# Patient Record
Sex: FEMALE | Race: WHITE | NOT HISPANIC OR LATINO | Employment: OTHER | URBAN - METROPOLITAN AREA
[De-identification: names, ages, dates, MRNs, and addresses within clinical notes are randomized per-mention and may not be internally consistent; named-entity substitution may affect disease eponyms.]

---

## 2017-02-14 ENCOUNTER — TRANSCRIBE ORDERS (OUTPATIENT)
Dept: ADMINISTRATIVE | Age: 82
End: 2017-02-14

## 2017-02-14 ENCOUNTER — APPOINTMENT (OUTPATIENT)
Dept: LAB | Age: 82
End: 2017-02-14
Payer: MEDICARE

## 2017-02-14 DIAGNOSIS — I10 ESSENTIAL (PRIMARY) HYPERTENSION: ICD-10-CM

## 2017-02-14 DIAGNOSIS — D64.9 ANEMIA: ICD-10-CM

## 2017-02-14 DIAGNOSIS — E11.29 TYPE 2 DIABETES MELLITUS WITH OTHER DIABETIC KIDNEY COMPLICATION (HCC): ICD-10-CM

## 2017-02-14 LAB
ALBUMIN SERPL BCP-MCNC: 3.3 G/DL (ref 3.5–5)
ALP SERPL-CCNC: 62 U/L (ref 46–116)
ALT SERPL W P-5'-P-CCNC: 22 U/L (ref 12–78)
ANION GAP SERPL CALCULATED.3IONS-SCNC: 8 MMOL/L (ref 4–13)
AST SERPL W P-5'-P-CCNC: 18 U/L (ref 5–45)
BASOPHILS # BLD AUTO: 0.03 THOUSANDS/ΜL (ref 0–0.1)
BASOPHILS NFR BLD AUTO: 0 % (ref 0–1)
BILIRUB SERPL-MCNC: 0.28 MG/DL (ref 0.2–1)
BUN SERPL-MCNC: 39 MG/DL (ref 5–25)
CALCIUM SERPL-MCNC: 9.3 MG/DL (ref 8.3–10.1)
CHLORIDE SERPL-SCNC: 107 MMOL/L (ref 100–108)
CO2 SERPL-SCNC: 25 MMOL/L (ref 21–32)
CREAT SERPL-MCNC: 1.26 MG/DL (ref 0.6–1.3)
CREAT UR-MCNC: 47.3 MG/DL
EOSINOPHIL # BLD AUTO: 0.13 THOUSAND/ΜL (ref 0–0.61)
EOSINOPHIL NFR BLD AUTO: 2 % (ref 0–6)
ERYTHROCYTE [DISTWIDTH] IN BLOOD BY AUTOMATED COUNT: 14.9 % (ref 11.6–15.1)
EST. AVERAGE GLUCOSE BLD GHB EST-MCNC: 154 MG/DL
GFR SERPL CREATININE-BSD FRML MDRD: 40.4 ML/MIN/1.73SQ M
GLUCOSE SERPL-MCNC: 135 MG/DL (ref 65–140)
HBA1C MFR BLD: 7 % (ref 4.2–6.3)
HCT VFR BLD AUTO: 36.3 % (ref 34.8–46.1)
HGB BLD-MCNC: 11.6 G/DL (ref 11.5–15.4)
LYMPHOCYTES # BLD AUTO: 1.6 THOUSANDS/ΜL (ref 0.6–4.47)
LYMPHOCYTES NFR BLD AUTO: 18 % (ref 14–44)
MCH RBC QN AUTO: 28.2 PG (ref 26.8–34.3)
MCHC RBC AUTO-ENTMCNC: 32 G/DL (ref 31.4–37.4)
MCV RBC AUTO: 88 FL (ref 82–98)
MICROALBUMIN UR-MCNC: 20.6 MG/L (ref 0–20)
MICROALBUMIN/CREAT 24H UR: 44 MG/G CREATININE (ref 0–30)
MONOCYTES # BLD AUTO: 0.54 THOUSAND/ΜL (ref 0.17–1.22)
MONOCYTES NFR BLD AUTO: 6 % (ref 4–12)
NEUTROPHILS # BLD AUTO: 6.61 THOUSANDS/ΜL (ref 1.85–7.62)
NEUTS SEG NFR BLD AUTO: 74 % (ref 43–75)
NRBC BLD AUTO-RTO: 0 /100 WBCS
PLATELET # BLD AUTO: 288 THOUSANDS/UL (ref 149–390)
PMV BLD AUTO: 11.1 FL (ref 8.9–12.7)
POTASSIUM SERPL-SCNC: 4.6 MMOL/L (ref 3.5–5.3)
PROT SERPL-MCNC: 7.4 G/DL (ref 6.4–8.2)
RBC # BLD AUTO: 4.12 MILLION/UL (ref 3.81–5.12)
SODIUM SERPL-SCNC: 140 MMOL/L (ref 136–145)
WBC # BLD AUTO: 8.94 THOUSAND/UL (ref 4.31–10.16)

## 2017-02-14 PROCEDURE — 85025 COMPLETE CBC W/AUTO DIFF WBC: CPT

## 2017-02-14 PROCEDURE — 83036 HEMOGLOBIN GLYCOSYLATED A1C: CPT

## 2017-02-14 PROCEDURE — 82570 ASSAY OF URINE CREATININE: CPT

## 2017-02-14 PROCEDURE — 82043 UR ALBUMIN QUANTITATIVE: CPT

## 2017-02-14 PROCEDURE — 36415 COLL VENOUS BLD VENIPUNCTURE: CPT

## 2017-02-14 PROCEDURE — 80053 COMPREHEN METABOLIC PANEL: CPT

## 2017-02-21 ENCOUNTER — ALLSCRIPTS OFFICE VISIT (OUTPATIENT)
Dept: OTHER | Facility: OTHER | Age: 82
End: 2017-02-21

## 2017-02-21 DIAGNOSIS — E78.5 HYPERLIPIDEMIA: ICD-10-CM

## 2017-02-21 DIAGNOSIS — R94.6 ABNORMAL RESULTS OF THYROID FUNCTION STUDIES: ICD-10-CM

## 2017-02-21 DIAGNOSIS — E11.29 TYPE 2 DIABETES MELLITUS WITH OTHER DIABETIC KIDNEY COMPLICATION (HCC): ICD-10-CM

## 2017-02-21 DIAGNOSIS — C34.90 MALIGNANT NEOPLASM OF UNSPECIFIED PART OF UNSPECIFIED BRONCHUS OR LUNG (HCC): ICD-10-CM

## 2017-03-06 ENCOUNTER — HOSPITAL ENCOUNTER (OUTPATIENT)
Dept: RADIOLOGY | Age: 82
Discharge: HOME | End: 2017-03-06
Payer: MEDICARE

## 2017-03-06 DIAGNOSIS — C34.90 MALIGNANT NEOPLASM OF UNSPECIFIED PART OF UNSPECIFIED BRONCHUS OR LUNG (HCC): ICD-10-CM

## 2017-03-06 PROCEDURE — 71250 CT THORAX DX C-: CPT

## 2017-03-13 ENCOUNTER — ALLSCRIPTS OFFICE VISIT (OUTPATIENT)
Dept: OTHER | Facility: OTHER | Age: 82
End: 2017-03-13

## 2017-07-07 ENCOUNTER — APPOINTMENT (OUTPATIENT)
Dept: LAB | Facility: CLINIC | Age: 82
End: 2017-07-07
Payer: MEDICARE

## 2017-07-07 DIAGNOSIS — E11.29 TYPE 2 DIABETES MELLITUS WITH OTHER DIABETIC KIDNEY COMPLICATION (HCC): ICD-10-CM

## 2017-07-07 DIAGNOSIS — R94.6 ABNORMAL RESULTS OF THYROID FUNCTION STUDIES: ICD-10-CM

## 2017-07-07 DIAGNOSIS — E78.5 HYPERLIPIDEMIA: ICD-10-CM

## 2017-07-07 LAB
CHOLEST SERPL-MCNC: 144 MG/DL (ref 50–200)
CREAT UR-MCNC: 62.4 MG/DL
EST. AVERAGE GLUCOSE BLD GHB EST-MCNC: 177 MG/DL
HBA1C MFR BLD: 7.8 % (ref 4.2–6.3)
HDLC SERPL-MCNC: 41 MG/DL (ref 40–60)
LDLC SERPL CALC-MCNC: 65 MG/DL (ref 0–100)
MICROALBUMIN UR-MCNC: 12.9 MG/L (ref 0–20)
MICROALBUMIN/CREAT 24H UR: 21 MG/G CREATININE (ref 0–30)
T4 FREE SERPL-MCNC: 1.1 NG/DL (ref 0.76–1.46)
TRIGL SERPL-MCNC: 188 MG/DL
TSH SERPL DL<=0.05 MIU/L-ACNC: 0.27 UIU/ML (ref 0.36–3.74)

## 2017-07-07 PROCEDURE — 83036 HEMOGLOBIN GLYCOSYLATED A1C: CPT

## 2017-07-07 PROCEDURE — 82570 ASSAY OF URINE CREATININE: CPT

## 2017-07-07 PROCEDURE — 82043 UR ALBUMIN QUANTITATIVE: CPT

## 2017-07-07 PROCEDURE — 36415 COLL VENOUS BLD VENIPUNCTURE: CPT

## 2017-07-07 PROCEDURE — 80061 LIPID PANEL: CPT

## 2017-07-07 PROCEDURE — 84439 ASSAY OF FREE THYROXINE: CPT

## 2017-07-07 PROCEDURE — 84443 ASSAY THYROID STIM HORMONE: CPT

## 2017-07-14 ENCOUNTER — ALLSCRIPTS OFFICE VISIT (OUTPATIENT)
Dept: OTHER | Facility: OTHER | Age: 82
End: 2017-07-14

## 2017-07-14 DIAGNOSIS — I10 ESSENTIAL (PRIMARY) HYPERTENSION: ICD-10-CM

## 2017-07-14 DIAGNOSIS — R94.6 ABNORMAL RESULTS OF THYROID FUNCTION STUDIES: ICD-10-CM

## 2017-07-14 DIAGNOSIS — E11.65 TYPE 2 DIABETES MELLITUS WITH HYPERGLYCEMIA (HCC): ICD-10-CM

## 2017-11-07 ENCOUNTER — APPOINTMENT (OUTPATIENT)
Dept: LAB | Facility: CLINIC | Age: 82
End: 2017-11-07
Payer: MEDICARE

## 2017-11-07 DIAGNOSIS — I10 ESSENTIAL (PRIMARY) HYPERTENSION: ICD-10-CM

## 2017-11-07 DIAGNOSIS — R94.6 ABNORMAL RESULTS OF THYROID FUNCTION STUDIES: ICD-10-CM

## 2017-11-07 DIAGNOSIS — E11.65 TYPE 2 DIABETES MELLITUS WITH HYPERGLYCEMIA (HCC): ICD-10-CM

## 2017-11-07 LAB
ALBUMIN SERPL BCP-MCNC: 3.7 G/DL (ref 3.5–5)
ALP SERPL-CCNC: 72 U/L (ref 46–116)
ALT SERPL W P-5'-P-CCNC: 21 U/L (ref 12–78)
ANION GAP SERPL CALCULATED.3IONS-SCNC: 8 MMOL/L (ref 4–13)
AST SERPL W P-5'-P-CCNC: 16 U/L (ref 5–45)
BILIRUB SERPL-MCNC: 0.19 MG/DL (ref 0.2–1)
BUN SERPL-MCNC: 36 MG/DL (ref 5–25)
CALCIUM SERPL-MCNC: 9.5 MG/DL (ref 8.3–10.1)
CHLORIDE SERPL-SCNC: 105 MMOL/L (ref 100–108)
CO2 SERPL-SCNC: 28 MMOL/L (ref 21–32)
CREAT SERPL-MCNC: 1.4 MG/DL (ref 0.6–1.3)
CREAT UR-MCNC: 52.8 MG/DL
EST. AVERAGE GLUCOSE BLD GHB EST-MCNC: 160 MG/DL
GFR SERPL CREATININE-BSD FRML MDRD: 34 ML/MIN/1.73SQ M
GLUCOSE P FAST SERPL-MCNC: 87 MG/DL (ref 65–99)
HBA1C MFR BLD: 7.2 % (ref 4.2–6.3)
MICROALBUMIN UR-MCNC: 53.1 MG/L (ref 0–20)
MICROALBUMIN/CREAT 24H UR: 101 MG/G CREATININE (ref 0–30)
POTASSIUM SERPL-SCNC: 4.7 MMOL/L (ref 3.5–5.3)
PROT SERPL-MCNC: 8.1 G/DL (ref 6.4–8.2)
SODIUM SERPL-SCNC: 141 MMOL/L (ref 136–145)
TSH SERPL DL<=0.05 MIU/L-ACNC: 0.38 UIU/ML (ref 0.36–3.74)

## 2017-11-07 PROCEDURE — 83036 HEMOGLOBIN GLYCOSYLATED A1C: CPT

## 2017-11-07 PROCEDURE — 82043 UR ALBUMIN QUANTITATIVE: CPT

## 2017-11-07 PROCEDURE — 82570 ASSAY OF URINE CREATININE: CPT

## 2017-11-07 PROCEDURE — 36415 COLL VENOUS BLD VENIPUNCTURE: CPT

## 2017-11-07 PROCEDURE — 80053 COMPREHEN METABOLIC PANEL: CPT

## 2017-11-07 PROCEDURE — 84443 ASSAY THYROID STIM HORMONE: CPT

## 2017-11-13 ENCOUNTER — ALLSCRIPTS OFFICE VISIT (OUTPATIENT)
Dept: OTHER | Facility: OTHER | Age: 82
End: 2017-11-13

## 2017-11-13 DIAGNOSIS — E11.65 TYPE 2 DIABETES MELLITUS WITH HYPERGLYCEMIA (HCC): ICD-10-CM

## 2017-11-13 DIAGNOSIS — I10 ESSENTIAL (PRIMARY) HYPERTENSION: ICD-10-CM

## 2017-11-13 DIAGNOSIS — E11.29 TYPE 2 DIABETES MELLITUS WITH OTHER DIABETIC KIDNEY COMPLICATION (CODE): ICD-10-CM

## 2017-11-13 DIAGNOSIS — E78.5 HYPERLIPIDEMIA: ICD-10-CM

## 2017-11-14 NOTE — PROGRESS NOTES
Assessment    1  Diabetes mellitus with microalbuminuria (250 40,791 0) (E11 29,R80 9)   2  Diabetes mellitus type II, uncontrolled (250 02) (E11 65)   3  Hypertension (401 9) (I10)   4  Chronic GERD (530 81) (K21 9)   5  Abnormal thyroid function test (794 5) (R94 6)    Plan  Chronic GERD    · Follow-up visit in 4 Months Evaluation and Treatment  Follow-up  Status: Hold For - Scheduling Requested for: 62HVH0721   · Raise the head of your bed to keep stomach acid from coming back up ; Status:Complete;   Done:13Nov2017   · Several things can be done to help treat and prevent your gastric reflux ; Status:Complete;   Done:13Nov2017  Diabetes mellitus type II, uncontrolled, Diabetes mellitus with microalbuminuria    · Basaglar KwikPen 100 UNIT/ML Subcutaneous Solution Pen-injector; inject 34units sc atbedtime   · (1) HEMOGLOBIN A1C; Status:Active; Requested for:13Nov2017;    · (1) MICROALBUMIN CREATININE RATIO, RANDOM URINE; Status:Active; Requested for:13Nov2017;   Hyperlipidemia    · (1) LIPID PANEL FASTING W DIRECT LDL REFLEX; Status:Active; Requested for:13Nov2017;   Hypertension    · (1) COMPREHENSIVE METABOLIC PANEL; Status:Active; Requested for:13Nov2017;   Type 2 diabetes mellitus    · Lantus SoloStar 100 UNIT/ML Subcutaneous Solution Pen-injector    Discussion/Summary  Discussion Summary:   DM2 with microalbuminuria - A1c is improved to 7 2 on lantus 40units daily but has had two episodes of hypoglycemia  She will need to be switched to another basal insulin due to change in formulary  Will rx Basaglar, decrease down to 34units qHS  She is on ACEI, microalbuminuria is stable  essential HTN -stable on lisinopril 10mg daily, amlodipine 5mg daily and HCTZ 12 5mg daily  Monitor BMPChronic GERD -she has been on omeprazole long-term  Will begin taper off, advised start taking every other day for 2 weeks then discontinue  She can take Tums or H2B as needed for breakthrough symptoms  abnormal TSH - TSH has normalized  Will monitor  LAURA - advised to stop taking NSAIDs  Monitor bmpHLD - continue on pravastatin 20mg daily  RLL lung cancer s/p lobectomy- on surveillance by Thoracich/o lymphoma - monitor follow CBC and CMP  She has been released by Onch/o superficial transitional cell bladder cancer - on surveillance by Urology  Medication SE Review and Pt Understands Tx: Possible side effects of new medications were reviewed with the patient/guardian today  The treatment plan was reviewed with the patient/guardian  The patient/guardian understands and agrees with the treatment plan      Chief Complaint  Chief Complaint Chronic Condition [facility]: Patient is here today for follow up of chronic conditions described in HPI  History of Present Illness  HPI: 81yo female with HTN, HLD, RLL lung cancer s/p lobectomy, DM2, remote history of lymphoma here for follow up care  once in a while she takes aleve, for example, [place] and [place] two aleve because of knee pain  She does not take them everyday  Primary Care Diagnostic [facility]: The patient is here today for a follow-up visit  Her diabetes mellitus type 2 is improving  the patient is adherent with her medication regimen  -- She denies medication side effects  (Reports [employer] is no longer covering Lantus under her formulary, requests switch to another insulin  Reports FBS 78 and 98 and hypoglycemic symptoms  Otherwise -170s and -180s)  Her hypertension is primary, but-- stable  the patient is adherent with her medication regimen  -- She denies medication side effects  (She does not monitor her home bp)  She was diagnosed with GERD  The problem stable  the patient is adherent with her medication regimen  -- She denies medication side effects  Medication(s): omeprazole  (She has been on omeprazole several years) (She has been asymptomatic with taking omeprazole daily)     Symptoms: denies chest pain,-- denies dyspnea-- and-- stable lower extremity edema  Lifestyle and Disease Management: Weight Issues: She has weight concerns  Exercise: She does not exercise regularly  Review of Systems  Complete-Female:  Constitutional: weight is stable  Eyes: eyesight problems  Cardiovascular: No complaints of slow heart rate, no fast heart rate, no chest pain, no palpitations, no leg claudication, no lower extremity edema  Respiratory: No complaints of shortness of breath, no wheezing, no cough, no SOB on exertion, no orthopnea, no PND  Gastrointestinal: heartburn  Genitourinary: nocturia, but-- no dysuria-- and-- no incontinence  Musculoskeletal: joint stiffness,-- pain in the upper arm and foot problems  Neurological: numbness,-- tingling-- and-- difficulty walking, but-- no headache-- and-- no dizziness  Psychiatric: anxiety-- and-- sleep disturbances, but-- no depression  Active Problems  1  Abnormal thyroid function test (794 5) (R94 6)   2  Adenocarcinoma of lung, right (162 9) (C34 91)   3  Adenocarcinoma of lung, unspecified laterality (162 9) (C34 90)   4  Anemia (285 9) (D64 9)   5  Diabetes mellitus type II, uncontrolled (250 02) (E11 65)   6  Diabetes mellitus with microalbuminuria (250 40,791 0) (E11 29,R80 9)   7  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)   8  Encounter for screening mammogram for malignant neoplasm of breast (V76 12) (Z12 31)   9  Glaucoma (365 9) (H40 9)   10  Hyperlipidemia (272 4) (E78 5)   11  Hypertension (401 9) (I10)   12  Need for prophylactic vaccination and inoculation against influenza (V04 81) (Z23)   13  Screening for genitourinary condition (V81 6) (Z13 89)   14  Screening for neurological condition (V80 09) (Z13 89)   15  Squamous cell lung cancer (162 9) (C34 90)    Past Medical History    1  History of Acute bronchitis, unspecified organism (466 0) (J20 9)   2  History of Bladder Cancer (V10 51)   3   History of Closed fracture of neck of left humerus, with routine healing, subsequent encounter (V54 11) (S42 212D)   4  History of Diabetes Mellitus (250 00)   5  History of gastroesophageal reflux (GERD) (V12 79) (Z87 19)   6  History of hyperlipidemia (V12 29) (Z86 39)   7  History of hypertension (V12 59) (Z86 79)   8  History of non-Hodgkin's lymphoma (V10 79) (Z85 72)   9  History of Large Cell Lymphoma (200 70)   10  History of Liver Cancer (V10 07)   11  History of Lung Cancer (V10 11)   12  History of Lymphoma (V10 71)   13  History of Osteoporosis (733 00) (M81 0)   14  History of Other closed displaced fracture of proximal end of left humerus, initial encounter (812 09)  (S42 292A)   15  History of Right cataract (366 9) (H26 9)   16  Status post fall (V15 88) (Z91 81)   17  History of Visit for pre-operative examination (V72 84) (T05 118)  Active Problems And Past Medical History Reviewed: The active problems and past medical history were reviewed and updated today  Surgical History  1  History of Cataract Surgery   2  History of Hepatic Lobectomy   3  History of Lung Lobectomy  Surgical History Reviewed: The surgical history was reviewed and updated today  Family History  Mother    1  Family history of Adenocarcinoma Of The Accessory Sinuses (V16 2)   2  Family history of Benign Brain Tumor  Father    3  Family history of Diabetes Mellitus (V18 0)   4  Family history of Leukemia (V16 6)  Sister    5  Family history of Alcoholic (Laennec's) Cirrhosis  Family History    6  Family history of Family Health Status Of Father -    9  Family history of Family Health Status Of Mother -   Family History Reviewed: The family history was reviewed and updated today  Social History     · Denied: History of Drug Use   · Former smoker (V15 82) (Y34 032)   · Marital History -    · No alcohol use   · Occupation: Retired  Social History Reviewed: The social history was reviewed and is unchanged  Current Meds   1   AmLODIPine Besylate 5 MG Oral Tablet; take one tablet once daily; Therapy: 91PHJ8002 to (TGCOTKPM:96AEW8538)  Requested for: 57Eac8031; Last Rx:28Cqr6703 Ordered   2  BD Pen Needle Ultrafine 29G X 12 7MM Miscellaneous; testing twice daily; Therapy: 90JJV1560 to (Evaluate:02Oct2016); Last Rx:19Aln6335 Ordered   3  Biotin 5000 MCG Oral Capsule; Therapy: 15JRI5212 to Recorded   4  Calcium Citrate + D3 250-200 MG-UNIT Oral Tablet; Therapy: 12Apr2013 to Recorded   5  HydroCHLOROthiazide 12 5 MG Oral Tablet; TAKE 1 TABLET DAILY AS NEEDED; Therapy: 30Nqu1357 to (Evaluate:25Nov2017)  Requested for: 93DXC1057; Last Rx:30Nov2016 Ordered   6  Lantus SoloStar 100 UNIT/ML Subcutaneous Solution Pen-injector; inject 40 unit daily; Therapy: 93Tnx5677 to (Evaluate:04Oct2017)  Requested for: 07Apr2017; Last Rx:07Apr2017 Ordered   7  Lisinopril 10 MG Oral Tablet; TAKE 1 TABLET DAILY; Therapy: 08Apr2016 to (DMDGKKTT:95MPL4480)  Requested for: 58Ozi0419; Last Rx:01San4002 Ordered   8  Multivitamins TABS; TAKE 1 TABLET DAILY; Therapy: 12Apr2013 to (Evaluate:12May2013) Recorded   9  Pravastatin Sodium 20 MG Oral Tablet; TAKE 1 TABLET DAILY; Therapy: 12Apr2013 to (Tara Acuna)  Requested for: 60DBA1870; Last Rx:10Oct2017 Ordered   10  PriLOSEC OTC 20 MG Oral Tablet Delayed Release; take 1 tablet every other day; Therapy: 12Apr2013 to (Evaluate:28Mar2016) Recorded   11  Vitamin B-12 100 MCG Oral Tablet; Therapy: 12Apr2013 to Recorded  Medication List Reviewed: The medication list was reviewed and updated today  Allergies  1  No Known Drug Allergies  2  No Known Environmental Allergies   3  No Known Food Allergies    Vitals  Vital Signs    Recorded: 94IVH0974 10:30AM   Temperature 98 3 F   Heart Rate 90   Systolic 798   Diastolic 72   Height 5 ft 4 in   Weight 172 lb    BMI Calculated 29 52   BSA Calculated 1 83   O2 Saturation 98       Physical Exam   Constitutional  General appearance: No acute distress, well appearing and well nourished  obese    Ears, Nose, Mouth, and Throat  Oropharynx: Normal with no erythema, edema, exudate or lesions  -- wears dentures, MMM  Pulmonary  Respiratory effort: No increased work of breathing or signs of respiratory distress  Auscultation of lungs: Clear to auscultation  Cardiovascular  Auscultation of heart: Normal rate and rhythm, normal S1 and S2, without murmurs  Examination of extremities for edema and/or varicosities: Abnormal   left ankle trace+ pitting edema  varicosities noted bilaterally  Abdomen  Abdomen: Non-tender, no masses  The abdomen was obese  Bowel sounds were normal  The abdomen was soft and nontender  The abdomen was normal to percussion  Lymphatic  Palpation of lymph nodes in neck: No lymphadenopathy  Neurologic Grossly intact  Psychiatric  Orientation to person, place, and time: Normal    Mood and affect: Normal          Results/Data   07 Nov 2017 11:36 AM   (1) TSH WITH FT4 REFLEX       TSH 0 379  07 Nov 2017 11:36 AM   (1) HEMOGLOBIN A1C       HEMOGLOBIN A1C 7 2  07 Nov 2017 11:36 AM   (1) MICROALBUMIN CREATININE RATIO, RANDOM URINE       MICROALBUMIN/ CREAT R 101       MICROALBUMIN,URINE 53 1       CREATININE URINE 52 8       EST  AVG  GLUCOSE 160  07 Nov 2017 11:36 AM   (1) COMPREHENSIVE METABOLIC PANEL       SODIUM 141       POTASSIUM 4 7       CHLORIDE 105       CARBON DIOXIDE 28       ANION GAP (CALC) 8       BLOOD UREA NITROGEN 36       CREATININE 1 40       CALCIUM 9 5       BILI, TOTAL 0 19       ALK PHOSPHATAS 72       ALT (SGPT) 21       AST(SGOT) 16       ALBUMIN 3 7       TOTAL PROTEIN 8 1       eGFR 34       GLUCOSE FASTING 87  Future Appointments    Date/Time Provider Specialty Site   03/12/2018 10:00 AM MOISÉS Eaton   Thoracic Surgery [facility]       Signatures   Electronically signed by : Jay Croft DO; Nov 13 2017 11:13AM EST                       (Author)

## 2018-01-11 NOTE — MISCELLANEOUS
Assessment    1  Closed fracture of neck of left humerus, with routine healing, subsequent encounter   (V54 11) (S42 212D)   2  Hypertension (401 9) (I10)   3  Anemia (285 9) (D64 9)   4  Status post fall (V15 88) (Z91 81)    Plan  Anemia    · (1) CBC/ PLT (NO DIFF); Status:Active; Requested for:44Xyr5928;    Perform:[facility]; HDB:09KMP4347;OXKHURY; [facility]; Ordered By:Nurys Brambila;   · (1) CBC/ PLT (NO DIFF); Status:Active; Requested for:2016;    Perform:[facility]; PNR:51ZOQ9788;XEDEJHY; [facility]; Ordered By:Nurys Brambila;   · (1) FERRITIN; Status:Active; Requested for:66Ldw0867;    Perform:[facility]; LY14ZVZ1554;NMVBSNW; [facility]; Ordered By:Nurys Brambila;   · (1) IRON; Status:Active; Requested for:45Syd5860;    Perform:[facility]; FHF:48LXM8796;PFRFPEM; [facility]; Ordered By:Nurys Brambila;   · (1) TIBC; Status:Active; Requested for:29Qdn3094;    Perform:[facility]; PKV:62XZN4175;EWMRZQV; [facility]; Ordered By:Nurys Brambila;  Diabetes mellitus with microalbuminuria    · (1) HEMOGLOBIN A1C; Status:Active; Requested for:2016;    Perform:[facility]; LGJ:83GRK6005;UCNZFYL; For:Diabetes mellitus with microalbuminuria; Ordered By:Nurys Brambila;   · (1) MICROALBUMIN CREATININE RATIO, RANDOM URINE; Status:Active; Requested  for:2016;    Perform:[facility]; DQE:26QXF7769;ZSHBEKZ; For:Diabetes mellitus with microalbuminuria; Ordered By:Nurys Brambila; Hyperlipidemia    · (1) LIPID PANEL FASTING W DIRECT LDL REFLEX; Status:Active; Requested  for:00Xer4070;    Perform:[facility]; AOX:20RVT3147;OJWJUXH;  Jenni Lucas; Ordered By:Nurys Brambila; Hypertension    · Hydrochlorothiazide 12 5 MG Oral Tablet; TAKE 1 TABLET DAILY AS NEEDED   Rx By: Michael Sexton; Dispense: 30 Days ; #:30 Tablet; Refill: 0; For: Hypertension; VICTOR M = N; Verified Transmission to [facility]/PHARMACY #[id number]  Last Updated By: System, Tavia; 8/11/2016 2:17:44 PM   · (1) BASIC METABOLIC PROFILE; Status:Active; Requested for:11Aug2016;    Perform:[facility]; RJK:55BQN1733;MKIMQDQ;  For:Hypertension; Ordered By:Nurys Brambila;   · (1) COMPREHENSIVE METABOLIC PANEL; Status:Active; Requested for:11Ylz2933;    Perform:[facility]; HQU:14YWE0869;EPMQSSA;  For:Hypertension; Ordered By:Belgica Brambila;   · Follow-up visit in 3 months Evaluation and Treatment  Follow-up  Status: Hold For -  Scheduling  Requested for: 11Aug2016   Ordered; For: Hypertension; Ordered By: Jae Sahni Performed:  Due: 99XTT1608    Discussion/Summary  Discussion Summary:     1  left humerus fracture s/p mechanical fall - patient will remain NWB and in LUE sling until evaluated by Ortho  Pain is controlled  2  essential HTN - due to BLE edema, will restart on HCTZ 12 5mg daily, decrease dose of amlodipine 5mg bid to once daily  Obtain BMP in 3-5 days  Call with bp log in 1 week  Will keep off lisinopril 10mg daily for now  3  anemia - check iron panel, repeat CBC  4  DM2 with microalbuminuria - A1c improved to 7 4  WIll continue on lantus 35units in AM  ACEI was discontinued during her hospitalization  History of Present Illness  TCM Communication [facility]: The patient is being contacted for follow-up after hospitalization and [facility]  She was hospitalized at and [facility]  The date of admission: 7/22/2016, date of discharge: 8/3/2016  She was discharged to home  She scheduled a follow up appointment  The patient is currently asymptomatic  Counseling was provided to the patient  Communication performed and completed by Gwen Ennis   HPI: 79yo female with h/o HTN, HLD, RLL lung cancer s/p lobectomy, DM2, remote history of lymphoma here for transition of care  She was hospitalized at [facility] 7/17-7/22/16 following a mechanical fall  She suffered a closed 2-part nondisplaced fracture of surgical neck of left humerus   No surgical intervention was indicated, her left arm was placed in a sling  She was transferred to [facility] for rehab  She was discharged on 8/4/16  She remains nonweightbearing on her LUE  She has an upcoming appointment with Ortho 8/19/16  She has minimal pain, if she turns a certain way it will  She has not needed to take anything for pain  She is right handed  She has nurses' aides in AM and PM to help with dressing  She has not started PT yet as she is still in a sling  Her bp meds were adjusted, she was discontinued off triamterene/hctz and lisinopril 10mg daily which was restarted at her last visit  She has noticed BLE edema since she was in rehab  She is noted to have anemia, hb of 9  Denies melena, hematemesis or abdominal pain  She is not SOB, no dizziness  Review of Systems  Complete-Female:   Constitutional: recent weight loss  Cardiovascular: No complaints of slow heart rate, no fast heart rate, no chest pain, no palpitations, no leg claudication, no lower extremity edema  Respiratory: No complaints of shortness of breath, no wheezing, no cough, no SOB on exertion, no orthopnea, no PND  Gastrointestinal: No complaints of abdominal pain, no constipation, no nausea or vomiting, no diarrhea, no bloody stools  Musculoskeletal: as noted in HPI  Neurological: difficulty walking, but no headache, no numbness and no dizziness  Active Problems    1  Abnormal thyroid function test (794 5) (R94 6)   2  Adenocarcinoma of lung, unspecified laterality (162 9) (C34 90)   3  Diabetes mellitus with HbA1C goal between 7 and 8 (250 00) (E11 9)   4  Diabetes mellitus with microalbuminuria (250 40,791 0) (E11 29,R80 9)   5  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)   6  Encounter for screening mammogram for malignant neoplasm of breast (V76 12)   (Z12 31)   7  Glaucoma (365 9) (H40 9)   8  Glaucoma screening (V80 1) (Z13 5)   9  Hyperkalemia (276 7) (E87 5)   10   Hyperlipidemia (272 4) (E78 5) 11  Hypertension (401 9) (I10)   12  Immunization, pneumococcus and influenza (V06 6) (Z23)   13  Need for prophylactic vaccination and inoculation against influenza (V04 81) (Z23)   14  Other closed displaced fracture of proximal end of left humerus, initial encounter    (812 09) (S42 292A)   15  Right cataract (366 9) (H26 9)   16  Screening for depression (V79 0) (Z13 89)   17  Screening for genitourinary condition (V81 6) (Z13 89)   18  Screening for neurological condition (V80 09) (Z13 89)   19  Squamous cell lung cancer (162 9) (C34 90)   20  Type 2 diabetes mellitus (250 00) (E11 9)    Past Medical History    1  History of Bladder Cancer (V10 51)   2  History of Diabetes Mellitus (250 00)   3  History of gastroesophageal reflux (GERD) (V12 79) (Z87 19)   4  History of hyperlipidemia (V12 29) (Z86 39)   5  History of hypertension (V12 59) (Z86 79)   6  History of non-Hodgkin's lymphoma (V10 79) (Z85 72)   7  History of Large Cell Lymphoma (200 70)   8  History of Liver Cancer (V10 07)   9  History of Lung Cancer (V10 11)   10  History of Lymphoma (V10 71)   11  History of Osteoporosis (733 00) (M81 0)   12  History of Visit for pre-operative examination (V72 84) (I37 564)    Surgical History    1  History of Cataract Surgery   2  History of Hepatic Lobectomy   3  History of Lung Lobectomy  Surgical History Reviewed: The surgical history was reviewed and updated today  Family History  Mother    1  Family history of Adenocarcinoma Of The Accessory Sinuses (V16 2)   2  Family history of Benign Brain Tumor  Father    3  Family history of Diabetes Mellitus (V18 0)   4  Family history of Leukemia (V16 6)  Sister    5  Family history of Alcoholic (Laennec's) Cirrhosis  Family History    6  Family history of Family Health Status Of Father -    9  Family history of Family Health Status Of Mother -   Family History Reviewed: The family history was reviewed and updated today         Social History    · Alcohol Use (History)   · Denied: History of Drug Use   · Former smoker (E25 77) (T61 066)   · Marital History -    · Occupation: Retired  Social History Reviewed: The social history was reviewed and is unchanged  Current Meds   1  AmLODIPine Besylate 5 MG Oral Tablet; TAKE 1 TABLET TWICE DAILY; Therapy: 57QQG1980 to (Yina Whaley)  Requested for: 12Oct2015; Last   Rx:46Hhx0477 Ordered   2  BD Pen Needle Ultrafine 29G X 12 7MM Miscellaneous; testing twice daily; Therapy: 37UME6911 to (Evaluate:05Orf5259); Last Rx:27Bln3274 Ordered   3  Calcium Citrate + D3 250-200 MG-UNIT Oral Tablet; Therapy: 86Wiy8879 to Recorded   4  Lantus SoloStar 100 UNIT/ML Subcutaneous Solution Pen-injector; Use 35 units in the   morning; Therapy: 61Icc2763 to (Evaluate:37Klg7439)  Requested for: 58Vfu2629 Recorded   5  Multivitamins TABS; TAKE 1 TABLET DAILY; Therapy: 78Plc7830 to (Evaluate:88Lvv7211) Recorded   6  Pravastatin Sodium 20 MG Oral Tablet; TAKE 1 TABLET DAILY; Therapy: 05Ofh3953 to (Evaluate:90Hhb0844)  Requested for: 84Idm1142; Last   Rx:62Owd7300 Ordered   7  PriLOSEC OTC 20 MG Oral Tablet Delayed Release; take 1 tablet every other day; Therapy: 09Qpe0770 to (Evaluate:28Mar2016) Recorded   8  Vitamin B-12 100 MCG Oral Tablet; Therapy: 57Tno9809 to Recorded  Medication List Reviewed: The medication list was reviewed and updated today  Allergies    1  No Known Drug Allergies    2  No Known Environmental Allergies   3  No Known Food Allergies    Vitals  Signs   Recorded: 26KKN3745 27:44FB   Systolic: 165  Diastolic: 66  Heart Rate: 101  Respiration: 17  Temperature: 99 2 F  O2 Saturation: 95  Height: 5 ft 4 in  Weight: 165 lb 2 08 oz  BMI Calculated: 28 34  BSA Calculated: 1 81    Physical Exam    Constitutional   General appearance: No acute distress, well appearing and well nourished  obese     Ears, Nose, Mouth, and Throat   Oropharynx: Normal with no erythema, edema, exudate or lesions  wears dentures, MMM  Pulmonary   Respiratory effort: No increased work of breathing or signs of respiratory distress  Auscultation of lungs: Clear to auscultation  Cardiovascular   Auscultation of heart: Normal rate and rhythm, normal S1 and S2, without murmurs  Examination of extremities for edema and/or varicosities: Abnormal   bilateral pretibial 1+ pitting edema  radial and post tib 2/4 bilaterally  Lymphatic   Palpation of lymph nodes in neck: No lymphadenopathy  Musculoskeletal   Inspection/palpation of joints, bones, and muscles: Abnormal   LUE in arm sling   strength equal    Neurologic Grossly intact  Psychiatric   Orientation to person, place, and time: Normal     Mood and affect: Normal          Results/Data    7/24/16  BUN/creatinine 38/1 09  h/h 10 1/31 0-->9 2/28 0-->9 0  WBC 17-->11 4  A1c 7 4        Future Appointments    Date/Time Provider Specialty Site   03/14/2017 08:30 AM Chad Ernst MD Thoracic Surgery CT SURGERY [place] OFFICE   08/19/2016 11:00 AM MOISÉS Mcgrath   Orthopedic Surgery [facility]     Signatures   Electronically signed by : Jannie Yousif OM; Aug  3 2016  2:02PM EST                       (Author)    Electronically signed by : Izabela Galeas DO; Aug 11 2016  2:33PM EST                       (Author)

## 2018-01-12 NOTE — PROGRESS NOTES
Assessment    1  Diabetes mellitus with microalbuminuria (250 40,791 0) (E11 29,R80 9)   2  Hypertension (401 9) (I10)    Plan  Abnormal thyroid function test    · (1) TSH WITH FT4 REFLEX; Status:Active; Requested for:21Feb2017;   Diabetes mellitus with microalbuminuria    · (1) HEMOGLOBIN A1C; Status:Active; Requested for:21Feb2017;    · (1) MICROALBUMIN CREATININE RATIO, RANDOM URINE; Status:Active; Requested for:21Feb2017;   Hyperlipidemia    · (1) LIPID PANEL FASTING W DIRECT LDL REFLEX; Status:Active; Requested for:21Feb2017;   Hypertension    · Follow-up visit in 3 months Evaluation and Treatment  Follow-up  Status: Hold For - Scheduling   Requested for: 21Feb2017    Discussion/Summary  Discussion Summary:     1  DM2 with microalbuminuria - A1c improved at 7 0 on increased dose of lantus 40units daily  She is continue to monitor for hypoglycemia  Lisinopril restarted with decreased microalbuminuria  2  essential HTN - bp improved with restarting lisinopril 10mg daily  Continue amlodipine 5mg daily and HCTZ 12 5mg daily  3  leukocytosis - resolved on repeat CBC  Chief Complaint  Chief Complaint Chronic Condition [facility]: Patient is here today for follow up of chronic conditions described in HPI  History of Present Illness  HPI: 81yo female with h/o HTN, HLD, RLL lung cancer s/p lobectomy, DM2, remote history of lymphoma here for follow up of diabetes and high blood pressure  She went to the [facility] for 10days and did not follow a diet while on vacation       Primary Care Diagnostic [facility]: The patient is here today for a follow-up visit  Her diabetes mellitus type 2 is improving  Management changes made at the last visit include changing the dose of increased lantus to 40units  the patient is adherent with her medication regimen  She denies medication side effects  (-170s, -200s, denies hypoglycemia)  Her hypertension is primary, but stable and improving   the patient is adherent with her medication regimen  She denies medication side effects  Medication(s): a thiazide diuretic, an ACE inhibitor and a calcium channel blocker  (She does not monitor her home bp)  Symptoms: denies chest pain, denies dyspnea and stable lower extremity edema  Associated symptoms include recent weight gain  Lifestyle and Disease Management: Weight Issues: She has weight concerns  Exercise: She does not exercise regularly  Review of Systems  Complete-Female:   Constitutional: recent weight gain and feeling tired  Eyes: eyesight problems  Cardiovascular: lower extremity edema, but no chest pain and no palpitations  Respiratory: No complaints of shortness of breath, no wheezing, no cough, no SOB on exertion, no orthopnea, no PND  Gastrointestinal: heartburn  Genitourinary: nocturia, but no dysuria and no incontinence  Musculoskeletal: joint stiffness, pain in the upper arm and foot problems  Neurological: numbness, tingling and difficulty walking, but no headache and no dizziness  Psychiatric: anxiety and sleep disturbances, but no depression  Active Problems    1  Abnormal thyroid function test (794 5) (R94 6)   2  Adenocarcinoma of lung, unspecified laterality (162 9) (C34 90)   3  Anemia (285 9) (D64 9)   4  Closed fracture of neck of left humerus, with routine healing, subsequent encounter (V54 11)   (S42 212D)   5  Diabetes mellitus with HbA1C goal between 7 and 8 (250 00) (E11 9)   6  Diabetes mellitus with microalbuminuria (250 40,791 0) (E11 29,R80 9)   7  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)   8  Encounter for screening mammogram for malignant neoplasm of breast (V76 12) (Z12 31)   9  Glaucoma (365 9) (H40 9)   10  Glaucoma screening (V80 1) (Z13 5)   11  Hyperkalemia (276 7) (E87 5)   12  Hyperlipidemia (272 4) (E78 5)   13  Hypertension (401 9) (I10)   14  Immunization, pneumococcus and influenza (V06 6) (Z23)   15   Need for prophylactic vaccination and inoculation against influenza (V04 81) (Z23)   16  Other closed displaced fracture of proximal end of left humerus, initial encounter (812 09)    (S42 292A)   17  Right cataract (366 9) (H26 9)   18  Screening for depression (V79 0) (Z13 89)   19  Screening for genitourinary condition (V81 6) (Z13 89)   20  Screening for neurological condition (V80 09) (Z13 89)   21  Squamous cell lung cancer (162 9) (C34 90)   22  Status post fall (V15 88) (Z91 81)   23  Type 2 diabetes mellitus (250 00) (E11 9)    Past Medical History    1  History of Acute bronchitis, unspecified organism (466 0) (J20 9)   2  History of Bladder Cancer (V10 51)   3  History of Diabetes Mellitus (250 00)   4  History of gastroesophageal reflux (GERD) (V12 79) (Z87 19)   5  History of hyperlipidemia (V12 29) (Z86 39)   6  History of hypertension (V12 59) (Z86 79)   7  History of non-Hodgkin's lymphoma (V10 79) (Z85 72)   8  History of Large Cell Lymphoma (200 70)   9  History of Liver Cancer (V10 07)   10  History of Lung Cancer (V10 11)   11  History of Lymphoma (V10 71)   12  History of Osteoporosis (733 00) (M81 0)   13  History of Visit for pre-operative examination (V72 84) (N13 087)  Active Problems And Past Medical History Reviewed: The active problems and past medical history were reviewed and updated today  Surgical History    1  History of Cataract Surgery   2  History of Hepatic Lobectomy   3  History of Lung Lobectomy  Surgical History Reviewed: The surgical history was reviewed and updated today  Family History  Mother    1  Family history of Adenocarcinoma Of The Accessory Sinuses (V16 2)   2  Family history of Benign Brain Tumor  Father    3  Family history of Diabetes Mellitus (V18 0)   4  Family history of Leukemia (V16 6)  Sister    5  Family history of Alcoholic (Laennec's) Cirrhosis  Family History    6  Family history of Family Health Status Of Father -    9   Family history of Family Health Status Of Mother -   Family History Reviewed: The family history was reviewed and updated today  Social History    · Alcohol Use (History)   · Denied: History of Drug Use   · Former smoker (V15 82) (Y64 889)   · Marital History -    · Occupation: Retired  Social History Reviewed: The social history was reviewed and is unchanged  Current Meds   1  AmLODIPine Besylate 5 MG Oral Tablet; take one tablet once daily; Therapy: 23UNQ8044 to (Evaluate:81Sfy1356)  Requested for: 91OQL7105; Last Rx:2017   Ordered   2  BD Pen Needle Ultrafine 29G X 12 7MM Miscellaneous; testing twice daily; Therapy: 91WEW6854 to (Evaluate:2016); Last Rx:03Bqk9180 Ordered   3  Calcium Citrate + D3 250-200 MG-UNIT Oral Tablet; Therapy: 2013 to Recorded   4  HydroCHLOROthiazide 12 5 MG Oral Tablet; TAKE 1 TABLET DAILY AS NEEDED; Therapy: 2016 to (Evaluate:2017)  Requested for: 23HSR6713; Last Rx:2016   Ordered   5  Lantus SoloStar 100 UNIT/ML Subcutaneous Solution Pen-injector; Use 40 units in the morning; Therapy: 53Ftt7399 to (Evaluate:2017)  Requested for: 18Zoa7540 Recorded   6  Lisinopril 10 MG Oral Tablet; TAKE 1 TABLET DAILY; Therapy: 2016 to (Evaluate:45Yqt5605)  Requested for: 36QXH1529; Last Rx:2017   Ordered   7  Multivitamins TABS; TAKE 1 TABLET DAILY; Therapy: 2013 to (Evaluate:77Uba8063) Recorded   8  Pravastatin Sodium 20 MG Oral Tablet; TAKE 1 TABLET DAILY; Therapy: 2013 to (Evaluate:2017)  Requested for: 58PEQ9976; Last Rx:98Oja9750   Ordered   9  PriLOSEC OTC 20 MG Oral Tablet Delayed Release; take 1 tablet every other day; Therapy: 2013 to (Evaluate:2016) Recorded   10  Vitamin B-12 100 MCG Oral Tablet; Therapy: 2013 to Recorded  Medication List Reviewed: The medication list was reviewed and updated today  Allergies    1  No Known Drug Allergies    2  No Known Environmental Allergies   3   No Known Food Allergies    Vitals  Vital Signs    Recorded: 43Ing7243 10:52AM   Temperature 98 F   Heart Rate 98   Respiration 16   Systolic 465   Diastolic 80   Height 5 ft 4 2 in   Weight 170 lb 2 08 oz   BMI Calculated 29 02   BSA Calculated 1 84   O2 Saturation 95     Physical Exam    Constitutional   General appearance: No acute distress, well appearing and well nourished  obese  Ears, Nose, Mouth, and Throat   Oropharynx: Normal with no erythema, edema, exudate or lesions  wears dentures, MMM  Pulmonary   Respiratory effort: No increased work of breathing or signs of respiratory distress  Auscultation of lungs: Clear to auscultation  Cardiovascular   Auscultation of heart: Normal rate and rhythm, normal S1 and S2, without murmurs  Examination of extremities for edema and/or varicosities: Abnormal   bilateral ankle trace+ pitting edema  wears compression hose  Neurologic No focal deficit  Psychiatric   Orientation to person, place, and time: Normal     Mood and affect: Normal          Results/Data  (1) HEMOGLOBIN A1C 33WCQ8942 08:54AM [facility] Number: [id number]     Test Name Result Flag Reference   HEMOGLOBIN A1C 7 0 % H 4 2-6 3   EST  AVG  GLUCOSE 154 mg/dl       (1) MICROALBUMIN CREATININE RATIO, RANDOM URINE 88BCW5950 08:54AM [employer]    Order Number: [id number]     Test Name Result Flag Reference   MICROALBUMIN/ CREAT R 44 mg/g creatinine H 0-30   MICROALBUMIN,URINE 20 6 mg/L H 0 0-20 0   CREATININE URINE 47 3 mg/dL       (1) COMPREHENSIVE METABOLIC PANEL 17KCB2346 34:38OW Gerhardt Cannon Order Number: [id number]     Test Name Result Flag Reference   GLUCOSE,RANDM 135 mg/dL     If the patient is fasting, the ADA then defines impaired fasting glucose as > 100 mg/dL and diabetes as > or equal to 123 mg/dL     SODIUM 140 mmol/L  136-145   POTASSIUM 4 6 mmol/L  3 5-5 3   CHLORIDE 107 mmol/L  100-108   CARBON DIOXIDE 25 mmol/L  21-32   ANION GAP (CALC) 8 mmol/L  4-13   BLOOD UREA NITROGEN 39 mg/dL H 5-25   CREATININE 1 26 mg/dL  0 60-1 30   Standardized to IDMS reference method   CALCIUM 9 3 mg/dL  8 3-10 1   BILI, TOTAL 0 28 mg/dL  0 20-1 00   ALK PHOSPHATAS 62 U/L     ALT (SGPT) 22 U/L  12-78   AST(SGOT) 18 U/L  5-45   ALBUMIN 3 3 g/dL L 3 5-5 0   TOTAL PROTEIN 7 4 g/dL  6 4-8 2   eGFR Non-African American 40 4 ml/min/1 73sq m     [email or web address] [facility] Disease Education Program recommendations are as follows:  GFR calculation is accurate only with a steady state creatinine  Chronic Kidney disease less than 60 ml/min/1 73 sq  meters  Kidney failure less than 15 ml/min/1 73 sq  meters  (1) CBC/PLT/DIFF 73OUX4786 08:54AM Richard Mcmahan Order Number: [id number]     Test Name Result Flag Reference   WBC COUNT 8 94 Thousand/uL  4 31-10 16   RBC COUNT 4 12 Million/uL  3 81-5 12   HEMOGLOBIN 11 6 g/dL  11 5-15 4   HEMATOCRIT 36 3 %  34 8-46  1   MCV 88 fL  82-98   MCH 28 2 pg  26 8-34 3   MCHC 32 0 g/dL  31 4-37 4   RDW 14 9 %  11 6-15 1   MPV 11 1 fL  8 9-12 7   PLATELET COUNT 850 Thousands/uL  149-390   nRBC AUTOMATED 0 /100 WBCs     NEUTROPHILS RELATIVE PERCENT 74 %  43-75   LYMPHOCYTES RELATIVE PERCENT 18 %  14-44   MONOCYTES RELATIVE PERCENT 6 %  4-12   EOSINOPHILS RELATIVE PERCENT 2 %  0-6   BASOPHILS RELATIVE PERCENT 0 %  0-1   NEUTROPHILS ABSOLUTE COUNT 6 61 Thousands/?L  1 85-7 62   LYMPHOCYTES ABSOLUTE COUNT 1 60 Thousands/?L  0 60-4 47   MONOCYTES ABSOLUTE COUNT 0 54 Thousand/?L  0 17-1 22   EOSINOPHILS ABSOLUTE COUNT 0 13 Thousand/?L  0 00-0 61   BASOPHILS ABSOLUTE COUNT 0 03 Thousands/?L  0 00-0 10   - Patient Instructions: This bloodwork is non-fasting  Please drink two glasses of water morning of bloodwork  - Patient Instructions: This bloodwork is non-fasting  Please drink two glasses of water morning of bloodwork       Future Appointments    Date/Time Provider Specialty Site   03/13/2017 10:45 AM Yasmeen Chery MD Thoracic Surgery CT SURGERY [facility] OFFICE   06/08/2017 10:30 AM Ana Baird DO Internal Medicine [facility]     Signatures   Electronically signed by : Afsaneh Segundo DO; Feb 21 2017 12:00PM EST                       (Author)

## 2018-01-12 NOTE — MISCELLANEOUS
History of Present Illness  TCM Communication [facility]: The patient is being contacted for follow-up after hospitalization and [facility]  She was hospitalized at and [facility]  The date of admission: 7/17/2016, date of discharge: 7/22/2016  Diagnosis: Mechanical Fall with left proximal humerus fracture  She was discharged to a nursing home  She did not schedule a follow up appointment  Counseling was provided to patient's caretaker  Communication performed and completed by Gildardo Schmitz      Active Problems    1  Abnormal thyroid function test (794 5) (R94 6)   2  Adenocarcinoma of lung, unspecified laterality (162 9) (C34 90)   3  Diabetes mellitus with HbA1C goal between 7 and 8 (250 00) (E11 9)   4  Diabetes mellitus with microalbuminuria (250 40,791 0) (E11 29,R80 9)   5  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)   6  Encounter for screening mammogram for malignant neoplasm of breast (V76 12)   (Z12 31)   7  Glaucoma (365 9) (H40 9)   8  Glaucoma screening (V80 1) (Z13 5)   9  Hyperkalemia (276 7) (E87 5)   10  Hyperlipidemia (272 4) (E78 5)   11  Hypertension (401 9) (I10)   12  Immunization, pneumococcus and influenza (V06 6) (Z23)   13  Need for prophylactic vaccination and inoculation against influenza (V04 81) (Z23)   14  Right cataract (366 9) (H26 9)   15  Screening for depression (V79 0) (Z13 89)   16  Screening for genitourinary condition (V81 6) (Z13 89)   17  Screening for neurological condition (V80 09) (Z13 89)   18  Squamous cell lung cancer (162 9) (C34 90)   19  Type 2 diabetes mellitus (250 00) (E11 9)    Past Medical History    1  History of Bladder Cancer (V10 51)   2  History of Diabetes Mellitus (250 00)   3  History of gastroesophageal reflux (GERD) (V12 79) (Z87 19)   4  History of hyperlipidemia (V12 29) (Z86 39)   5  History of hypertension (V12 59) (Z86 79)   6  History of non-Hodgkin's lymphoma (V10 79) (Z85 72)   7   History of Large Cell Lymphoma (200 70)   8  History of Liver Cancer (V10 07)   9  History of Lung Cancer (V10 11)   10  History of Lymphoma (V10 71)   11  History of Osteoporosis (733 00) (M81 0)   12  History of Visit for pre-operative examination (V7 84) (W66 084)    Surgical History    1  History of Cataract Surgery   2  History of Hepatic Lobectomy   3  History of Lung Lobectomy    Family History  Mother    1  Family history of Adenocarcinoma Of The Accessory Sinuses (V16 2)   2  Family history of Benign Brain Tumor  Father    3  Family history of Diabetes Mellitus (V18 0)   4  Family history of Leukemia (V16 6)  Sister    5  Family history of Alcoholic (Laennec's) Cirrhosis  Family History    6  Family history of Family Health Status Of Father -    9  Family history of Family Health Status Of Mother -     Social History    · Alcohol Use (History)   · Denied: History of Drug Use   · Former smoker (V15 82) (F81 182)   · Marital History -    · Occupation: Retired    Current Meds   1  AmLODIPine Besylate 5 MG Oral Tablet; TAKE 1 TABLET TWICE DAILY; Therapy: 76GAW0806 to (Aby Abreu)  Requested for: 2015; Last   Rx:60Zst4206 Ordered   2  BD Pen Needle Ultrafine 29G X 12 7MM Miscellaneous; testing twice daily; Therapy: 45SFD4840 to (Evaluate:42Igf5675); Last Rx:41Xno9652 Ordered   3  Calcium Citrate + D3 250-200 MG-UNIT Oral Tablet; Therapy: 2013 to Recorded   4  Lantus SoloStar 100 UNIT/ML Subcutaneous Solution Pen-injector; Use 35 units in the   morning; Therapy: 50Sej3717-(Evaluate:61Isj4605)  Requested for: 53XBW4194 Recorded   5  Lisinopril 10 MG Oral Tablet; TAKE 1 TABLET DAILY; Therapy: 2016 to (Evaluate:55Tzx0504)  Requested for: 97Ckn6194; Last   Rx:40Jes8478 Ordered   6  Multivitamins TABS; TAKE 1 TABLET DAILY; Therapy: 2013 to (Evaluate:53Lyt8265) Recorded   7  Pravastatin Sodium 20 MG Oral Tablet; TAKE 1 TABLET DAILY;    Therapy: 2013 to (Evaluate:32Cwu6777) Requested for: 03Apr2016; Last   Rx:03Apr2016 Ordered   8  PriLOSEC OTC 20 MG Oral Tablet Delayed Release; take 1 tablet every other day; Therapy: 12Apr2013 to (Evaluate:28Mar2016) Recorded   9  Triamterene-HCTZ 37 5-25 MG Oral Capsule; take 1 capsule daily; Therapy: 13Apr2010 to (Evaluate:22Oct2016)  Requested for: 25Apr2016; Last   Rx:25Apr2016 Ordered   10  Vitamin B-12 100 MCG Oral Tablet; Therapy: 12Apr2013 to Recorded    Allergies    1  No Known Drug Allergies    2  No Known Environmental Allergies   3  No Known Food Allergies    Future Appointments    Date/Time Provider Specialty Site   03/14/2017 08:30 AM Desi Tinoco MD Thoracic Surgery CT SURGERY [facility] OFFICE   08/01/2016 09:30 AM Estephania Amador DO Internal Medicine [facility] INTERNAL MED   08/01/2016 09:00 AM MOISÉS Benavidez   Orthopedic Surgery [facility] SPECIALISTS SPORTS     Signatures   Electronically signed by : Nicole Gordon OM; Jul 22 2016  3:45PM EST                       (Author)    Electronically signed by : Blayne Lowe DO; Jul 26 2016 10:58AM EST

## 2018-01-13 VITALS
HEIGHT: 64 IN | RESPIRATION RATE: 16 BRPM | TEMPERATURE: 98.4 F | BODY MASS INDEX: 28.92 KG/M2 | SYSTOLIC BLOOD PRESSURE: 132 MMHG | WEIGHT: 169.38 LBS | DIASTOLIC BLOOD PRESSURE: 72 MMHG | HEART RATE: 85 BPM | OXYGEN SATURATION: 95 %

## 2018-01-13 VITALS
OXYGEN SATURATION: 98 % | DIASTOLIC BLOOD PRESSURE: 72 MMHG | HEART RATE: 90 BPM | HEIGHT: 64 IN | SYSTOLIC BLOOD PRESSURE: 142 MMHG | WEIGHT: 172 LBS | TEMPERATURE: 98.3 F | BODY MASS INDEX: 29.37 KG/M2

## 2018-01-13 NOTE — PROGRESS NOTES
Assessment    1  Diabetes mellitus type II, uncontrolled (250 02) (E11 65)   2  Diabetes mellitus with microalbuminuria (250 40,791 0) (E11 29,R80 9)   3  Hypertension (401 9) (I10)   4  Hyperlipidemia (272 4) (E78 5)   5  Abnormal thyroid function test (794 5) (R94 6)    Plan  Abnormal thyroid function test    · (1) TSH WITH FT4 REFLEX; Status:Active; Requested for:24Fsj6890;   Diabetes mellitus type II, uncontrolled    · (1) HEMOGLOBIN A1C; Status:Active; Requested for:65Fny8753;    · (1) MICROALBUMIN CREATININE RATIO, RANDOM URINE; Status:Active; Requested for:83Ogt6361;   Diabetes mellitus type II, uncontrolled, Diabetes mellitus with microalbuminuria    · *VB - Foot Exam; Status:Active; Requested for:79Wrw0927;   Hypertension    · (1) COMPREHENSIVE METABOLIC PANEL; Status:Active; Requested for:76Bqt6510;    · Follow-up visit in 4 Months Evaluation and Treatment  AWV + F/U  Status: Hold For - Scheduling   Requested for: 46EHP8976    Discussion/Summary  Discussion Summary:     1  DM2 with microalbuminuria - A1c has increased to 7 8 on lantus 40units daily  Advised following diabetic diet, her FBS are adequate but evening sugars are elevated  Monitor for s/s of hypoglycemia  She is on ACEI, microalbuminuria improved  Foot exam performed  2  essential HTN -stable on lisinopril 10mg daily, amlodipine 5mg daily and HCTZ 12 5mg daily  Monitor BMP  3  HLD - stable  Triglycerides are decreasing  Continue on pravastatin 20mg daily  4  abnormal TSH - TSH mildly suppressed with normal FT4  Recheck  5  RLL lung cancer s/p lobectomy- on surveillance by Thoracic  6  h/o lymphoma - will follow CBC and CMP  She has been released by Onc  7  h/o superficial transitional cell bladder cancer - on surveillance by Urology  Chief Complaint  Chief Complaint Chronic Condition [facility]: Patient is here today for follow up of chronic conditions described in HPI        History of Present Illness  HPI: 81yo female with HTN, HLD, RLL lung cancer s/p lobectomy, DM2, remote history of lymphoma here for follow up care  She is accompanied by her daughter-in-law Nolberto Wagner  Patient recently moved in with her son and his family  Primary Care Diagnostic [facility]: The patient is here today for a follow-up visit  Her diabetes mellitus type 2 is unstable and worsening  the patient is adherent with her medication regimen  She denies medication side effects  (-130s, -200s  Has had no hypoglycemic events)  Her hyperlipidemia has been stable  the patient is adherent with her medication regimen  She denies medication side effects  Medication(s): a statin  Her hypertension is primary, but stable  the patient is adherent with her medication regimen  She denies medication side effects  (She does not monitor her home bp)  Symptoms: denies chest pain, denies dyspnea and stable lower extremity edema  Associated symptoms include recent weight loss  Lifestyle and Disease Management: Weight Issues: She has weight concerns  Exercise: She does not exercise regularly  Review of Systems  Complete-Female:   Constitutional: recent weight gain and feeling tired  Eyes: eyesight problems  Cardiovascular: lower extremity edema, but no chest pain and no palpitations  Respiratory: No complaints of shortness of breath, no wheezing, no cough, no SOB on exertion, no orthopnea, no PND  Gastrointestinal: heartburn  Genitourinary: nocturia, but no dysuria and no incontinence  Musculoskeletal: joint stiffness, pain in the upper arm and foot problems  Neurological: numbness, tingling and difficulty walking, but no headache and no dizziness  Psychiatric: anxiety and sleep disturbances, but no depression  Active Problems    1  Abnormal thyroid function test (794 5) (R94 6)   2  Adenocarcinoma of lung, right (162 9) (C34 91)   3  Adenocarcinoma of lung, unspecified laterality (162 9) (C34 90)   4  Anemia (285 9) (D64 9)   5   Diabetes mellitus type II, uncontrolled (250 02) (E11 65)   6  Diabetes mellitus with microalbuminuria (250 40,791 0) (E11 29,R80 9)   7  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)   8  Encounter for screening mammogram for malignant neoplasm of breast (V76 12) (Z12 31)   9  Glaucoma (365 9) (H40 9)   10  Hyperlipidemia (272 4) (E78 5)   11  Hypertension (401 9) (I10)   12  Need for prophylactic vaccination and inoculation against influenza (V04 81) (Z23)   13  Screening for genitourinary condition (V81 6) (Z13 89)   14  Screening for neurological condition (V80 09) (Z13 89)   15  Squamous cell lung cancer (162 9) (C34 90)    Past Medical History    1  History of Acute bronchitis, unspecified organism (466 0) (J20 9)   2  History of Bladder Cancer (V10 51)   3  History of Closed fracture of neck of left humerus, with routine healing, subsequent encounter   (V54 11) (S42 212D)   4  History of Diabetes Mellitus (250 00)   5  History of gastroesophageal reflux (GERD) (V12 79) (Z87 19)   6  History of hyperlipidemia (V12 29) (Z86 39)   7  History of hypertension (V12 59) (Z86 79)   8  History of non-Hodgkin's lymphoma (V10 79) (Z85 72)   9  History of Large Cell Lymphoma (200 70)   10  History of Liver Cancer (V10 07)   11  History of Lung Cancer (V10 11)   12  History of Lymphoma (V10 71)   13  History of Osteoporosis (733 00) (M81 0)   14  History of Other closed displaced fracture of proximal end of left humerus, initial encounter (812 09)    (S42 292A)   15  History of Right cataract (366 9) (H26 9)   16  Status post fall (V15 88) (Z91 81)   17  History of Visit for pre-operative examination (V72 84) (D86 182)  Active Problems And Past Medical History Reviewed: The active problems and past medical history were reviewed and updated today  Surgical History    1  History of Cataract Surgery   2  History of Hepatic Lobectomy   3  History of Lung Lobectomy  Surgical History Reviewed:    The surgical history was reviewed and updated today  Family History  Mother    1  Family history of Adenocarcinoma Of The Accessory Sinuses (V16 2)   2  Family history of Benign Brain Tumor  Father    3  Family history of Diabetes Mellitus (V18 0)   4  Family history of Leukemia (V16 6)  Sister    5  Family history of Alcoholic (Laennec's) Cirrhosis  Family History    6  Family history of Family Health Status Of Father -    9  Family history of Family Health Status Of Mother -   Family History Reviewed: The family history was reviewed and updated today  Social History    · Denied: History of Drug Use   · Former smoker (V15 82) (J99 995)   · Marital History -    · No alcohol use   · Occupation: Retired  Social History Reviewed: The social history was reviewed and is unchanged  Current Meds   1  AmLODIPine Besylate 5 MG Oral Tablet; take one tablet once daily; Therapy: 65FTU6077 to (Evaluate:32Yjs1179)  Requested for: 70HXG0920; Last Rx:2017   Ordered   2  BD Pen Needle Ultrafine 29G X 12 7MM Miscellaneous; testing twice daily; Therapy: 55MIR1465 to (Evaluate:2016); Last Rx:75Hkm9768 Ordered   3  Calcium Citrate + D3 250-200 MG-UNIT Oral Tablet; Therapy: 2013 to Recorded   4  HydroCHLOROthiazide 12 5 MG Oral Tablet; TAKE 1 TABLET DAILY AS NEEDED; Therapy: 2016 to (Evaluate:2017)  Requested for: 98VZU9709; Last Rx:2016   Ordered   5  Lantus SoloStar 100 UNIT/ML Subcutaneous Solution Pen-injector; inject 40 unit daily; Therapy: 38Pdu9266 to (Evaluate:2017)  Requested for: 2017; Last Rx:2017   Ordered   6  Lisinopril 10 MG Oral Tablet; TAKE 1 TABLET DAILY; Therapy: 2016 to (Evaluate:82Anz4542)  Requested for: 85VKJ9674; Last Rx:2017   Ordered   7  Multivitamins TABS; TAKE 1 TABLET DAILY; Therapy: 2013 to (Evaluate:59Ogk9216) Recorded   8  Pravastatin Sodium 20 MG Oral Tablet; TAKE 1 TABLET DAILY;    Therapy: 2013 to (Evaluate:17Oct2017)  Requested for: 20Apr2017; Last Rx:20Apr2017   Ordered   9  PriLOSEC OTC 20 MG Oral Tablet Delayed Release; take 1 tablet every other day; Therapy: 12Apr2013 to (Evaluate:28Mar2016) Recorded   10  Vitamin B-12 100 MCG Oral Tablet; Therapy: 12Apr2013 to Recorded  Medication List Reviewed: The medication list was reviewed and updated today  Allergies    1  No Known Drug Allergies    2  No Known Environmental Allergies   3  No Known Food Allergies    Vitals  Vital Signs    Recorded: 33Hei3414 10:49AM Recorded: 45RXC6843 10:26AM   Temperature  98 4 F   Heart Rate  85   Respiration  16   Systolic 772, RUE, Sitting 335   Diastolic 72, RUE, Sitting 100   Height  5 ft 4 in   Weight  169 lb 6 0 oz   BMI Calculated  29 07   BSA Calculated  1 82   O2 Saturation  95     Physical Exam    Constitutional   General appearance: No acute distress, well appearing and well nourished  obese  Ears, Nose, Mouth, and Throat   Oropharynx: Normal with no erythema, edema, exudate or lesions  wears dentures, MMM  Pulmonary   Respiratory effort: No increased work of breathing or signs of respiratory distress  Auscultation of lungs: Clear to auscultation  Cardiovascular   Auscultation of heart: Normal rate and rhythm, normal S1 and S2, without murmurs  Examination of extremities for edema and/or varicosities: Abnormal   bilateral ankle trace+ pitting edema  Neurologic No focal deficit  Psychiatric   Orientation to person, place, and time: Normal     Mood and affect: Normal     Diabetic Foot Screen: Abnormal     Socks and shoes removed, the Right Foot: the foot was dry  The toes on the right were right toes onychomycosis  Normal tactile sensation with monofilament testing throughout the right foot  Socks and shoes removed, Left Foot: the foot was dry  Normal tactile sensation with monofilament testing throughout the left foot     Pulses:   1+ in the posterior tibialis on the right   1+ in the dorsalis pedis on the right  Pulses:   1+ in the posterior tibialis on the left   1+ in the dorsalis pedis on the left  Assign Risk Category: 1: No loss of protective sensation, deformity present  Impending risk  Results/Data  (1) HEMOGLOBIN A1C 13MBN2248 08:17AM [facility] Order Number: [id number]     Test Name Result Flag Reference   HEMOGLOBIN A1C 7 8 % H 4 2-6 3   EST  AVG  GLUCOSE 177 mg/dl       (1) MICROALBUMIN CREATININE RATIO, RANDOM URINE 83PBN8628 08:17AM [facility] Order Number: [id number]     Test Name Result Flag Reference   MICROALBUMIN/ CREAT R 21 mg/g creatinine  0-30   MICROALBUMIN,URINE 12 9 mg/L  0 0-20 0   CREATININE URINE 62 4 mg/dL       (1) LIPID PANEL FASTING W DIRECT LDL REFLEX 30DVM0034 08:17AM [facility] Order Number: [id number]     Test Name Result Flag Reference   CHOLESTEROL 144 mg/dL     LDL CHOLESTEROL CALCULATED 65 mg/dL  0-100   - Patient Instructions: This is a fasting blood test  Water, black tea or black coffee only after 9:00pm the night before test   Drink 2 glasses of water the morning of test       Triglyceride:         Normal              <150 mg/dl       Borderline High    150-199 mg/dl       High               200-499 mg/dl       Very High          >499 mg/dl  Cholesterol:         Desirable        <200 mg/dl      Borderline High  200-239 mg/dl      High             >239 mg/dl  HDL Cholesterol:        High    >59 mg/dL      Low     <41 mg/dL  LDL Cholesterol:        Optimal          <100 mg/dl        Near Optimal     100-129 mg/dl        Above Optimal          Borderline High   130-159 mg/dl          High              160-189 mg/dl          Very High        >189 mg/dl  LDL CALCULATED:    This screening LDL is a calculated result  It does not have the accuracy of the Direct Measured LDL in the monitoring of patients with hyperlipidemia and/or statin therapy     Direct Measure LDL (WWU402) must be ordered separately in these patients  TRIGLYCERIDES 188 mg/dL H <=150   Specimen collection should occur prior to N-Acetylcysteine or Metamizole administration due to the potential for falsely depressed results  HDL,DIRECT 41 mg/dL  40-60   Specimen collection should occur prior to Metamizole administration due to the potential for falsely depressed results  (1) TSH WITH FT4 REFLEX 28Xfb8163 08:17AM Ethan Dennis    Order Number: [id number]     Test Name Result Flag Reference   TSH 0 268 uIU/mL L 0 358-3 740   Patients undergoing fluorescein dye angiography may retain small amounts of fluorescein in the body for 48-72 hours post procedure  Samples containing fluorescein can produce falsely depressed TSH values  If the patient had this procedure,a specimen should be resubmitted post fluorescein clearance  The recommended reference ranges for TSH during pregnancy are as follows:  First trimester 0 1 to 2 5 uIU/mL  Second trimester  0 2 to 3 0 uIU/mL  Third trimester 0 3 to 3 0 uIU/m   T4,FREE 1 10 ng/dL  0 76-1 46     Future Appointments    Date/Time Provider Specialty Site   03/12/2018 10:00 AM MOISÉS Santos   Thoracic Surgery [facility]   11/13/2017 10:45 AM Ethan Dennis DO Internal Medicine [facility] INTERNAL MED     Signatures   Electronically signed by : Jihan Clark DO; Jul 14 2017 11:50AM EST                       (Author)

## 2018-01-14 VITALS
BODY MASS INDEX: 29.88 KG/M2 | HEART RATE: 97 BPM | OXYGEN SATURATION: 97 % | TEMPERATURE: 96.5 F | SYSTOLIC BLOOD PRESSURE: 151 MMHG | WEIGHT: 175 LBS | DIASTOLIC BLOOD PRESSURE: 67 MMHG | HEIGHT: 64 IN

## 2018-01-14 VITALS
SYSTOLIC BLOOD PRESSURE: 128 MMHG | RESPIRATION RATE: 16 BRPM | TEMPERATURE: 98 F | OXYGEN SATURATION: 95 % | WEIGHT: 170.13 LBS | DIASTOLIC BLOOD PRESSURE: 80 MMHG | BODY MASS INDEX: 29.05 KG/M2 | HEART RATE: 98 BPM | HEIGHT: 64 IN

## 2018-01-15 NOTE — RESULT NOTES
Verified Results  * XR HUMERUS LEFT 18IUO6009 11:16AM Gayla Kim Order Number: [id number]     Test Name Result Flag Reference   XR HUMERUS LEFT (Report)     LEFT HUMERUS     INDICATION: Follow-up fracture     COMPARISON: July 17     VIEWS: 2; 2 images     FINDINGS:     There is a comminuted left humeral neck fracture  There is no significant alteration in overall alignment or angulation compared to the prior  Fracture lines remain visible  Mild bone productive changes seen about the proximal metaphysis  However,    there is a moderate degree of inferior subluxation, not previously seen  Subacromial space measuring up to 2 cm  No lytic or blastic lesions are seen  Soft tissues are unremarkable  IMPRESSION:     Left humeral neck fracture with mild new bone formation  Stable alignment  New subluxation         Workstation performed: [id number]     Signed by:   Abhay Walls MD   8/22/16

## 2018-01-15 NOTE — RESULT NOTES
Verified Results  * XR SHOULDER 2+ VIEW LEFT 44Pte5657 11:07AM Bryn Mauro Order Number: [id number]     Test Name Result Flag Reference   XR SHOULDER 2+ VW LEFT (Report)     LEFT SHOULDER     INDICATION: Follow-up fracture of surgical neck of the left humerus  COMPARISON: July 17, 2016  VIEWS: 3; 3 images     FINDINGS:     Comminuted, impacted fracture of the left humeral surgical neck  Bone callus formation since the last study  Improved alignment  Inferior subluxation of the humeral head with respect to the glenoid process  No degenerative changes  No lytic or blastic lesions are seen  Soft tissues are unremarkable  IMPRESSION:     Healing fracture of the left humeral neck         Workstation performed: [id number]     Signed by:   Matias Servin MD   9/20/16

## 2018-03-05 ENCOUNTER — HOSPITAL ENCOUNTER (OUTPATIENT)
Dept: RADIOLOGY | Age: 83
Discharge: HOME | End: 2018-03-05
Payer: MEDICARE

## 2018-03-05 DIAGNOSIS — C34.90 MALIGNANT NEOPLASM OF UNSPECIFIED PART OF UNSPECIFIED BRONCHUS OR LUNG (HCC): ICD-10-CM

## 2018-03-05 PROCEDURE — 71250 CT THORAX DX C-: CPT

## 2018-03-16 ENCOUNTER — OFFICE VISIT (OUTPATIENT)
Dept: CARDIAC SURGERY | Facility: CLINIC | Age: 83
End: 2018-03-16
Payer: MEDICARE

## 2018-03-16 ENCOUNTER — APPOINTMENT (OUTPATIENT)
Dept: LAB | Facility: CLINIC | Age: 83
End: 2018-03-16
Payer: MEDICARE

## 2018-03-16 VITALS
SYSTOLIC BLOOD PRESSURE: 137 MMHG | OXYGEN SATURATION: 95 % | DIASTOLIC BLOOD PRESSURE: 60 MMHG | WEIGHT: 170 LBS | BODY MASS INDEX: 29.02 KG/M2 | HEIGHT: 64 IN | TEMPERATURE: 98.3 F | HEART RATE: 86 BPM

## 2018-03-16 DIAGNOSIS — C34.91 SQUAMOUS CELL LUNG CANCER, RIGHT (HCC): Primary | ICD-10-CM

## 2018-03-16 DIAGNOSIS — E78.5 HYPERLIPIDEMIA: ICD-10-CM

## 2018-03-16 DIAGNOSIS — E11.29 TYPE 2 DIABETES MELLITUS WITH OTHER DIABETIC KIDNEY COMPLICATION (CODE): ICD-10-CM

## 2018-03-16 DIAGNOSIS — E11.65 TYPE 2 DIABETES MELLITUS WITH HYPERGLYCEMIA (HCC): ICD-10-CM

## 2018-03-16 DIAGNOSIS — I10 ESSENTIAL (PRIMARY) HYPERTENSION: ICD-10-CM

## 2018-03-16 LAB
ALBUMIN SERPL BCP-MCNC: 3.5 G/DL (ref 3.5–5)
ALP SERPL-CCNC: 63 U/L (ref 46–116)
ALT SERPL W P-5'-P-CCNC: 18 U/L (ref 12–78)
ANION GAP SERPL CALCULATED.3IONS-SCNC: 6 MMOL/L (ref 4–13)
AST SERPL W P-5'-P-CCNC: 15 U/L (ref 5–45)
BILIRUB SERPL-MCNC: 0.23 MG/DL (ref 0.2–1)
BUN SERPL-MCNC: 38 MG/DL (ref 5–25)
CALCIUM SERPL-MCNC: 9.1 MG/DL (ref 8.3–10.1)
CHLORIDE SERPL-SCNC: 106 MMOL/L (ref 100–108)
CHOLEST SERPL-MCNC: 134 MG/DL (ref 50–200)
CO2 SERPL-SCNC: 27 MMOL/L (ref 21–32)
CREAT SERPL-MCNC: 1.35 MG/DL (ref 0.6–1.3)
CREAT UR-MCNC: 68.2 MG/DL
EST. AVERAGE GLUCOSE BLD GHB EST-MCNC: 177 MG/DL
GFR SERPL CREATININE-BSD FRML MDRD: 36 ML/MIN/1.73SQ M
GLUCOSE P FAST SERPL-MCNC: 105 MG/DL (ref 65–99)
HBA1C MFR BLD: 7.8 % (ref 4.2–6.3)
HDLC SERPL-MCNC: 45 MG/DL (ref 40–60)
LDLC SERPL CALC-MCNC: 55 MG/DL (ref 0–100)
MICROALBUMIN UR-MCNC: 10.4 MG/L (ref 0–20)
MICROALBUMIN/CREAT 24H UR: 15 MG/G CREATININE (ref 0–30)
POTASSIUM SERPL-SCNC: 4.7 MMOL/L (ref 3.5–5.3)
PROT SERPL-MCNC: 7.6 G/DL (ref 6.4–8.2)
SODIUM SERPL-SCNC: 139 MMOL/L (ref 136–145)
TRIGL SERPL-MCNC: 168 MG/DL

## 2018-03-16 PROCEDURE — 99213 OFFICE O/P EST LOW 20 MIN: CPT | Performed by: PHYSICIAN ASSISTANT

## 2018-03-16 PROCEDURE — 80061 LIPID PANEL: CPT

## 2018-03-16 PROCEDURE — 82043 UR ALBUMIN QUANTITATIVE: CPT

## 2018-03-16 PROCEDURE — 83036 HEMOGLOBIN GLYCOSYLATED A1C: CPT

## 2018-03-16 PROCEDURE — 36415 COLL VENOUS BLD VENIPUNCTURE: CPT

## 2018-03-16 PROCEDURE — 82570 ASSAY OF URINE CREATININE: CPT

## 2018-03-16 PROCEDURE — 80053 COMPREHEN METABOLIC PANEL: CPT

## 2018-03-16 RX ORDER — HYDROCHLOROTHIAZIDE 12.5 MG/1
12.5 TABLET ORAL DAILY
COMMUNITY
End: 2018-11-16 | Stop reason: SDUPTHER

## 2018-03-16 RX ORDER — LISINOPRIL 10 MG/1
10 TABLET ORAL DAILY
COMMUNITY
End: 2018-07-09 | Stop reason: SDUPTHER

## 2018-03-16 NOTE — PROGRESS NOTES
Thoracic Follow-Up  Assessment/Plan:    Squamous cell lung cancer, right [facility])  Ms Wes Cruz is stable from a thoracic surgery and lung cancer standpoint  Her most recent CT scan does not reveal any evidence of metastatic or recurrent disease  Therefore, we will continue routine surveillance and she will return in one year with a new CT scan  Diagnoses and all orders for this visit:    Squamous cell lung cancer, right ([address] )  -     CT chest wo contrast; Future    Other orders  -     insulin glargine (BASAGLAR KWIKPEN) injection pen 100 units/mL; Inject 34 Units under the skin daily  -     hydrochlorothiazide (HYDRODIURIL) 12 5 mg tablet; Take 12 5 mg by mouth daily  -     lisinopril (ZESTRIL) 10 mg tablet; Take 10 mg by mouth daily          Thoracic History     Diagnosis: Stage IA non-small cell lung carcinoma  Procedures: 1  Flexible bronchoscopy, right thoracoscopic lower lobe wedge resection, completion lobectomy, and mediastinal lymph node dissection performed on March 1, 2012  Reed Heckler Flexible bronchoscopy with endobronchial washings performed on March 4, 2012  Reed Heckler Flexible bronchoscopy, endobronchial washings performed on March 7, 2012  Reed Heckler Flexible bronchoscopy, endobronchial washings, and right chest tube insertion performed on March 9, 2012  Pathology: Right lower lobe is consistent with moderately differentiated squamous cell carcinoma measuring 1 2 cm in greatest dimension  There was no evidence of visceral pleural invasion or lymphovascular invasion  All lymph nodes including levels right paratracheal, 7, 9, 10, and 12 were negative for metastatic carcinoma  Seventh Edition AJCC tumor stage IA (T1a, N0,M0)  Adjuvant Therapy: None indicated per NCCN guidelines  Patient ID: Yuki Schaeffer is a 80 y o  female      HPI  HPI: Yuki Schaeffer is an 20-PGYP[age]PIY female with a history of stage IA squamous cell carcinoma of the right lower lobe status post right thoracoscopic lower lobectomy on March 1, 2012  She continues to follow in our office for routine cancer surveillance  She was last seen in March 2017, at which point her CT scan from March 6, 2017 demonstrated minimal scarring in the right middle lobe without evidence of disease recurrence  She returns today, 6 years out from resection, with a CT Scan from 3/5/18  This demonstrated unchanged right middle lobe scarring, without any nodules or lymphadenopathy  On discussion, she feels well  She does have fatigue and shortness of breath, but denies fever, chills, weight loss, cough, or hemoptysis  Review of Systems   Constitutional: Positive for activity change and fatigue  Negative for unexpected weight change  HENT: Negative for sore throat and trouble swallowing  Respiratory: Positive for shortness of breath  Negative for cough and chest tightness  Cardiovascular: Negative for chest pain  Gastrointestinal: Negative for abdominal pain and nausea  Neurological: Negative for dizziness and syncope  Psychiatric/Behavioral: Negative for agitation and behavioral problems  Objective:   Physical Exam   Constitutional: She is oriented to person, place, and time  She appears well-developed and well-nourished  HENT:   Head: Normocephalic and atraumatic  Eyes: EOM are normal    Neck: Normal range of motion  Neck supple  Cardiovascular: Normal rate, regular rhythm and normal heart sounds  Abdominal: Soft  Bowel sounds are normal  She exhibits no distension  Lymphadenopathy:     She has no cervical adenopathy  Neurological: She is alert and oriented to person, place, and time  Skin: Skin is dry  Psychiatric: Her behavior is normal  Thought content normal    Vitals reviewed    /60 (BP Location: Left arm, Patient Position: Sitting, Cuff Size: Adult)   Pulse 86   Temp 98 3 °F (36 8 °C)   Ht 5' 4" (1 626 m)   Wt 77 1 kg (170 lb)   SpO2 95%   BMI 29 18 kg/m²     Ct Chest Wo Contrast    Result Date: 3/7/2018  Narrative CT CHEST WITHOUT IV CONTRAST INDICATION: 51-year-old woman with history of lung cancer status post right lower lobectomy  COMPARISON: Chest CT 3/6/2017 TECHNIQUE: CT examination of the chest was performed without intravenous contrast   Axial, sagittal, and coronal 2D reformatted images were created from the source data and submitted for interpretation  Radiation dose length product (DLP) for this visit:  338 mGy-cm   This examination, like all CT scans performed in the [facility], was performed utilizing techniques to minimize radiation dose exposure, including the use of iterative reconstruction and automated exposure control  FINDINGS: LUNGS: The patient has undergone right lower lobectomy  Minimal scarring in the right middle lobe is unchanged  No new or suspicious pulmonary lesions  There is no tracheal or endobronchial lesion  PLEURA:  Unremarkable  HEART/GREAT VESSELS:  Normal heart size  Coronary artery disease  Aortic atherosclerosis without aneurysm  Unchanged enlargement of the main pulmonary artery is suggestive of pulmonary artery hypertension  MEDIASTINUM AND CASSIE:  Unremarkable  CHEST WALL AND LOWER NECK:   Unremarkable  VISUALIZED STRUCTURES IN THE UPPER ABDOMEN:  Unchanged small hiatal hernia  Right kidney cysts is incompletely included  Bilateral adrenal thickening without underlying mass is unchanged in appearance  Cholelithiasis    OSSEOUS STRUCTURES:  No acute fracture or destructive osseous lesion  Remote fracture deformities of the right 3rd and 4th ribs are unchanged  T11 compression fracture is unchanged since 3/6/2015  Impression 1  Status post right lower lobectomy without evidence of recurrent or metastatic disease within the chest  2   Unchanged main pulmonary artery enlargement is suggestive of pulmonary artery hypertension  3   Unchanged small hiatal hernia, cholelithiasis and T11 compression fracture   4   Unchanged bilateral adrenal thickening suggestive of hyperplasia, without discrete underlying nodule   Workstation performed: [id number]

## 2018-03-26 ENCOUNTER — OFFICE VISIT (OUTPATIENT)
Dept: INTERNAL MEDICINE CLINIC | Facility: CLINIC | Age: 83
End: 2018-03-26
Payer: MEDICARE

## 2018-03-26 VITALS
RESPIRATION RATE: 16 BRPM | SYSTOLIC BLOOD PRESSURE: 132 MMHG | BODY MASS INDEX: 30.12 KG/M2 | HEIGHT: 63 IN | OXYGEN SATURATION: 98 % | HEART RATE: 90 BPM | TEMPERATURE: 97.8 F | WEIGHT: 170 LBS | DIASTOLIC BLOOD PRESSURE: 80 MMHG

## 2018-03-26 DIAGNOSIS — K21.9 CHRONIC GERD: ICD-10-CM

## 2018-03-26 DIAGNOSIS — I10 ESSENTIAL HYPERTENSION: Chronic | ICD-10-CM

## 2018-03-26 DIAGNOSIS — IMO0002 UNCONTROLLED TYPE 2 DIABETES MELLITUS WITH MICROALBUMINURIA, WITHOUT LONG-TERM CURRENT USE OF INSULIN: ICD-10-CM

## 2018-03-26 DIAGNOSIS — R80.9 DIABETES MELLITUS WITH MICROALBUMINURIA (HCC): Primary | ICD-10-CM

## 2018-03-26 DIAGNOSIS — E11.29 DIABETES MELLITUS WITH MICROALBUMINURIA (HCC): Primary | ICD-10-CM

## 2018-03-26 DIAGNOSIS — E78.2 MIXED HYPERLIPIDEMIA: ICD-10-CM

## 2018-03-26 PROBLEM — C34.91 ADENOCARCINOMA OF LUNG, RIGHT (HCC): Status: ACTIVE | Noted: 2017-03-13

## 2018-03-26 PROCEDURE — 99214 OFFICE O/P EST MOD 30 MIN: CPT | Performed by: INTERNAL MEDICINE

## 2018-03-26 RX ORDER — PRAVASTATIN SODIUM 20 MG
20 TABLET ORAL DAILY
Qty: 90 TABLET | Refills: 0 | Status: SHIPPED | OUTPATIENT
Start: 2018-03-26 | End: 2018-04-02 | Stop reason: SDUPTHER

## 2018-03-26 NOTE — PROGRESS NOTES
Diabetic Foot Exam    Patient's shoes and socks removed  Right Foot/Ankle   Right Foot Inspection  Skin Exam: dry skin, callus and callus                          Toe Exam: right toe deformity  Sensory       Monofilament testing: intact  Vascular    The right DP pulse is 2+  The right PT pulse is 2+  Left Foot/Ankle  Left Foot Inspection  Skin Exam: dry skin and callus                         Toe Exam: ROM and strength within normal limits and left toe deformity                   Sensory       Monofilament: intact  Vascular    The left DP pulse is 2+  The left PT pulse is 2+  Assign Risk Category:  Deformity present; No loss of protective sensation;  No weak pulses       Risk: 1

## 2018-03-26 NOTE — ASSESSMENT & PLAN NOTE
bp acceptable on current regimen of amlodipine 5mg daily, lisinopril 10mg daily and HCTZ 12 5mg daily  Monitor BMP

## 2018-03-26 NOTE — ASSESSMENT & PLAN NOTE
Dm2 with microalbuminuria - A1c has increased to 7 8 with lowered dose of basaglar to 34units, however she has not had hypoglycemic symptoms  Will continue present regimen, med renewed  Repeat A1c prior to next visit  She is on ACEI  Foot exam performed

## 2018-03-26 NOTE — PROGRESS NOTES
Assessment/Plan:    Chronic GERD  Advised may d/c omeprazole now and continue to take mylanta or H2b for prn use  Diabetes mellitus type II, uncontrolled ([facility] [address] )  Dm2 with microalbuminuria - A1c has increased to 7 8 with lowered dose of basaglar to 34units, however she has not had hypoglycemic symptoms  Will continue present regimen, med renewed  Repeat A1c prior to next visit  She is on ACEI  Foot exam performed  Diabetes mellitus with microalbuminuria (HCC)  Improved on lisinopril    Hyperlipidemia  Controlled on pravastatin 20mg daily, med renewed    Essential hypertension  bp acceptable on current regimen of amlodipine 5mg daily, lisinopril 10mg daily and HCTZ 12 5mg daily  Monitor BMP  1  Essential hypertension  Comprehensive metabolic panel   2  Diabetes mellitus with microalbuminuria (HCC)  insulin glargine (BASAGLAR KWIKPEN) injection pen 100 units/mL    HEMOGLOBIN A1C W/ EAG ESTIMATION    Microalbumin / creatinine urine ratio   3  Uncontrolled type 2 diabetes mellitus with microalbuminuria, without long-term current use of insulin (HCC)  insulin glargine (BASAGLAR KWIKPEN) injection pen 100 units/mL    HEMOGLOBIN A1C W/ EAG ESTIMATION    Microalbumin / creatinine urine ratio   4  Mixed hyperlipidemia  pravastatin (PRAVACHOL) 20 mg tablet   5  Chronic GERD          Subjective:      Patient ID: Lv Saleh is a 80 y o  female  79yo female with Dm2 w/microalbuminuria, HTN, GERD, HLD, RLL lung cancer s/p lobectomy, h/o lymphoma and h/o superficial transitional cell bladder cancer here for follow up care  Diabetes   She presents for her follow-up diabetic visit  She has type 2 diabetes mellitus  There are no hypoglycemic associated symptoms  (Had hypoglycemic episodes with lantus 40units therefore she was decreased to basaglar to 34units, med changed due to insurance) There are no hypoglycemic complications  Current diabetic treatment includes insulin injections   She is compliant with treatment all of the time  She is following a diabetic diet  Her breakfast blood glucose range is generally 140-180 mg/dl  Her dinner blood glucose range is generally 180-200 mg/dl  An ACE inhibitor/angiotensin II receptor blocker is being taken  Eye exam is current  Hypertension   This is a chronic problem  The current episode started more than 1 year ago  The problem is controlled (she does not monitor her home bp)  Past treatments include ACE inhibitors, diuretics and calcium channel blockers  Hyperlipidemia   This is a chronic problem  The current episode started more than 1 year ago  The problem is controlled  Current antihyperlipidemic treatment includes statins  The current treatment provides significant improvement of lipids  She has been able to wean down use of omeprazole 20mg to three times weekly, if that  It all depends on what she eats  Also takes mylanta as needed  The following portions of the patient's history were reviewed and updated as appropriate: allergies, current medications, past family history, past medical history, past social history, past surgical history and problem list     Current Outpatient Prescriptions:     acetaminophen (TYLENOL) 325 mg tablet, Take 2 tablets (650 mg total) by mouth every 6 (six) hours as needed for mild pain, headaches or fever  , Disp: 30 tablet, Rfl: 0    amLODIPine (NORVASC) 5 mg tablet, Take 5 mg by mouth daily  , Disp: , Rfl:     Biotin 5000 MCG CAPS, Take by mouth, Disp: , Rfl:     calcium citrate-vitamin D (CITRACAL+D) 315-200 MG-UNIT per tablet, Take 1 tablet by mouth daily  , Disp: , Rfl:     Cyanocobalamin (VITAMIN B-12 PO), Take 1 tablet by mouth , Disp: , Rfl:     hydrochlorothiazide (HYDRODIURIL) 12 5 mg tablet, Take 12 5 mg by mouth daily, Disp: , Rfl:     insulin glargine (BASAGLAR KWIKPEN) injection pen 100 units/mL, Inject 0 34 mL (34 Units total) under the skin daily, Disp: 10 pen, Rfl: 0    Insulin Pen Needle (BD ULTRA-FINE PEN NEEDLES) 29G X 12 7MM MISC, by Does not apply route 2 (two) times a day, Disp: , Rfl:     lisinopril (ZESTRIL) 10 mg tablet, Take 10 mg by mouth daily, Disp: , Rfl:     Multiple Vitamin (MULTIVITAMIN) tablet, Take 1 tablet by mouth daily  , Disp: , Rfl:     omeprazole (PriLOSEC) 20 mg delayed release capsule, Take 20 mg by mouth daily  , Disp: , Rfl:     pravastatin (PRAVACHOL) 20 mg tablet, Take 1 tablet (20 mg total) by mouth daily, Disp: 90 tablet, Rfl: 0    Review of Systems   Constitutional: Negative  HENT: Negative  Respiratory: Negative  Cardiovascular: Negative  Gastrointestinal:        Minimal heartburn   Genitourinary: Negative  Neurological: Negative  Objective:  /80 (BP Location: Left arm, Patient Position: Sitting)   Pulse 90   Temp 97 8 °F (36 6 °C)   Resp 16   Ht 5' 3" (1 6 m)   Wt 77 1 kg (170 lb)   SpO2 98%   BMI 30 11 kg/m²        Physical Exam   Constitutional: She is oriented to person, place, and time  She appears well-developed and well-nourished  No distress  HENT:   Mouth/Throat: Oropharynx is clear and moist    Neck: Neck supple  Cardiovascular: Normal rate, regular rhythm, normal heart sounds and intact distal pulses  Trace L ankle edema   Pulmonary/Chest: Effort normal and breath sounds normal  No respiratory distress  Neurological: She is alert and oriented to person, place, and time  Psychiatric: Her behavior is normal    Vitals reviewed        Recent Results (from the past 336 hour(s))   Lipid Panel with Direct LDL reflex    Collection Time: 03/16/18  8:23 AM   Result Value Ref Range    Cholesterol 134 50 - 200 mg/dL    Triglycerides 168 (H) <=150 mg/dL    HDL, Direct 45 40 - 60 mg/dL    LDL Calculated 55 0 - 100 mg/dL   Comprehensive metabolic panel    Collection Time: 03/16/18  8:23 AM   Result Value Ref Range    Sodium 139 136 - 145 mmol/L    Potassium 4 7 3 5 - 5 3 mmol/L    Chloride 106 100 - 108 mmol/L    CO2 27 21 - 32 mmol/L Anion Gap 6 4 - 13 mmol/L    BUN 38 (H) 5 - 25 mg/dL    Creatinine 1 35 (H) 0 60 - 1 30 mg/dL    Glucose, Fasting 105 (H) 65 - 99 mg/dL    Calcium 9 1 8 3 - 10 1 mg/dL    AST 15 5 - 45 U/L    ALT 18 12 - 78 U/L    Alkaline Phosphatase 63 46 - 116 U/L    Total Protein 7 6 6 4 - 8 2 g/dL    Albumin 3 5 3 5 - 5 0 g/dL    Total Bilirubin 0 23 0 20 - 1 00 mg/dL    eGFR 36 ml/min/1 73sq m   Hemoglobin A1c    Collection Time: 03/16/18  8:23 AM   Result Value Ref Range    Hemoglobin A1C 7 8 (H) 4 2 - 6 3 %     mg/dl   Microalbumin / creatinine urine ratio    Collection Time: 03/16/18  8:23 AM   Result Value Ref Range    Creatinine, Ur 68 2 mg/dL    Microalbum  ,U,Random 10 4 0 0 - 20 0 mg/L    Microalb Creat Ratio 15 0 - 30 mg/g creatinine

## 2018-04-02 DIAGNOSIS — R80.9 DIABETES MELLITUS WITH MICROALBUMINURIA (HCC): ICD-10-CM

## 2018-04-02 DIAGNOSIS — E78.2 MIXED HYPERLIPIDEMIA: ICD-10-CM

## 2018-04-02 DIAGNOSIS — IMO0002 UNCONTROLLED TYPE 2 DIABETES MELLITUS WITH MICROALBUMINURIA, WITHOUT LONG-TERM CURRENT USE OF INSULIN: ICD-10-CM

## 2018-04-02 DIAGNOSIS — E11.29 DIABETES MELLITUS WITH MICROALBUMINURIA (HCC): ICD-10-CM

## 2018-04-02 RX ORDER — INSULIN GLARGINE 100 [IU]/ML
INJECTION, SOLUTION SUBCUTANEOUS
Qty: 1200 ML | Refills: 5 | Status: SHIPPED | OUTPATIENT
Start: 2018-04-02 | End: 2019-03-11 | Stop reason: SDUPTHER

## 2018-04-02 RX ORDER — PRAVASTATIN SODIUM 20 MG
TABLET ORAL
Qty: 90 TABLET | Refills: 1 | Status: SHIPPED | OUTPATIENT
Start: 2018-04-02 | End: 2018-12-10 | Stop reason: SDUPTHER

## 2018-07-05 ENCOUNTER — APPOINTMENT (OUTPATIENT)
Dept: LAB | Facility: CLINIC | Age: 83
End: 2018-07-05
Payer: MEDICARE

## 2018-07-05 DIAGNOSIS — I10 ESSENTIAL HYPERTENSION: Chronic | ICD-10-CM

## 2018-07-05 DIAGNOSIS — R80.9 DIABETES MELLITUS WITH MICROALBUMINURIA (HCC): ICD-10-CM

## 2018-07-05 DIAGNOSIS — E11.29 DIABETES MELLITUS WITH MICROALBUMINURIA (HCC): ICD-10-CM

## 2018-07-05 DIAGNOSIS — IMO0002 UNCONTROLLED TYPE 2 DIABETES MELLITUS WITH MICROALBUMINURIA, WITHOUT LONG-TERM CURRENT USE OF INSULIN: ICD-10-CM

## 2018-07-05 LAB
ALBUMIN SERPL BCP-MCNC: 3.5 G/DL (ref 3.5–5)
ALP SERPL-CCNC: 59 U/L (ref 46–116)
ALT SERPL W P-5'-P-CCNC: 20 U/L (ref 12–78)
ANION GAP SERPL CALCULATED.3IONS-SCNC: 8 MMOL/L (ref 4–13)
AST SERPL W P-5'-P-CCNC: 15 U/L (ref 5–45)
BILIRUB SERPL-MCNC: 0.28 MG/DL (ref 0.2–1)
BUN SERPL-MCNC: 46 MG/DL (ref 5–25)
CALCIUM SERPL-MCNC: 9.2 MG/DL (ref 8.3–10.1)
CHLORIDE SERPL-SCNC: 107 MMOL/L (ref 100–108)
CO2 SERPL-SCNC: 25 MMOL/L (ref 21–32)
CREAT SERPL-MCNC: 1.4 MG/DL (ref 0.6–1.3)
CREAT UR-MCNC: 46.4 MG/DL
EST. AVERAGE GLUCOSE BLD GHB EST-MCNC: 177 MG/DL
GFR SERPL CREATININE-BSD FRML MDRD: 34 ML/MIN/1.73SQ M
GLUCOSE P FAST SERPL-MCNC: 151 MG/DL (ref 65–99)
HBA1C MFR BLD: 7.8 % (ref 4.2–6.3)
MICROALBUMIN UR-MCNC: 15.3 MG/L (ref 0–20)
MICROALBUMIN/CREAT 24H UR: 33 MG/G CREATININE (ref 0–30)
POTASSIUM SERPL-SCNC: 4.7 MMOL/L (ref 3.5–5.3)
PROT SERPL-MCNC: 7.8 G/DL (ref 6.4–8.2)
SODIUM SERPL-SCNC: 140 MMOL/L (ref 136–145)

## 2018-07-05 PROCEDURE — 83036 HEMOGLOBIN GLYCOSYLATED A1C: CPT

## 2018-07-05 PROCEDURE — 82570 ASSAY OF URINE CREATININE: CPT

## 2018-07-05 PROCEDURE — 82043 UR ALBUMIN QUANTITATIVE: CPT

## 2018-07-05 PROCEDURE — 36415 COLL VENOUS BLD VENIPUNCTURE: CPT

## 2018-07-05 PROCEDURE — 80053 COMPREHEN METABOLIC PANEL: CPT

## 2018-07-09 ENCOUNTER — OFFICE VISIT (OUTPATIENT)
Dept: INTERNAL MEDICINE CLINIC | Facility: CLINIC | Age: 83
End: 2018-07-09
Payer: MEDICARE

## 2018-07-09 VITALS
OXYGEN SATURATION: 94 % | RESPIRATION RATE: 16 BRPM | DIASTOLIC BLOOD PRESSURE: 70 MMHG | HEART RATE: 80 BPM | TEMPERATURE: 98.2 F | SYSTOLIC BLOOD PRESSURE: 116 MMHG | HEIGHT: 63 IN | WEIGHT: 168 LBS | BODY MASS INDEX: 29.77 KG/M2

## 2018-07-09 DIAGNOSIS — IMO0002 UNCONTROLLED TYPE 2 DIABETES MELLITUS WITH MICROALBUMINURIA, WITHOUT LONG-TERM CURRENT USE OF INSULIN: ICD-10-CM

## 2018-07-09 DIAGNOSIS — R80.9 DIABETES MELLITUS WITH MICROALBUMINURIA (HCC): Primary | ICD-10-CM

## 2018-07-09 DIAGNOSIS — E11.29 DIABETES MELLITUS WITH MICROALBUMINURIA (HCC): Primary | ICD-10-CM

## 2018-07-09 DIAGNOSIS — I10 ESSENTIAL HYPERTENSION: Chronic | ICD-10-CM

## 2018-07-09 DIAGNOSIS — K21.9 CHRONIC GERD: ICD-10-CM

## 2018-07-09 PROCEDURE — 99214 OFFICE O/P EST MOD 30 MIN: CPT | Performed by: INTERNAL MEDICINE

## 2018-07-09 RX ORDER — LISINOPRIL 10 MG/1
10 TABLET ORAL DAILY
Qty: 90 TABLET | Refills: 1 | Status: SHIPPED | OUTPATIENT
Start: 2018-07-09 | End: 2018-09-08 | Stop reason: SDUPTHER

## 2018-07-09 NOTE — ASSESSMENT & PLAN NOTE
A1c is stable at 7 8  No hypoglycemia reported on basaglar 34units daily  She is on ACEI, foot exam performed

## 2018-07-09 NOTE — ASSESSMENT & PLAN NOTE
Patient getting more episodes of acid reflux with decreased dosing of omeprazole  Advised restarting to daily, eat small frequent meals and avoid ulcergenic foods

## 2018-07-09 NOTE — PROGRESS NOTES
Assessment/Plan:    Chronic GERD  Patient getting more episodes of acid reflux with decreased dosing of omeprazole  Advised restarting to daily, eat small frequent meals and avoid ulcergenic foods  Diabetes mellitus type II, uncontrolled ([address] )  A1c is stable at 7 8  No hypoglycemia reported on basaglar 34units daily  She is on ACEI, foot exam performed  Diabetes mellitus with microalbuminuria (HCC)  On ACEI      Essential hypertension  She is normotensive on amlodipine 5mg daily, lisinopril 10mg daily and HCTZ 12 5mg daily  Med renewed  Monitor BMP  1  Chronic GERD     2  Diabetes mellitus with microalbuminuria (HCC)  Insulin Pen Needle (BD ULTRA-FINE PEN NEEDLES) 29G X 12 7MM MISC    Hemoglobin A1C    Microalbumin / creatinine urine ratio   3  Uncontrolled type 2 diabetes mellitus with microalbuminuria, without long-term current use of insulin (HCC)  Insulin Pen Needle (BD ULTRA-FINE PEN NEEDLES) 29G X 12 7MM MISC    Hemoglobin A1C    Microalbumin / creatinine urine ratio    Lipid panel   4  Essential hypertension  lisinopril (ZESTRIL) 10 mg tablet    Comprehensive metabolic panel    Lipid panel       Subjective:      Patient ID: Beverly Harris is a 80 y o  female  79yo female with GERD, DM2 with microalbuminuria, HTN, GERD, HLD, RLL lung cancer s/p lobectomy, h/o lymphoma and h/o bladder cancer here for follow up care  She is accompanied by her daughter  Hypertension   This is a chronic problem  The current episode started more than 1 year ago  The problem is controlled  Pertinent negatives include no palpitations  Past treatments include diuretics, ACE inhibitors and calcium channel blockers  Diabetes   She presents for her follow-up diabetic visit  She has type 2 diabetes mellitus  Her disease course has been stable  Symptoms are stable  (FBS<200s)     She has been getting acid reflux and takes TUMS and mylanta with relief  Occurs after eating certain foods    She has been trying to wean down on omeprazole  The following portions of the patient's history were reviewed and updated as appropriate: allergies, current medications, past family history, past medical history, past social history, past surgical history and problem list     Current Outpatient Prescriptions:     acetaminophen (TYLENOL) 325 mg tablet, Take 2 tablets (650 mg total) by mouth every 6 (six) hours as needed for mild pain, headaches or fever  , Disp: 30 tablet, Rfl: 0    amLODIPine (NORVASC) 5 mg tablet, Take 5 mg by mouth daily  , Disp: , Rfl:     BASAGLAR KWIKPEN injection pen 100 units/mL, INJECT 0 34ML (=34 UNITS   TOTAL) UNDER THE SKIN DAILY, Disp: 1200 mL, Rfl: 5    Biotin 5000 MCG CAPS, Take by mouth, Disp: , Rfl:     calcium citrate-vitamin D (CITRACAL+D) 315-200 MG-UNIT per tablet, Take 1 tablet by mouth daily  , Disp: , Rfl:     Cyanocobalamin (VITAMIN B-12 PO), Take 1 tablet by mouth , Disp: , Rfl:     hydrochlorothiazide (HYDRODIURIL) 12 5 mg tablet, Take 12 5 mg by mouth daily, Disp: , Rfl:     Insulin Pen Needle (BD ULTRA-FINE PEN NEEDLES) 29G X 12 7MM MISC, by Does not apply route 2 (two) times a day, Disp: 180 each, Rfl: 1    lisinopril (ZESTRIL) 10 mg tablet, Take 1 tablet (10 mg total) by mouth daily, Disp: 90 tablet, Rfl: 1    Multiple Vitamin (MULTIVITAMIN) tablet, Take 1 tablet by mouth daily  , Disp: , Rfl:     omeprazole (PriLOSEC) 20 mg delayed release capsule, Take 20 mg by mouth daily  , Disp: , Rfl:     pravastatin (PRAVACHOL) 20 mg tablet, TAKE 1 TABLET DAILY, Disp: 90 tablet, Rfl: 1    Review of Systems   Constitutional: Negative  HENT: Negative  Respiratory: Negative  Cardiovascular: Positive for leg swelling  Negative for palpitations  Gastrointestinal: Negative for diarrhea and nausea  Acid reflux   Genitourinary: Negative  Neurological: Negative          Objective:    /70 (BP Location: Left arm)   Pulse 80   Temp 98 2 °F (36 8 °C)   Resp 16   Ht 5' 3" (1 6 m)   Wt 76 2 kg (168 lb)   SpO2 94%   BMI 29 76 kg/m²      Physical Exam   Constitutional: She is oriented to person, place, and time  She appears well-developed and well-nourished  No distress  HENT:   Mouth/Throat: Oropharynx is clear and moist    Neck: Neck supple  Cardiovascular: Normal rate, regular rhythm, normal heart sounds and intact distal pulses  Pulses are no weak pulses  Pulses:       Dorsalis pedis pulses are 2+ on the right side, and 2+ on the left side  Posterior tibial pulses are 2+ on the right side, and 2+ on the left side  Trace L ankle edema   Pulmonary/Chest: Effort normal and breath sounds normal  No respiratory distress  Abdominal: Soft  Bowel sounds are normal  There is no tenderness  Feet:   Right Foot:   Skin Integrity: Positive for dry skin  Left Foot:   Skin Integrity: Positive for dry skin  Neurological: She is alert and oriented to person, place, and time  Psychiatric: She has a normal mood and affect  Her behavior is normal    Vitals reviewed  Right Foot/Ankle   Right Foot Inspection  Skin Exam: dry skin                          Toe Exam: right toe deformity  Sensory       Monofilament testing: intact  Vascular    The right DP pulse is 2+  The right PT pulse is 2+  Left Foot/Ankle  Left Foot Inspection  Skin Exam: dry skin                         Toe Exam: left toe deformity                   Sensory       Monofilament: intact  Vascular    The left DP pulse is 2+  The left PT pulse is 2+  Assign Risk Category:  Deformity present; No loss of protective sensation; No weak pulses       Risk: 1    Recent Results (from the past 336 hour(s))   HEMOGLOBIN A1C W/ EAG ESTIMATION    Collection Time: 07/05/18  8:37 AM   Result Value Ref Range    Hemoglobin A1C 7 8 (H) 4 2 - 6 3 %     mg/dl   Microalbumin / creatinine urine ratio    Collection Time: 07/05/18  8:37 AM   Result Value Ref Range    Creatinine, Ur 46 4 mg/dL    Microalbum  ,Vergie Ruby 15 3 0 0 - 20 0 mg/L    Microalb Creat Ratio 33 (H) 0 - 30 mg/g creatinine   Comprehensive metabolic panel    Collection Time: 07/05/18  8:37 AM   Result Value Ref Range    Sodium 140 136 - 145 mmol/L    Potassium 4 7 3 5 - 5 3 mmol/L    Chloride 107 100 - 108 mmol/L    CO2 25 21 - 32 mmol/L    Anion Gap 8 4 - 13 mmol/L    BUN 46 (H) 5 - 25 mg/dL    Creatinine 1 40 (H) 0 60 - 1 30 mg/dL    Glucose, Fasting 151 (H) 65 - 99 mg/dL    Calcium 9 2 8 3 - 10 1 mg/dL    AST 15 5 - 45 U/L    ALT 20 12 - 78 U/L    Alkaline Phosphatase 59 46 - 116 U/L    Total Protein 7 8 6 4 - 8 2 g/dL    Albumin 3 5 3 5 - 5 0 g/dL    Total Bilirubin 0 28 0 20 - 1 00 mg/dL    eGFR 34 ml/min/1 73sq m

## 2018-07-09 NOTE — ASSESSMENT & PLAN NOTE
She is normotensive on amlodipine 5mg daily, lisinopril 10mg daily and HCTZ 12 5mg daily  Med renewed  Monitor BMP

## 2018-08-06 DIAGNOSIS — I10 BENIGN ESSENTIAL HTN: Primary | ICD-10-CM

## 2018-08-06 RX ORDER — AMLODIPINE BESYLATE 5 MG/1
5 TABLET ORAL DAILY
Qty: 90 TABLET | Refills: 1 | Status: SHIPPED | OUTPATIENT
Start: 2018-08-06 | End: 2018-09-08 | Stop reason: SDUPTHER

## 2018-09-08 DIAGNOSIS — I10 BENIGN ESSENTIAL HTN: ICD-10-CM

## 2018-09-08 DIAGNOSIS — I10 ESSENTIAL HYPERTENSION: Chronic | ICD-10-CM

## 2018-09-10 RX ORDER — LISINOPRIL 10 MG/1
TABLET ORAL
Qty: 90 TABLET | Refills: 1 | Status: SHIPPED | OUTPATIENT
Start: 2018-09-10 | End: 2019-07-06 | Stop reason: SDUPTHER

## 2018-09-10 RX ORDER — AMLODIPINE BESYLATE 5 MG/1
TABLET ORAL
Qty: 90 TABLET | Refills: 1 | Status: SHIPPED | OUTPATIENT
Start: 2018-09-10 | End: 2019-01-01 | Stop reason: SDUPTHER

## 2018-11-12 ENCOUNTER — APPOINTMENT (OUTPATIENT)
Dept: LAB | Facility: CLINIC | Age: 83
End: 2018-11-12
Payer: MEDICARE

## 2018-11-12 ENCOUNTER — TRANSCRIBE ORDERS (OUTPATIENT)
Dept: ADMINISTRATIVE | Facility: HOSPITAL | Age: 83
End: 2018-11-12

## 2018-11-12 DIAGNOSIS — E11.29 DIABETES MELLITUS WITH MICROALBUMINURIA (HCC): ICD-10-CM

## 2018-11-12 DIAGNOSIS — I10 ESSENTIAL HYPERTENSION: Chronic | ICD-10-CM

## 2018-11-12 DIAGNOSIS — R80.9 DIABETES MELLITUS WITH MICROALBUMINURIA (HCC): ICD-10-CM

## 2018-11-12 DIAGNOSIS — IMO0002 UNCONTROLLED TYPE 2 DIABETES MELLITUS WITH MICROALBUMINURIA, WITHOUT LONG-TERM CURRENT USE OF INSULIN: ICD-10-CM

## 2018-11-12 LAB
ALBUMIN SERPL BCP-MCNC: 3.4 G/DL (ref 3.5–5)
ALP SERPL-CCNC: 70 U/L (ref 46–116)
ALT SERPL W P-5'-P-CCNC: 49 U/L (ref 12–78)
ANION GAP SERPL CALCULATED.3IONS-SCNC: 5 MMOL/L (ref 4–13)
AST SERPL W P-5'-P-CCNC: 32 U/L (ref 5–45)
BILIRUB SERPL-MCNC: 0.51 MG/DL (ref 0.2–1)
BUN SERPL-MCNC: 31 MG/DL (ref 5–25)
CALCIUM SERPL-MCNC: 9 MG/DL (ref 8.3–10.1)
CHLORIDE SERPL-SCNC: 104 MMOL/L (ref 100–108)
CHOLEST SERPL-MCNC: 152 MG/DL (ref 50–200)
CO2 SERPL-SCNC: 28 MMOL/L (ref 21–32)
CREAT SERPL-MCNC: 1.35 MG/DL (ref 0.6–1.3)
CREAT UR-MCNC: 63.3 MG/DL
EST. AVERAGE GLUCOSE BLD GHB EST-MCNC: 171 MG/DL
GFR SERPL CREATININE-BSD FRML MDRD: 35 ML/MIN/1.73SQ M
GLUCOSE P FAST SERPL-MCNC: 103 MG/DL (ref 65–99)
HBA1C MFR BLD: 7.6 % (ref 4.2–6.3)
HDLC SERPL-MCNC: 43 MG/DL (ref 40–60)
LDLC SERPL CALC-MCNC: 48 MG/DL (ref 0–100)
MICROALBUMIN UR-MCNC: 7.6 MG/L (ref 0–20)
MICROALBUMIN/CREAT 24H UR: 12 MG/G CREATININE (ref 0–30)
NONHDLC SERPL-MCNC: 109 MG/DL
POTASSIUM SERPL-SCNC: 4.9 MMOL/L (ref 3.5–5.3)
PROT SERPL-MCNC: 7.6 G/DL (ref 6.4–8.2)
SODIUM SERPL-SCNC: 137 MMOL/L (ref 136–145)
TRIGL SERPL-MCNC: 303 MG/DL

## 2018-11-12 PROCEDURE — 80061 LIPID PANEL: CPT

## 2018-11-12 PROCEDURE — 80053 COMPREHEN METABOLIC PANEL: CPT

## 2018-11-12 PROCEDURE — 36415 COLL VENOUS BLD VENIPUNCTURE: CPT

## 2018-11-12 PROCEDURE — 82043 UR ALBUMIN QUANTITATIVE: CPT

## 2018-11-12 PROCEDURE — 82570 ASSAY OF URINE CREATININE: CPT

## 2018-11-12 PROCEDURE — 83036 HEMOGLOBIN GLYCOSYLATED A1C: CPT

## 2018-11-16 ENCOUNTER — OFFICE VISIT (OUTPATIENT)
Dept: INTERNAL MEDICINE CLINIC | Facility: CLINIC | Age: 83
End: 2018-11-16
Payer: MEDICARE

## 2018-11-16 VITALS
WEIGHT: 166.8 LBS | OXYGEN SATURATION: 99 % | HEIGHT: 63 IN | HEART RATE: 96 BPM | DIASTOLIC BLOOD PRESSURE: 70 MMHG | RESPIRATION RATE: 16 BRPM | SYSTOLIC BLOOD PRESSURE: 124 MMHG | BODY MASS INDEX: 29.55 KG/M2 | TEMPERATURE: 98.4 F

## 2018-11-16 DIAGNOSIS — Z00.00 MEDICARE ANNUAL WELLNESS VISIT, SUBSEQUENT: ICD-10-CM

## 2018-11-16 DIAGNOSIS — I10 ESSENTIAL HYPERTENSION: Chronic | ICD-10-CM

## 2018-11-16 DIAGNOSIS — L98.9 SKIN LESION OF FACE: ICD-10-CM

## 2018-11-16 DIAGNOSIS — E78.2 MIXED HYPERLIPIDEMIA: ICD-10-CM

## 2018-11-16 DIAGNOSIS — R80.9 DIABETES MELLITUS WITH MICROALBUMINURIA (HCC): Primary | ICD-10-CM

## 2018-11-16 DIAGNOSIS — E11.29 DIABETES MELLITUS WITH MICROALBUMINURIA (HCC): Primary | ICD-10-CM

## 2018-11-16 DIAGNOSIS — Z78.0 POSTMENOPAUSAL: ICD-10-CM

## 2018-11-16 PROCEDURE — 99214 OFFICE O/P EST MOD 30 MIN: CPT | Performed by: INTERNAL MEDICINE

## 2018-11-16 PROCEDURE — G0439 PPPS, SUBSEQ VISIT: HCPCS | Performed by: INTERNAL MEDICINE

## 2018-11-16 RX ORDER — HYDROCHLOROTHIAZIDE 12.5 MG/1
12.5 TABLET ORAL DAILY
Qty: 90 TABLET | Refills: 1 | Status: SHIPPED | OUTPATIENT
Start: 2018-11-16 | End: 2018-11-26 | Stop reason: SDUPTHER

## 2018-11-16 RX ORDER — INFLUENZA A VIRUSA/MICHIGAN/45/2015 X-275 (H1N1) ANTIGEN (FORMALDEHYDE INACTIVATED), INFLUENZA A VIRUS A/HONG KONG/4801/2014 X-263B (H3N2) ANTIGEN (FORMALDEHYDE INACTIVATED), AND INFLUENZA B VIRUS B/BRISBANE/60/2008 ANTIGEN (FORMALDEHYDE INACTIVATED) 60; 60; 60 UG/.5ML; UG/.5ML; UG/.5ML
INJECTION, SUSPENSION INTRAMUSCULAR
Refills: 0 | COMMUNITY
Start: 2018-09-06 | End: 2019-03-22 | Stop reason: ALTCHOICE

## 2018-11-16 NOTE — PATIENT INSTRUCTIONS
Obesity   AMBULATORY CARE:   Obesity  is when your body mass index (BMI) is greater than 30  Your healthcare provider will use your height and weight to measure your BMI  The risks of obesity include  many health problems, such as injuries or physical disability  You may need tests to check for the following:  · Diabetes     · High blood pressure or high cholesterol     · Heart disease     · Gallbladder or liver disease     · Cancer of the colon, breast, prostate, liver, or kidney     · Sleep apnea     · Arthritis or gout  Seek care immediately if:   · You have a severe headache, confusion, or difficulty speaking  · You have weakness on one side of your body  · You have chest pain, sweating, or shortness of breath  Contact your healthcare provider if:   · You have symptoms of gallbladder or liver disease, such as pain in your upper abdomen  · You have knee or hip pain and discomfort while walking  · You have symptoms of diabetes, such as intense hunger and thirst, and frequent urination  · You have symptoms of sleep apnea, such as snoring or daytime sleepiness  · You have questions or concerns about your condition or care  Treatment for obesity  focuses on helping you lose weight to improve your health  Even a small decrease in BMI can reduce the risk for many health problems  Your healthcare provider will help you set a weight-loss goal   · Lifestyle changes  are the first step in treating obesity  These include making healthy food choices and getting regular physical activity  Your healthcare provider may suggest a weight-loss program that involves coaching, education, and therapy  · Medicine  may help you lose weight when it is used with a healthy diet and physical activity  · Surgery  can help you lose weight if you are very obese and have other health problems  There are several types of weight-loss surgery  Ask your healthcare provider for more information    Be successful losing weight:   · Set small, realistic goals  An example of a small goal is to walk for 20 minutes 5 days a week  Anther goal is to lose 5% of your body weight  · Tell friends, family members, and coworkers about your goals  and ask for their support  Ask a friend to lose weight with you, or join a weight-loss support group  · Identify foods or triggers that may cause you to overeat , and find ways to avoid them  Remove tempting high-calorie foods from your home and workplace  Place a bowl of fresh fruit on your kitchen counter  If stress causes you to eat, then find other ways to cope with stress  · Keep a diary to track what you eat and drink  Also write down how many minutes of physical activity you do each day  Weigh yourself once a week and record it in your diary  Eating changes: You will need to eat 500 to 1,000 fewer calories each day than you currently eat to lose 1 to 2 pounds a week  The following changes will help you cut calories:  · Eat smaller portions  Use small plates, no larger than 9 inches in diameter  Fill your plate half full of fruits and vegetables  Measure your food using measuring cups until you know what a serving size looks like  · Eat 3 meals and 1 or 2 snacks each day  Plan your meals in advance  [facility] and eat at home most of the time  Eat slowly  · Eat fruits and vegetables at every meal   They are low in calories and high in fiber, which makes you feel full  Do not add butter, margarine, or cream sauce to vegetables  Use herbs to season steamed vegetables  · Eat less fat and fewer fried foods  Eat more baked or grilled chicken and fish  These protein sources are lower in calories and fat than red meat  Limit fast food  Dress your salads with olive oil and vinegar instead of bottled dressing  · Limit the amount of sugar you eat  Do not drink sugary beverages  Limit alcohol  Activity changes:  Physical activity is good for your body in many ways   It helps you burn calories and build strong muscles  It decreases stress and depression, and improves your mood  It can also help you sleep better  Talk to your healthcare provider before you begin an exercise program   · Exercise for at least 30 minutes 5 days a week  Start slowly  Set aside time each day for physical activity that you enjoy and that is convenient for you  It is best to do both weight training and an activity that increases your heart rate, such as walking, bicycling, or swimming  · Find ways to be more active  Do yard work and housecleaning  Walk up the stairs instead of using elevators  Spend your leisure time going to events that require walking, such as outdoor festivals or fairs  This extra physical activity can help you lose weight and keep it off  Follow up with your healthcare provider as directed: You may need to meet with a dietitian  Write down your questions so you remember to ask them during your visits  © 2017 [address] Information is for End User's use only and may not be sold, redistributed or otherwise used for commercial purposes  All illustrations and images included in CareNotes® are the copyrighted property of [employer]  or Aaron Rubio  The above information is an  only  It is not intended as medical advice for individual conditions or treatments  Talk to your doctor, nurse or pharmacist before following any medical regimen to see if it is safe and effective for you  Urinary Incontinence   WHAT YOU NEED TO KNOW:   What is urinary incontinence? Urinary incontinence (UI) is when you lose control of your bladder  What causes UI? UI occurs because your bladder cannot store or empty urine properly  The following are the most common types of UI:  · Stress incontinence  is when you leak urine due to increased bladder pressure  This may happen when you cough, sneeze, or exercise       · Urge incontinence  is when you feel the need to urinate right away and leak urine accidentally  · Mixed incontinence  is when you have both stress and urge UI  What are the signs and symptoms of UI?   · You feel like your bladder does not empty completely when you urinate  · You urinate often and need to urinate immediately  · You leak urine when you sleep, or you wake up with the urge to urinate  · You leak urine when you cough, sneeze, exercise, or laugh  How is UI diagnosed? Your healthcare provider will ask how often you leak urine and whether you have stress or urge symptoms  Tell him which medicines you take, how often you urinate, and how much liquid you drink each day  You may need any of the following tests:  · Urine tests  may show infection or kidney function  · A pelvic exam  may be done to check for blockages  A pelvic exam will also show if your bladder, uterus, or other organs have moved out of place  · An x-ray, ultrasound, or CT  may show problems with parts of your urinary system  You may be given contrast liquid to help your organs show up better in the pictures  Tell the healthcare provider if you have ever had an allergic reaction to contrast liquid  Do not enter the MRI room with anything metal  Metal can cause serious injury  Tell the healthcare provider if you have any metal in or on your body  · A bladder scan  will show how much urine is left in your bladder after you urinate  You will be asked to urinate and then healthcare providers will use a small ultrasound machine to check the urine left in your bladder  · Cystometry  is used to check the function of your urinary system  Your healthcare provider checks the pressure in your bladder while filling it with fluid  Your bladder pressure may also be tested when your bladder is full and while you urinate  How is UI treated? · Medicines  can help strengthen your bladder control      · Electrical stimulation  is used to send a small amount of electrical energy to your pelvic floor muscles  This helps control your bladder function  Electrodes may be placed outside your body or in your rectum  For women, the electrodes may be placed in the vagina  · A bulking agent  may be injected into the wall of your urethra to make it thicker  This helps keep your urethra closed and decreases urine leakage  · Devices  such as a clamp, pessary, or tampon may help stop urine leaks  Ask your healthcare provider for more information about these and other devices  · Surgery  may be needed if other treatments do not work  Several types of surgery can help improve your bladder control  Ask your healthcare provider for more information about the surgery you may need  How can I manage my symptoms? · Do pelvic muscle exercises often  Your pelvic muscles help you stop urinating  Squeeze these muscles tight for 5 seconds, then relax for 5 seconds  Gradually work up to squeezing for 10 seconds  Do 3 sets of 15 repetitions a day, or as directed  This will help strengthen your pelvic muscles and improve bladder control  · A catheter  may be used to help empty your bladder  A catheter is a tiny, plastic tube that is put into your bladder to drain your urine  Your healthcare provider may tell you to use a catheter to prevent your bladder from getting too full and leaking urine  · Keep a UI record  Write down how often you leak urine and how much you leak  Make a note of what you were doing when you leaked urine  · Train your bladder  Go to the bathroom at set times, such as every 2 hours, even if you do not feel the urge to go  You can also try to hold your urine when you feel the urge to go  For example, hold your urine for 5 minutes when you feel the urge to go  As that becomes easier, hold your urine for 10 minutes  · Drink liquids as directed  Ask your healthcare provider how much liquid to drink each day and which liquids are best for you   You may need to limit the amount of liquid you drink to help control your urine leakage  Limit or do not have drinks that contain caffeine or alcohol  Do not drink any liquid right before you go to bed  · Prevent constipation  Eat a variety of high-fiber foods  Good examples are high-fiber cereals, beans, vegetables, and whole-grain breads  Prune juice may help make your bowel movement softer  Walking is the best way to trigger your intestines to have a bowel movement  · Exercise regularly and maintain a healthy weight  Ask your healthcare provider how much you should weigh and about the best exercise plan for you  Weight loss and exercise will decrease pressure on your bladder and help you control your leakage  Ask him to help you create a weight loss plan if you are overweight  When should I seek immediate care? · You have severe pain  · You are confused or cannot think clearly  When should I contact my healthcare provider? · You have a fever  · You see blood in your urine  · You have pain when you urinate  · You have new or worse pain, even after treatment  · Your mouth feels dry or you have vision changes  · Your urine is cloudy or smells bad  · You have questions or concerns about your condition or care  CARE AGREEMENT:   You have the right to help plan your care  Learn about your health condition and how it may be treated  Discuss treatment options with your caregivers to decide what care you want to receive  You always have the right to refuse treatment  The above information is an  only  It is not intended as medical advice for individual conditions or treatments  Talk to your doctor, nurse or pharmacist before following any medical regimen to see if it is safe and effective for you  © 2017 [address] Information is for End User's use only and may not be sold, redistributed or otherwise used for commercial purposes   All illustrations and images included in CareNotes® are the copyrighted property of A [employer] A M , Inc  or Aaron Rubio  Cigarette Smoking and Your Health   AMBULATORY CARE:   Risks to your health if you smoke:  Nicotine and other chemicals found in tobacco damage every cell in your body  Even if you are a light smoker, you have an increased risk for cancer, heart disease, and lung disease  If you are pregnant or have diabetes, smoking increases your risk for complications  Benefits to your health if you stop smoking:   · You decrease respiratory symptoms such as coughing, wheezing, and shortness of breath  · You reduce your risk for cancers of the lung, mouth, throat, kidney, bladder, pancreas, stomach, and cervix  If you already have cancer, you increase the benefits of chemotherapy  You also reduce your risk for cancer returning or a second cancer from developing  · You reduce your risk for heart disease, blood clots, heart attack, and stroke  · You reduce your risk for lung infections, and diseases such as pneumonia, asthma, chronic bronchitis, and emphysema  · Your circulation improves  More oxygen can be delivered to your body  If you have diabetes, you lower your risk for complications, such as kidney, artery, and eye diseases  You also lower your risk for nerve damage  Nerve damage can lead to amputations, poor vision, and blindness  · You improve your body's ability to heal and to fight infections  Benefits to the health of others if you stop smoking:  Tobacco is harmful to nonsmokers who breathe in your secondhand smoke  The following are ways the health of others around you may improve when you stop smoking:  · You lower the risks for lung cancer and heart disease in nonsmoking adults  · If you are pregnant, you lower the risk for miscarriage, early delivery, low birth weight, and stillbirth  You also lower your baby's risk for SIDS, obesity, developmental delay, and neurobehavioral problems, such as ADHD  · If you have children, you lower their risk for ear infections, colds, pneumonia, bronchitis, and asthma  For more information and support to stop smoking:   · [employer]  Phone: 4- 936 - 064-0634  Web Address: www smokefree  gov  Follow up with your healthcare provider as directed:  Write down your questions so you remember to ask them during your visits  © 2017 [facility] Information is for End User's use only and may not be sold, redistributed or otherwise used for commercial purposes  All illustrations and images included in CareNotes® are the copyrighted property of [employer] M , Inc  or Aaron Rubio  The above information is an  only  It is not intended as medical advice for individual conditions or treatments  Talk to your doctor, nurse or pharmacist before following any medical regimen to see if it is safe and effective for you  Fall Prevention   AMBULATORY CARE:   Fall prevention  includes ways to make your home and other areas safer  It also includes ways you can move more carefully to prevent a fall  Health conditions that cause changes in your blood pressure, vision, or muscle strength and coordination may increase your risk for falls  Medicines may also increase your risk for falls if they make you dizzy, weak, or sleepy  Call 911 or have someone else call if:   · You have fallen and are unconscious  · You have fallen and cannot move part of your body  Contact your healthcare provider if:   · You have fallen and have pain or a headache  · You have questions or concerns about your condition or care  Fall prevention tips:   · Stand or sit up slowly  This may help you keep your balance and prevent falls  · Use assistive devices as directed  Your healthcare provider may suggest that you use a cane or walker to help you keep your balance  You may need to have grab bars put in your bathroom near the toilet or in the shower      · Wear shoes that fit well and have soles that   Wear shoes both inside and outside  Use slippers with good   Do not wear shoes with high heels  · Wear a personal alarm  This is a device that allows you to call 911 if you fall and need help  Ask your healthcare provider for more information  · Stay active  Exercise can help strengthen your muscles and improve your balance  Your healthcare provider may recommend water aerobics or walking  He or she may also recommend physical therapy to improve your coordination  Never start an exercise program without talking to your healthcare provider first      · Manage your medical conditions  Keep all appointments with your healthcare providers  Visit your eye doctor as directed  Home safety tips:   · Add items to prevent falls in the bathroom  Put nonslip strips on your bath or shower floor to prevent you from slipping  Use a bath mat if you do not have carpet in the bathroom  This will prevent you from falling when you step out of the bath or shower  Use a shower seat so you do not need to stand while you shower  Sit on the toilet or a chair in your bathroom to dry yourself and put on clothing  This will prevent you from losing your balance from drying or dressing yourself while you are standing  · Keep paths clear  Remove books, shoes, and other objects from walkways and stairs  Place cords for telephones and lamps out of the way so that you do not need to walk over them  Tape them down if you cannot move them  Remove small rugs  If you cannot remove a rug, secure it with double-sided tape  This will prevent you from tripping  · Install bright lights in your home  Use night lights to help light paths to the bathroom or kitchen  Always turn on the light before you start walking  · Keep items you use often on shelves within reach  Do not use a step stool to help you reach an item  · Paint or place reflective tape on the edges of your stairs    This will help you see the stairs better  Follow up with your healthcare provider as directed:  Write down your questions so you remember to ask them during your visits  © 2017 [address] Information is for End User's use only and may not be sold, redistributed or otherwise used for commercial purposes  All illustrations and images included in CareNotes® are the copyrighted property of [employer] M , Inc  or Aaron Rubio  The above information is an  only  It is not intended as medical advice for individual conditions or treatments  Talk to your doctor, nurse or pharmacist before following any medical regimen to see if it is safe and effective for you  Advance Directives   WHAT YOU NEED TO KNOW:   What are advance directives? Advance directives are legal documents that state your wishes and plans for medical care  These plans are made ahead of time in case you lose your ability to make decisions for yourself  Advance directives can apply to any medical decision, such as the treatments you want, and if you want to donate organs  What are the types of advance directives? There are many types of advance directives, and each state has rules about how to use them  You may choose a combination of any of the following:  · Living will: This is a written record of the treatment you want  You can also choose which treatments you do not want, which to limit, and which to stop at a certain time  This includes surgery, medicine, IV fluid, and tube feedings  · Durable power of  for healthcare [facility]): This is a written record that states who you want to make healthcare choices for you when you are unable to make them for yourself  This person, called a proxy, is usually a family member or a friend  You may choose more than 1 proxy  · Do not resuscitate (DNR) order:  A DNR order is used in case your heart stops beating or you stop breathing   It is a request not to have certain forms of treatment, such as CPR  A DNR order may be included in other types of advance directives  · Medical directive: This covers the care that you want if you are in a coma, near death, or unable to make decisions for yourself  You can list the treatments you want for each condition  Treatment may include pain medicine, surgery, blood transfusions, dialysis, IV or tube feedings, and a ventilator (breathing machine)  · Values history: This document has questions about your views, beliefs, and how you feel and think about life  This information can help others choose the care that you would choose  Why are advance directives important? An advance directive helps you control your care  Although spoken wishes may be used, it is better to have your wishes written down  Spoken wishes can be misunderstood, or not followed  Treatments may be given even if you do not want them  An advance directive may make it easier for your family to make difficult choices about your care  How do I decide what to put in my advance directives? · Make informed decisions:  Make sure you fully understand treatments or care you may receive  Think about the benefits and problems your decisions could cause for you or your family  Talk to healthcare providers if you have concerns or questions before you write down your wishes  You may also want to talk with your [employer] or , or a   Check your state laws to make sure that what you put in your advance directive is legal      · Sign all forms:  Sign and date your advance directive when you have finished  You may also need 2 witnesses to sign the forms  Witnesses cannot be your doctor or his staff, your spouse, heirs or beneficiaries, people you owe money to, or your chosen proxy  Talk to your family, proxy, and healthcare providers about your advance directive  Give each person a copy, and keep one for yourself in a place you can get to easily   Do not keep it hidden or locked away  · Review and revise your plans: You can revise your advance directive at any time, as long as you are able to make decisions  Review your plan every year, and when there are changes in your life, or your health  When you make changes, let your family, proxy, and healthcare providers know  Give each a new copy  Where can I find more information? · [employer]  [address]uan [address] , [place] [address]  Phone: 7- [phone]  Phone: 4- [phone]  Web Address: http://www  aafp org  · [address] [facility])  [address] [address] [facility], [address] [facility] , [address] [place] [address]  Phone: [phone]  Phone: [phone]  Web Address: Ian finney  CARE AGREEMENT:   You have the right to help plan your care  To help with this plan, you must learn about your health condition and treatment options  You must also learn about advance directives and how they are used  Work with your healthcare providers to decide what care will be used to treat you  You always have the right to refuse treatment  The above information is an  only  It is not intended as medical advice for individual conditions or treatments  Talk to your doctor, nurse or pharmacist before following any medical regimen to see if it is safe and effective for you  © 2017 [address]  Information is for End User's use only and may not be sold, redistributed or otherwise used for commercial purposes  All illustrations and images included in CareNotes® are the copyrighted property of [employer]  or Aaron Rubio  Thank you for enrolling in [address] [place] [address]  Please follow the instructions below to securely access your online medical record  [employer] allows you to send messages to your doctor, view your test results, renew your prescriptions, schedule appointments, and more      [address] uses Single Sign on (SSO) Technology for you to log in and access our [facility]  [place], including [employer]  No more remembering multiple user names and passwords! We are going to guide you through, step by step, to help you set up your Terrance Perkins account which will provide access to your MyChart account  How Do I Sign Up? 1  In your Internet browser, go to [email or web address]       2  Click on the [facility] patient account and then click Dont have an                 Account? Create one now      3  Enter your demographic information and chose a user name (email address) and password  Think of one that is secure and easy to remember  Enter a Referral code if you have one (this is not your MyChart code ) Accept the Terms and Conditions and the Privacy Policy  4  Select your security questions that you will use to reset your password should you forget it  Click Submit  5  Enter your MyChart Activation Code exactly as it appears below  You will not need to use this code after you have completed the sign-up process  If you do not sign up before the expiration date, you must request a new code  [facility] Activation Code: [email or web address]  Expires: 11/30/2018 10:43 AM    6  Confirm your email address  An email confirmation was sent to you  Please open that email and click Confirm your Email   You should then be redirected to our Terrance [facility] Single sign on page, where you will log on with the user name and password you created! Proceed to the [facility] Icon to view your personal health information  Additional Information  If you have questions, you can e-mail patient  [email or web address] or call [phone] to talk to our customer support staff  Remember, [facility] is NOT to be used for urgent needs  For medical emergencies, dial 911

## 2018-11-16 NOTE — ASSESSMENT & PLAN NOTE
Benign, stable and controlled on hctz 12 5mg daily, amlodipine 5mg daily and lisinopril l10mg daily  Med renewed  Monitor bmp

## 2018-11-16 NOTE — ASSESSMENT & PLAN NOTE
A1c is improving, now at 7 6 without hypoglycemic events on Basaglar 35units daily  Will continue same treatment  On ACEI for microalbuminuria  Foot exam performed  Pt reminded to go for diabetic eye exams

## 2018-11-16 NOTE — PROGRESS NOTES
Assessment and Plan:    Problem List Items Addressed This Visit     None        Health Maintenance Due   Topic Date Due    Pneumococcal PPSV23/PCV13 65+ Years / High and Highest Risk (2 of 2 - PPSV23) 01/26/2015    DM Eye Exam  10/19/2016     HPI:  Darwin Chery is a 80 y o  female here for her Subsequent Wellness Visit      Patient Active Problem List   Diagnosis    Closed 2-part nondisplaced fracture of surgical neck of left humerus    [facility], controlled [facility] [address] [facility]    Essential hypertension    Left knee pain    Squamous cell lung cancer, right (HCC)    Abnormal thyroid function test    Adenocarcinoma of lung, right (HCC)    Anemia    Chronic GERD    Diabetes mellitus type II, uncontrolled [facility] [address] [facility]    Diabetes mellitus with microalbuminuria (HCC)    Diffuse large B cell lymphoma ([facility] [address] [facility]    Glaucoma    Hyperlipidemia     Past Medical History:   Diagnosis Date    Bladder cancer ([facility] [address] [facility]     Diabetes mellitus ([facility] [address] [facility]     GERD without esophagitis     Hyperlipidemia     last assessed 4/12/13    Hypertension     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[facility] [address] [facility]     last assessed  7/12/13    Liver cancer ([facility] [address] [facility]     Lung cancer ([facility] [address] [facility]     Lymphoma ([facility] [address] [facility]     Non Hodgkin's lymphoma ([facility] [address] )     last assessed 1/20/14    Osteoporosis     last assessed  4/12/13    Right cataract     last assessed  3/2/15     Past Surgical History:   Procedure Laterality Date    CATARACT EXTRACTION      LIVER LOBECTOMY      LUNG REMOVAL, PARTIAL Right     lobectomy - onset approx 3/12/12 - right lower lobe     Family History   Problem Relation Age of Onset    Brain cancer Mother     Other Mother         adenocarcinoma of the accessor sinuses    Diabetes Father     Leukemia Father     Cirrhosis Sister         Alcoholic's cirrhosis - Laennec's     History   Smoking Status    Former Smoker    Packs/day: 2 00    Years: 40 00    Quit date: 1992   Smokeless Tobacco    Never Used     Comment: 60 pack year hx, Quit 15 yrs ago 1998 as per [employer]     History   Alcohol Use No      History   Drug Use No       Current Outpatient Prescriptions   Medication Sig Dispense Refill    acetaminophen (TYLENOL) 325 mg tablet Take 2 tablets (650 mg total) by mouth every 6 (six) hours as needed for mild pain, headaches or fever  30 tablet 0    amLODIPine (NORVASC) 5 mg tablet TAKE 1 TABLET ONCE DAILY 90 tablet 1    BASAGLAR KWIKPEN injection pen 100 units/mL INJECT 0 34ML (=34 UNITS   TOTAL) UNDER THE SKIN DAILY 1200 mL 5    Biotin 5000 MCG CAPS Take by mouth      calcium citrate-vitamin D (CITRACAL+D) 315-200 MG-UNIT per tablet Take 1 tablet by mouth daily        Cyanocobalamin (VITAMIN B-12 PO) Take 1 tablet by mouth   FLUZONE HIGH-DOSE 0 5 ML PAULINE ADM 0 5ML IM UTD  0    hydrochlorothiazide (HYDRODIURIL) 12 5 mg tablet Take 12 5 mg by mouth daily      Insulin Pen Needle (BD ULTRA-FINE PEN NEEDLES) 29G X 12 7MM MISC by Does not apply route 2 (two) times a day 180 each 1    lisinopril (ZESTRIL) 10 mg tablet TAKE 1 TABLET DAILY 90 tablet 1    Multiple Vitamin (MULTIVITAMIN) tablet Take 1 tablet by mouth daily   omeprazole (PriLOSEC) 20 mg delayed release capsule Take 20 mg by mouth daily   pravastatin (PRAVACHOL) 20 mg tablet TAKE 1 TABLET DAILY 90 tablet 1     No current facility-administered medications for this visit  No Known Allergies  Immunization History   Administered Date(s) Administered    Influenza 09/19/2016, 09/13/2017, 09/06/2018    Influenza Split High Dose Preservative Free IM 09/01/2013, 10/06/2014, 10/05/2015, 09/19/2016    Influenza TIV (IM) 09/13/2017    Pneumococcal Conjugate 13-Valent 12/01/2014    Pneumococcal Polysaccharide PPV23 [birth year]31    Tdap 12/01/2014       Patient Care Team:  Afsaneh Segundo DO as PCP - MD Laura Lewis MD Aviva Marry, Orville Montoya MD    Medicare Screening Tests and Risk Assessments:  Rayne Cortez is here for her Subsequent Wellness visit    Last Medicare Wellness visit information reviewed, patient interviewed and updates made to the record today  Health Risk Assessment:  Patient rates overall health as good  Patient feels that their physical health rating is Slightly better  Eyesight was rated as Slightly worse  Hearing was rated as Same  Patient feels that their emotional and mental health rating is Same  Pain experienced by patient in the last 7 days has been None  Patient states that she has experienced no weight loss or gain in last 6 months  Emotional/Mental Health:  Patient has been feeling nervous/anxious  PHQ-9 Depression Screening:    Frequency of the following problems over the past two weeks:      1  Little interest or pleasure in doing things: 0 - not at all      2  Feeling down, depressed, or hopeless: 0 - not at all  PHQ-2 Score: 0          Broken Bones/Falls: Fall Risk Assessment:    In the past year, patient has experienced: No history of falling in past year          Bladder/Bowel:  Patient has not leaked urine accidently in the last six months  Patient reports no loss of bowel control  Immunizations:  Patient has had a flu vaccination within the last year  Patient has received a pneumonia shot  Patient has not received a shingles shot  Home Safety:  Patient has trouble with stairs inside or outside of their home  Patient currently reports that there are no safety hazards present in home, working smoke alarms, working carbon monoxide detectors  (Additional Comments: Has stairlift)    Preventative Screenings:   No breast cancer screening performed, no colon cancer screen completed, cholesterol screen completed, glaucoma eye exam completed,     Nutrition:  Current diet: Diabetic and No Added Salt with servings of the following:    Medications:  Patient is currently taking over-the-counter supplements  Patient is not able to manage medications  Lifestyle Choices:  Patient reports no tobacco use    Patient has smoked or used tobacco in the past   Patient has stopped her tobacco use  Tobacco use quit date: 01/01/1995  Patient reports no alcohol use  Patient does not drive a vehicle  Patient wears seat belt  Activities of Daily Living:  Can get out of bed by his or her self, able to dress self, able to make own meals, able to do own shopping, able to bathe self, can do own laundry/housekeeping, can manage own money, pay bills and track expenses    Previous Hospitalizations:  No hospitalization or ED visit in past 12 months        Advanced Directives:  Patient has decided on a power of   Patient has spoken to designated power of   Patient has completed advanced directive  Preventative Screening/Counseling:      Cardiovascular:      General: Screening Current          Diabetes:      General: Screening Not Indicated      Comments: Patient is diabetic        Colorectal Cancer:      General: Screening Not Indicated      Comments: Due to advanced age        Breast Cancer:      General: Screening Not Indicated      Comments: Due to advanced age        Cervical Cancer:      General: Screening Not Indicated      Comments: Due to advanced age        Osteoporosis:      General: Risks and Benefits Discussed      Counseling: Calcium and Vitamin D Intake and Regular Weightbearing Exercise      Due for studies: DXA Axial          AAA:      General: Screening Not Indicated          Glaucoma:      General: Risks and Benefits Discussed      Referrals: Ophthalmology          Hepatitis C:      General: Screening Not Indicated        Advanced Directives:   Patient has living will for healthcare, has durable POA for healthcare, patient has an advanced directive       Immunizations:      Influenza: Influenza UTD This Year and Influenza Recommended Annually      Pneumococcal: Lifetime Vaccine Completed      Shingrix: Risks & Benefits Discussed and Patient Declines      Hepatitis B (Low risk patients): Series Not Indicated      Zostavax: Risks & Benefits Discussed and Patient Declines      TD: Td Vaccine UTD      TDAP: Tdap Vaccine UTD  Additional Comments: tdap 12/2014    Other Preventative Counseling (Non-Medicare): Fall Prevention, Increase physical activity and Car/seat belt/driving safety reviewed      Referrals:   Additional Comments: None today

## 2018-11-16 NOTE — PROGRESS NOTES
Assessment/Plan:    Diabetes mellitus with microalbuminuria (HCC)  A1c is improving, now at 7 6 without hypoglycemic events on Basaglar 35units daily  Will continue same treatment  On ACEI for microalbuminuria  Foot exam performed  Pt reminded to go for diabetic eye exams  Essential hypertension  Benign, stable and controlled on hctz 12 5mg daily, amlodipine 5mg daily and lisinopril l10mg daily  Med renewed  Monitor bmp  Skin lesion of face  L temple, concerning for BCC vs  SCC  Will refer to Derm, she prefers to go to where her daughter in law goes on [employer]  Hyperlipidemia  LDL controlled, triglycerides are rising  Continue on pravastatin 20mg daily, will monitor  1  Diabetes mellitus with microalbuminuria (HCC)  Hemoglobin A1C    Microalbumin / creatinine urine ratio   2  Skin lesion of face  Ambulatory referral to Dermatology   3  Essential hypertension  hydrochlorothiazide (HYDRODIURIL) 12 5 mg tablet    TSH, 3rd generation with Free T4 reflex    Comprehensive metabolic panel   4  Mixed hyperlipidemia  Lipid panel   5  Medicare annual wellness visit, subsequent     6  Postmenopausal  DXA bone density spine hip and pelvis         Subjective:      Patient ID: Brent Fuentes is a 80 y o  female  81yo female with GERD, DM2 microalbuminuria, HTN, HLD, RLL lung cancer s/p lobectomy, h/o lymphoma and h/o bladder cancer here for follow up care  She just returned from vacation in the [facility] with her family  She took her walker with her, ate very well  She noticed a left temple lesion "a while ago", occasionally pruritic with mild increase in size  Denies bleeding  She has not applied anything to the area  Hypertension   This is a chronic problem  The current episode started more than 1 year ago  The problem is controlled  Pertinent negatives include no chest pain or palpitations  Past treatments include calcium channel blockers, ACE inhibitors and diuretics     Hyperlipidemia This is a chronic problem  The current episode started more than 1 year ago  The problem is uncontrolled  Recent lipid tests were reviewed and are high (triglycerides are elevated)  Exacerbating diseases include diabetes and obesity  Pertinent negatives include no chest pain  Current antihyperlipidemic treatment includes statins  Diabetes   She presents for her follow-up diabetic visit  She has type 2 diabetes mellitus  Her disease course has been stable  There are no hypoglycemic associated symptoms  Pertinent negatives for diabetes include no chest pain  Current diabetic treatment includes oral agent (monotherapy) (basaglar 34units daily)  (-200s) An ACE inhibitor/angiotensin II receptor blocker is being taken  The following portions of the patient's history were reviewed and updated as appropriate: allergies, current medications, past family history, past medical history, past social history, past surgical history and problem list     Current Outpatient Prescriptions:     acetaminophen (TYLENOL) 325 mg tablet, Take 2 tablets (650 mg total) by mouth every 6 (six) hours as needed for mild pain, headaches or fever  , Disp: 30 tablet, Rfl: 0    amLODIPine (NORVASC) 5 mg tablet, TAKE 1 TABLET ONCE DAILY, Disp: 90 tablet, Rfl: 1    BASAGLAR KWIKPEN injection pen 100 units/mL, INJECT 0 34ML (=34 UNITS   TOTAL) UNDER THE SKIN DAILY, Disp: 1200 mL, Rfl: 5    Biotin 5000 MCG CAPS, Take by mouth, Disp: , Rfl:     calcium citrate-vitamin D (CITRACAL+D) 315-200 MG-UNIT per tablet, Take 1 tablet by mouth daily  , Disp: , Rfl:     Cyanocobalamin (VITAMIN B-12 PO), Take 1 tablet by mouth , Disp: , Rfl:     FLUZONE HIGH-DOSE 0 5 ML PAULINE, ADM 0 5ML IM UTD, Disp: , Rfl: 0    hydrochlorothiazide (HYDRODIURIL) 12 5 mg tablet, Take 1 tablet (12 5 mg total) by mouth daily, Disp: 90 tablet, Rfl: 1    Insulin Pen Needle (BD ULTRA-FINE PEN NEEDLES) 29G X 12 7MM MISC, by Does not apply route 2 (two) times a day, Disp: 180 each, Rfl: 1    lisinopril (ZESTRIL) 10 mg tablet, TAKE 1 TABLET DAILY, Disp: 90 tablet, Rfl: 1    Multiple Vitamin (MULTIVITAMIN) tablet, Take 1 tablet by mouth daily  , Disp: , Rfl:     omeprazole (PriLOSEC) 20 mg delayed release capsule, Take 20 mg by mouth daily  , Disp: , Rfl:     pravastatin (PRAVACHOL) 20 mg tablet, TAKE 1 TABLET DAILY, Disp: 90 tablet, Rfl: 1    Review of Systems   Constitutional:        Gradual weight loss   HENT: Negative  Respiratory: Negative  Cardiovascular: Positive for leg swelling  Negative for chest pain and palpitations  Gastrointestinal:        Heartburn   Genitourinary: Negative  Musculoskeletal: Positive for back pain  Skin: Positive for color change  Neurological: Negative  Objective:    /70   Pulse 96   Temp 98 4 °F (36 9 °C)   Resp 16   Ht 5' 3" (1 6 m)   Wt 75 7 kg (166 lb 12 8 oz)   SpO2 99%   BMI 29 55 kg/m²      Physical Exam   Constitutional: She is oriented to person, place, and time  She appears well-developed and well-nourished  No distress  HENT:   Mouth/Throat: Oropharynx is clear and moist  No oropharyngeal exudate  Eyes: EOM are normal    Neck: Neck supple  No thyromegaly present  Cardiovascular: Normal rate, regular rhythm, normal heart sounds and intact distal pulses  Pulses are no weak pulses  Pulses:       Dorsalis pedis pulses are 2+ on the right side, and 2+ on the left side  Posterior tibial pulses are 2+ on the right side, and 2+ on the left side  Trace L>R ankle edema   Pulmonary/Chest: Effort normal and breath sounds normal  No respiratory distress  She has no wheezes  Abdominal: Soft  Bowel sounds are normal  She exhibits no distension  There is no tenderness  Musculoskeletal:        Feet:    Feet:   Right Foot:   Skin Integrity: Positive for dry skin  Left Foot:   Skin Integrity: Positive for dry skin  Neurological: She is alert and oriented to person, place, and time     Skin: Skin is dry  L temple scaly, irregular lesion approx 4mm   Psychiatric: She has a normal mood and affect  Her behavior is normal    Vitals reviewed  Patient's shoes and socks removed  Right Foot/Ankle   Right Foot Inspection  Skin Exam: dry skin                          Toe Exam: right toe deformity  Sensory       Monofilament testing: intact  Vascular    The right DP pulse is 2+  The right PT pulse is 2+  Left Foot/Ankle  Left Foot Inspection  Skin Exam: dry skin                         Toe Exam: ROM and strength within normal limits                   Sensory       Monofilament: intact  Vascular    The left DP pulse is 2+  The left PT pulse is 2+  Assign Risk Category:  Deformity present; No loss of protective sensation; No weak pulses       Risk: 1      Recent Results (from the past 504 hour(s))   Hemoglobin A1C    Collection Time: 11/12/18  8:55 AM   Result Value Ref Range    Hemoglobin A1C 7 6 (H) 4 2 - 6 3 %     mg/dl   Microalbumin / creatinine urine ratio    Collection Time: 11/12/18  8:55 AM   Result Value Ref Range    Creatinine, Ur 63 3 mg/dL    Microalbum  ,U,Random 7 6 0 0 - 20 0 mg/L    Microalb Creat Ratio 12 0 - 30 mg/g creatinine   Comprehensive metabolic panel    Collection Time: 11/12/18  8:55 AM   Result Value Ref Range    Sodium 137 136 - 145 mmol/L    Potassium 4 9 3 5 - 5 3 mmol/L    Chloride 104 100 - 108 mmol/L    CO2 28 21 - 32 mmol/L    ANION GAP 5 4 - 13 mmol/L    BUN 31 (H) 5 - 25 mg/dL    Creatinine 1 35 (H) 0 60 - 1 30 mg/dL    Glucose, Fasting 103 (H) 65 - 99 mg/dL    Calcium 9 0 8 3 - 10 1 mg/dL    AST 32 5 - 45 U/L    ALT 49 12 - 78 U/L    Alkaline Phosphatase 70 46 - 116 U/L    Total Protein 7 6 6 4 - 8 2 g/dL    Albumin 3 4 (L) 3 5 - 5 0 g/dL    Total Bilirubin 0 51 0 20 - 1 00 mg/dL    eGFR 35 ml/min/1 73sq m   Lipid panel    Collection Time: 11/12/18  8:55 AM   Result Value Ref Range    Cholesterol 152 50 - 200 mg/dL    Triglycerides 303 (H) <=150 mg/dL    HDL, Direct 43 40 - 60 mg/dL    LDL Calculated 48 0 - 100 mg/dL    Non-HDL-Chol (CHOL-HDL) 109 mg/dl

## 2018-11-16 NOTE — ASSESSMENT & PLAN NOTE
CELE tran, concerning for BCC vs  SCC  Will refer to Derm, she prefers to go to where her daughter in law goes on [employer]

## 2018-11-26 DIAGNOSIS — I10 ESSENTIAL HYPERTENSION: Chronic | ICD-10-CM

## 2018-11-26 RX ORDER — HYDROCHLOROTHIAZIDE 12.5 MG/1
12.5 TABLET ORAL DAILY
Qty: 90 TABLET | Refills: 0 | Status: SHIPPED | OUTPATIENT
Start: 2018-11-26 | End: 2019-03-11 | Stop reason: SDUPTHER

## 2018-12-03 ENCOUNTER — TELEPHONE (OUTPATIENT)
Dept: INTERNAL MEDICINE CLINIC | Facility: CLINIC | Age: 83
End: 2018-12-03

## 2018-12-03 NOTE — TELEPHONE ENCOUNTER
Patient is concerned about her taking her Amlodipine, she heard on the news that it is causing cancer she would like to speak to you about this

## 2018-12-10 DIAGNOSIS — E78.2 MIXED HYPERLIPIDEMIA: ICD-10-CM

## 2018-12-10 RX ORDER — PRAVASTATIN SODIUM 20 MG
TABLET ORAL
Qty: 90 TABLET | Refills: 1 | Status: SHIPPED | OUTPATIENT
Start: 2018-12-10 | End: 2019-06-16 | Stop reason: SDUPTHER

## 2019-01-01 DIAGNOSIS — I10 BENIGN ESSENTIAL HTN: ICD-10-CM

## 2019-01-02 RX ORDER — AMLODIPINE BESYLATE 5 MG/1
TABLET ORAL
Qty: 90 TABLET | Refills: 1 | Status: SHIPPED | OUTPATIENT
Start: 2019-01-02 | End: 2019-10-20 | Stop reason: SDUPTHER

## 2019-01-03 LAB
LEFT EYE DIABETIC RETINOPATHY: NORMAL
RIGHT EYE DIABETIC RETINOPATHY: NORMAL

## 2019-02-22 ENCOUNTER — APPOINTMENT (OUTPATIENT)
Dept: LAB | Facility: CLINIC | Age: 84
End: 2019-02-22
Payer: MEDICARE

## 2019-02-22 DIAGNOSIS — E11.29 DIABETES MELLITUS WITH MICROALBUMINURIA (HCC): ICD-10-CM

## 2019-02-22 DIAGNOSIS — R80.9 DIABETES MELLITUS WITH MICROALBUMINURIA (HCC): ICD-10-CM

## 2019-02-22 DIAGNOSIS — E78.2 MIXED HYPERLIPIDEMIA: ICD-10-CM

## 2019-02-22 DIAGNOSIS — I10 ESSENTIAL HYPERTENSION: ICD-10-CM

## 2019-02-22 LAB
ALBUMIN SERPL BCP-MCNC: 3.5 G/DL (ref 3.5–5)
ALP SERPL-CCNC: 70 U/L (ref 46–116)
ALT SERPL W P-5'-P-CCNC: 15 U/L (ref 12–78)
ANION GAP SERPL CALCULATED.3IONS-SCNC: 5 MMOL/L (ref 4–13)
AST SERPL W P-5'-P-CCNC: 15 U/L (ref 5–45)
BILIRUB SERPL-MCNC: 0.31 MG/DL (ref 0.2–1)
BUN SERPL-MCNC: 43 MG/DL (ref 5–25)
CALCIUM SERPL-MCNC: 9.1 MG/DL (ref 8.3–10.1)
CHLORIDE SERPL-SCNC: 107 MMOL/L (ref 100–108)
CHOLEST SERPL-MCNC: 138 MG/DL (ref 50–200)
CO2 SERPL-SCNC: 28 MMOL/L (ref 21–32)
CREAT SERPL-MCNC: 1.61 MG/DL (ref 0.6–1.3)
CREAT UR-MCNC: 85 MG/DL
EST. AVERAGE GLUCOSE BLD GHB EST-MCNC: 171 MG/DL
GFR SERPL CREATININE-BSD FRML MDRD: 29 ML/MIN/1.73SQ M
GLUCOSE P FAST SERPL-MCNC: 107 MG/DL (ref 65–99)
HBA1C MFR BLD: 7.6 % (ref 4.2–6.3)
HDLC SERPL-MCNC: 46 MG/DL (ref 40–60)
LDLC SERPL CALC-MCNC: 63 MG/DL (ref 0–100)
MICROALBUMIN UR-MCNC: 8.6 MG/L (ref 0–20)
MICROALBUMIN/CREAT 24H UR: 10 MG/G CREATININE (ref 0–30)
NONHDLC SERPL-MCNC: 92 MG/DL
POTASSIUM SERPL-SCNC: 5.1 MMOL/L (ref 3.5–5.3)
PROT SERPL-MCNC: 7.7 G/DL (ref 6.4–8.2)
SODIUM SERPL-SCNC: 140 MMOL/L (ref 136–145)
T4 FREE SERPL-MCNC: 1.07 NG/DL (ref 0.76–1.46)
TRIGL SERPL-MCNC: 143 MG/DL
TSH SERPL DL<=0.05 MIU/L-ACNC: 0.28 UIU/ML (ref 0.36–3.74)

## 2019-02-22 PROCEDURE — 36415 COLL VENOUS BLD VENIPUNCTURE: CPT

## 2019-02-22 PROCEDURE — 80061 LIPID PANEL: CPT

## 2019-02-22 PROCEDURE — 83036 HEMOGLOBIN GLYCOSYLATED A1C: CPT

## 2019-02-22 PROCEDURE — 82570 ASSAY OF URINE CREATININE: CPT

## 2019-02-22 PROCEDURE — 84443 ASSAY THYROID STIM HORMONE: CPT

## 2019-02-22 PROCEDURE — 84439 ASSAY OF FREE THYROXINE: CPT

## 2019-02-22 PROCEDURE — 82043 UR ALBUMIN QUANTITATIVE: CPT

## 2019-02-22 PROCEDURE — 80053 COMPREHEN METABOLIC PANEL: CPT

## 2019-02-25 ENCOUNTER — HOSPITAL ENCOUNTER (OUTPATIENT)
Dept: RADIOLOGY | Age: 84
Discharge: HOME | End: 2019-02-25
Payer: MEDICARE

## 2019-02-25 DIAGNOSIS — Z78.0 POSTMENOPAUSAL: ICD-10-CM

## 2019-02-25 PROCEDURE — 77080 DXA BONE DENSITY AXIAL: CPT

## 2019-03-08 ENCOUNTER — TRANSCRIBE ORDERS (OUTPATIENT)
Dept: RADIOLOGY | Facility: HOSPITAL | Age: 84
End: 2019-03-08

## 2019-03-08 ENCOUNTER — HOSPITAL ENCOUNTER (OUTPATIENT)
Dept: RADIOLOGY | Facility: HOSPITAL | Age: 84
Discharge: HOME | End: 2019-03-08
Payer: MEDICARE

## 2019-03-08 DIAGNOSIS — C34.91 SQUAMOUS CELL LUNG CANCER, RIGHT (HCC): ICD-10-CM

## 2019-03-08 PROCEDURE — 71250 CT THORAX DX C-: CPT

## 2019-03-10 NOTE — PROGRESS NOTES
Assessment/Plan:    Diabetes mellitus with microalbuminuria (HCC)  DM2 with microalbuminuria and CKD3 - A1c is stable at 7 6 without hypoglycemic events  Continue on basaglar 34units sc daily, med renewed  She is on ACEI    Essential hypertension  Benign, controlled on HCTZ 12 5mg daily, lisinopril 10mg daily and amlodipine 5mg daily  Monitor BMP    Age-related osteoporosis without current pathological fracture  Postmenopausal, L femoral neck  Obtain vitamin D25OH  If normal, consider bisphosphanate  CKD (chronic kidney disease), stage III (HCC)  Creatinine baseline 1 3-1 4  Current cr is slightly elevated, advised to increase fluid intake  Will monitor  Hyperlipidemia  Triglycerides improved  Continue on pravastatin 20mg daily  Abnormal TSH  Suppressed TSH with normal FT4  Will repeat  1  Diabetes mellitus with microalbuminuria (HCC)  Basic metabolic panel    Hemoglobin A1C    insulin glargine (BASAGLAR KWIKPEN) 100 units/mL injection pen   2  Age-related osteoporosis without current pathological fracture  Vitamin D 25 hydroxy   3  Essential hypertension  Basic metabolic panel   4  Mixed hyperlipidemia     5  CKD (chronic kidney disease), stage III ([address] )     6  Abnormal TSH  TSH, 3rd generation with Free T4 reflex       Subjective:      Patient ID: Mingo Miranda is a 80 y o  female  81yo female with DM2 with microalbuminuria, HTN, HLD, GERD with h/o lymphoma, bladder cancer and RLL lung cancer s/p lobectomy here for follow up care  She is accompanied by her daughter  Hypertension   This is a chronic problem  The current episode started more than 1 year ago  The problem is controlled  Pertinent negatives include no headaches  Past treatments include diuretics, ACE inhibitors and calcium channel blockers  Diabetes   She presents for her follow-up diabetic visit  She has type 2 diabetes mellitus  Her disease course has been stable  There are no hypoglycemic associated symptoms  Pertinent negatives for hypoglycemia include no dizziness or headaches  Current diabetic treatment includes insulin injections  She is following a generally healthy diet  (-170s) An ACE inhibitor/angiotensin II receptor blocker is being taken  Hyperlipidemia   This is a chronic problem  The current episode started more than 1 year ago  The problem is controlled  Recent lipid tests were reviewed and are normal  Current antihyperlipidemic treatment includes statins  Compliance problems include adherence to exercise  Risk factors for coronary artery disease include diabetes mellitus, hypertension, obesity and a sedentary lifestyle  DXA LS showed osteoporosis in L femoral neck Tscore -2 5, L total hip Tscore -0 9, LS Tscore 1 2  She has decreased height, has leg pains and back pain after prolonged standing  The following portions of the patient's history were reviewed and updated as appropriate: allergies, current medications, past family history, past medical history, past social history, past surgical history and problem list     Current Outpatient Medications:     acetaminophen (TYLENOL) 325 mg tablet, Take 2 tablets (650 mg total) by mouth every 6 (six) hours as needed for mild pain, headaches or fever  , Disp: 30 tablet, Rfl: 0    amLODIPine (NORVASC) 5 mg tablet, TAKE 1 TABLET DAILY, Disp: 90 tablet, Rfl: 1    calcium citrate-vitamin D (CITRACAL+D) 315-200 MG-UNIT per tablet, Take 1 tablet by mouth daily  , Disp: , Rfl:     Cyanocobalamin (VITAMIN B-12 PO), Take 1 tablet by mouth , Disp: , Rfl:     FLUZONE HIGH-DOSE 0 5 ML PAULINE, ADM 0 5ML IM UTD, Disp: , Rfl: 0    hydrochlorothiazide (HYDRODIURIL) 12 5 mg tablet, Take 1 tablet (12 5 mg total) by mouth daily, Disp: 90 tablet, Rfl: 0    insulin glargine (BASAGLAR KWIKPEN) 100 units/mL injection pen, Inject 34 Units under the skin daily, Disp: 10 mL, Rfl: 1    Insulin Pen Needle (BD ULTRA-FINE PEN NEEDLES) 29G X 12 7MM MISC, by Does not apply route 2 (two) times a day, Disp: 180 each, Rfl: 1    lisinopril (ZESTRIL) 10 mg tablet, TAKE 1 TABLET DAILY, Disp: 90 tablet, Rfl: 1    Multiple Vitamin (MULTIVITAMIN) tablet, Take 1 tablet by mouth daily  , Disp: , Rfl:     omeprazole (PriLOSEC) 20 mg delayed release capsule, Take 20 mg by mouth daily  , Disp: , Rfl:     pravastatin (PRAVACHOL) 20 mg tablet, TAKE 1 TABLET DAILY, Disp: 90 tablet, Rfl: 1    Travoprost (TRAVATAN OP), Apply to eye, Disp: , Rfl:     Biotin 5000 MCG CAPS, Take by mouth, Disp: , Rfl:       Review of Systems   Constitutional: Negative  Respiratory: Negative  Cardiovascular: Negative  Gastrointestinal: Negative  Musculoskeletal: Positive for back pain and gait problem  Neurological: Negative for dizziness, numbness and headaches  Objective:    /76 (BP Location: Left arm, Patient Position: Sitting)   Pulse 82   Temp 97 8 °F (36 6 °C)   Resp 16   Ht 5' 1" (1 549 m)   Wt 75 8 kg (167 lb 1 6 oz)   SpO2 97%   BMI 31 57 kg/m²      Physical Exam   Constitutional: She appears well-developed and well-nourished  obese   HENT:   Mouth/Throat: Oropharynx is clear and moist    Wears dentures   Cardiovascular: Normal rate, regular rhythm, normal heart sounds and intact distal pulses  Pulmonary/Chest: Effort normal and breath sounds normal  No stridor  No respiratory distress  Musculoskeletal:   Ambulates with a cane   Neurological: She is alert  Psychiatric: She has a normal mood and affect  Vitals reviewed        Recent Results (from the past 504 hour(s))   Hemoglobin A1C    Collection Time: 02/22/19  8:49 AM   Result Value Ref Range    Hemoglobin A1C 7 6 (H) 4 2 - 6 3 %     mg/dl   TSH, 3rd generation with Free T4 reflex    Collection Time: 02/22/19  8:49 AM   Result Value Ref Range    TSH 3RD GENERATON 0 285 (L) 0 358 - 3 740 uIU/mL   Comprehensive metabolic panel    Collection Time: 02/22/19  8:49 AM   Result Value Ref Range    Sodium 140 136 - 145 mmol/L    Potassium 5 1 3 5 - 5 3 mmol/L    Chloride 107 100 - 108 mmol/L    CO2 28 21 - 32 mmol/L    ANION GAP 5 4 - 13 mmol/L    BUN 43 (H) 5 - 25 mg/dL    Creatinine 1 61 (H) 0 60 - 1 30 mg/dL    Glucose, Fasting 107 (H) 65 - 99 mg/dL    Calcium 9 1 8 3 - 10 1 mg/dL    AST 15 5 - 45 U/L    ALT 15 12 - 78 U/L    Alkaline Phosphatase 70 46 - 116 U/L    Total Protein 7 7 6 4 - 8 2 g/dL    Albumin 3 5 3 5 - 5 0 g/dL    Total Bilirubin 0 31 0 20 - 1 00 mg/dL    eGFR 29 ml/min/1 73sq m   Lipid panel    Collection Time: 02/22/19  8:49 AM   Result Value Ref Range    Cholesterol 138 50 - 200 mg/dL    Triglycerides 143 <=150 mg/dL    HDL, Direct 46 40 - 60 mg/dL    LDL Calculated 63 0 - 100 mg/dL    Non-HDL-Chol (CHOL-HDL) 92 mg/dl   Microalbumin / creatinine urine ratio    Collection Time: 02/22/19  8:49 AM   Result Value Ref Range    Creatinine, Ur 85 0 mg/dL    Microalbum  ,U,Random 8 6 0 0 - 20 0 mg/L    Microalb Creat Ratio 10 0 - 30 mg/g creatinine   T4, free    Collection Time: 02/22/19  8:49 AM   Result Value Ref Range    Free T4 1 07 0 76 - 1 46 ng/dL

## 2019-03-11 ENCOUNTER — OFFICE VISIT (OUTPATIENT)
Dept: INTERNAL MEDICINE CLINIC | Facility: CLINIC | Age: 84
End: 2019-03-11
Payer: MEDICARE

## 2019-03-11 VITALS
DIASTOLIC BLOOD PRESSURE: 76 MMHG | BODY MASS INDEX: 31.55 KG/M2 | HEIGHT: 61 IN | OXYGEN SATURATION: 97 % | RESPIRATION RATE: 16 BRPM | SYSTOLIC BLOOD PRESSURE: 112 MMHG | WEIGHT: 167.1 LBS | HEART RATE: 82 BPM | TEMPERATURE: 97.8 F

## 2019-03-11 DIAGNOSIS — I10 ESSENTIAL HYPERTENSION: Chronic | ICD-10-CM

## 2019-03-11 DIAGNOSIS — M81.0 AGE-RELATED OSTEOPOROSIS WITHOUT CURRENT PATHOLOGICAL FRACTURE: ICD-10-CM

## 2019-03-11 DIAGNOSIS — E11.29 DIABETES MELLITUS WITH MICROALBUMINURIA (HCC): Primary | ICD-10-CM

## 2019-03-11 DIAGNOSIS — R80.9 DIABETES MELLITUS WITH MICROALBUMINURIA (HCC): Primary | ICD-10-CM

## 2019-03-11 DIAGNOSIS — R79.89 ABNORMAL TSH: ICD-10-CM

## 2019-03-11 DIAGNOSIS — N18.30 CKD (CHRONIC KIDNEY DISEASE), STAGE III (HCC): ICD-10-CM

## 2019-03-11 DIAGNOSIS — E78.2 MIXED HYPERLIPIDEMIA: ICD-10-CM

## 2019-03-11 PROCEDURE — 99214 OFFICE O/P EST MOD 30 MIN: CPT | Performed by: INTERNAL MEDICINE

## 2019-03-11 RX ORDER — HYDROCHLOROTHIAZIDE 12.5 MG/1
TABLET ORAL
Qty: 90 TABLET | Refills: 1 | Status: SHIPPED | OUTPATIENT
Start: 2019-03-11 | End: 2019-11-15 | Stop reason: SDUPTHER

## 2019-03-11 NOTE — ASSESSMENT & PLAN NOTE
Creatinine baseline 1 3-1 4  Current cr is slightly elevated, advised to increase fluid intake  Will monitor

## 2019-03-11 NOTE — ASSESSMENT & PLAN NOTE
DM2 with microalbuminuria and CKD3 - A1c is stable at 7 6 without hypoglycemic events  Continue on basaglar 34units sc daily, med renewed    She is on ACEI

## 2019-03-11 NOTE — ASSESSMENT & PLAN NOTE
Benign, controlled on HCTZ 12 5mg daily, lisinopril 10mg daily and amlodipine 5mg daily    Monitor BMP

## 2019-03-22 ENCOUNTER — OFFICE VISIT (OUTPATIENT)
Dept: CARDIAC SURGERY | Facility: CLINIC | Age: 84
End: 2019-03-22
Payer: MEDICARE

## 2019-03-22 VITALS
SYSTOLIC BLOOD PRESSURE: 142 MMHG | BODY MASS INDEX: 31.64 KG/M2 | DIASTOLIC BLOOD PRESSURE: 78 MMHG | HEIGHT: 61 IN | OXYGEN SATURATION: 90 % | HEART RATE: 83 BPM | TEMPERATURE: 97.5 F | WEIGHT: 167.6 LBS

## 2019-03-22 DIAGNOSIS — C34.91 SQUAMOUS CELL LUNG CANCER, RIGHT (HCC): Primary | ICD-10-CM

## 2019-03-22 PROCEDURE — 99213 OFFICE O/P EST LOW 20 MIN: CPT | Performed by: PHYSICIAN ASSISTANT

## 2019-03-22 NOTE — PROGRESS NOTES
Thoracic Follow-Up  Assessment/Plan:    Squamous cell lung cancer, right [facility])  Ms Gerard Medina is doing well 7 years out from resection of her right lower lobe squamous cell lung cancer  Her most recent CT chest demonstrates stable right middle lobe scarring without radiographic evidence of recurrence  She is to return to the office in one year with a new CT chest to continue routine lung cancer surveillance  She is in agreement with this plan  Diagnoses and all orders for this visit:    Squamous cell lung cancer, right ([address] )  -     CT chest wo contrast; Future          Thoracic History   Diagnosis: Stage IA non-small cell lung carcinoma  Procedures: 1  Flexible bronchoscopy, right thoracoscopic lower lobe wedge resection, completion lobectomy, and mediastinal lymph node dissection performed on March 1, 2012  Jayce Hans Flexible bronchoscopy with endobronchial washings performed on March 4, 2012  Eduadro Hans Flexible bronchoscopy, endobronchial washings performed on March 7, 2012  Eduardo Hans Flexible bronchoscopy, endobronchial washings, and right chest tube insertion performed on March 9, 2012  Pathology: Right lower lobe is consistent with moderately differentiated squamous cell carcinoma measuring 1 2 cm in greatest dimension  There was no evidence of visceral pleural invasion or lymphovascular invasion  All lymph nodes including levels right paratracheal, 7, 9, 10, and 12 were negative for metastatic carcinoma  Seventh Edition AJCC tumor stage IA (T1a, N0,M0)  Adjuvant Therapy: None indicated per NCCN guidelines  Subjective:    Patient ID: Anamaria Kilgore is a 80 y o  female  HPI   Ms Gerard Medina is an 80year old female with history of stage IA squamous cell carcinoma of right lower lobe who underwent thoracoscopic right lower lobectomy 3/1/2012  Last CT chest demonstrated right middle lobe scarring which was stable     Today she presents for routine follow up with a new CT chest 3/8/19 that continues to show stable right middle lobe scarring  No evidence of recurrence, no concerning lymphadenopathy or new pulmonary nodules  On discussion, she has been feeling pretty well  Denies fevers, chills, unexpected weight loss, shortness of breath, chest pain, cough, or hemoptysis  Has some exertional dyspnea which is stable for her  The following portions of the patient's history were reviewed and updated as appropriate: allergies, current medications, past family history, past medical history, past social history, past surgical history and problem list     Review of Systems   Constitutional: Negative for activity change, appetite change, chills, diaphoresis, fatigue, fever and unexpected weight change  Respiratory: Negative for cough, chest tightness, shortness of breath and wheezing  Cardiovascular: Negative for chest pain and palpitations  Endocrine: Negative for cold intolerance and heat intolerance  Musculoskeletal: Positive for gait problem (needs cane to ambulate)  Skin: Negative  Hematological: Negative for adenopathy  Does not bruise/bleed easily  Objective:   Physical Exam   Constitutional: She is oriented to person, place, and time  She appears well-developed and well-nourished  No distress  HENT:   Head: Normocephalic and atraumatic  Eyes: Conjunctivae are normal  No scleral icterus  Neck: Neck supple  No tracheal deviation present  Cardiovascular: Normal rate, regular rhythm and normal heart sounds  Pulmonary/Chest: Effort normal and breath sounds normal  No respiratory distress  Abdominal: Soft  Bowel sounds are normal  She exhibits no distension  Lymphadenopathy:     She has no cervical adenopathy  Neurological: She is alert and oriented to person, place, and time  Cane to ambulate   Skin: Skin is warm and dry  Psychiatric: She has a normal mood and affect   Her behavior is normal  Judgment and thought content normal    /78 (BP Location: Left arm, Patient Position: Sitting, Cuff Size: Adult)   Pulse 83   Temp 97 5 °F (36 4 °C) (Tympanic)   Ht 5' 1" (1 549 m)   Wt 76 kg (167 lb 9 6 oz)   SpO2 90%   BMI 31 67 kg/m²       Ct Chest Wo Contrast    Result Date: 3/13/2019  Narrative CT CHEST WITHOUT IV CONTRAST INDICATION:   C34 91: Malignant neoplasm of unspecified part of right bronchus or lung  History of right lower lobe squamous cell carcinoma status post lobectomy  Follow-up exam  COMPARISON:  CT chest 3/5/2018  TECHNIQUE: CT examination of the chest was performed without intravenous contrast   Axial, sagittal, and coronal 2D reformatted images were created from the source data and submitted for interpretation  Radiation dose length product (DLP) for this visit:  413 69 mGy-cm   This examination, like all CT scans performed in the [facility], was performed utilizing techniques to minimize radiation dose exposure, including the use of iterative  reconstruction and automated exposure control  FINDINGS: LUNGS: Status post right lower lobectomy  Stable scarring in the lateral right middle lobe   No new or suspicious pulmonary lesions  There is no tracheal or endobronchial lesion  PLEURA:  Unremarkable  HEART/GREAT VESSELS:  Normal heart size  Coronary artery disease  Aortic atherosclerosis without aneurysm  MEDIASTINUM AND CASSIE:  Unremarkable  CHEST WALL AND LOWER NECK:   Unremarkable  VISUALIZED STRUCTURES IN THE UPPER ABDOMEN:  Stable hiatal hernia  Stable bilateral adrenal thickening without underlying mass  Cholelithiasis  OSSEOUS STRUCTURES:  No acute fracture or destructive osseous lesion  Old right third and fourth rib fractures  Stable chronic T11 compression deformity  Impression 1  Status post right lower lobectomy without evidence of recurrent or metastatic disease within the chest  2  Stable hiatal hernia, cholelithiasis and T11 compression fracture   Workstation performed: [id number]

## 2019-03-22 NOTE — ASSESSMENT & PLAN NOTE
Ms Bill Barber is doing well 7 years out from resection of her right lower lobe squamous cell lung cancer  Her most recent CT chest demonstrates stable right middle lobe scarring without radiographic evidence of recurrence  She is to return to the office in one year with a new CT chest to continue routine lung cancer surveillance  She is in agreement with this plan

## 2019-03-29 ENCOUNTER — OFFICE VISIT (OUTPATIENT)
Dept: INTERNAL MEDICINE CLINIC | Facility: CLINIC | Age: 84
End: 2019-03-29
Payer: MEDICARE

## 2019-03-29 VITALS
RESPIRATION RATE: 14 BRPM | TEMPERATURE: 99.7 F | DIASTOLIC BLOOD PRESSURE: 76 MMHG | WEIGHT: 168 LBS | HEART RATE: 94 BPM | HEIGHT: 61 IN | OXYGEN SATURATION: 99 % | SYSTOLIC BLOOD PRESSURE: 138 MMHG | BODY MASS INDEX: 31.72 KG/M2

## 2019-03-29 DIAGNOSIS — J11.1 INFLUENZA: Primary | ICD-10-CM

## 2019-03-29 PROCEDURE — 99213 OFFICE O/P EST LOW 20 MIN: CPT | Performed by: NURSE PRACTITIONER

## 2019-03-29 RX ORDER — OSELTAMIVIR PHOSPHATE 30 MG/1
30 CAPSULE ORAL 2 TIMES DAILY
Qty: 10 CAPSULE | Refills: 0 | Status: SHIPPED | OUTPATIENT
Start: 2019-03-29 | End: 2019-04-03

## 2019-03-29 NOTE — ASSESSMENT & PLAN NOTE
Treated for presumptive influenza due to positive exposure on Monday  Tamiflu 30 mg BID x 5 days due to CrCl of 30  Encouraged tylenol prn and increased fluids and continued PO intake  Pt should call if she develops worsening symptoms

## 2019-03-29 NOTE — PROGRESS NOTES
Assessment/Plan:    Influenza  Treated for presumptive influenza due to positive exposure on Monday  Tamiflu 30 mg BID x 5 days due to CrCl of 30  Encouraged tylenol prn and increased fluids and continued PO intake  Pt should call if she develops worsening symptoms  Diagnoses and all orders for this visit:    Influenza  -     oseltamivir (TAMIFLU) 30 MG capsule; Take 1 capsule (30 mg total) by mouth 2 (two) times a day for 5 days  -     dextromethorphan-guaifenesin (MUCINEX DM)  MG per 12 hr tablet; Take 1 tablet by mouth 2 (two) times a day as needed for cough          Subjective:      Patient ID: Carmelo Fatima is a 80 y o  female  Pt is a 80 y o  y/o female who is seen today for evaluation of URI symptoms following exposure to influenza on Monday  She developed symptoms yesterday, which include a productive cough, rhinorrhea, fever/chills, sinus pressure/headache, and decreased appetite  She denies body aches, n/v/d  She reports adequate PO intake  She has taken acetaminophen  PMH of lung cancer, lymphoma, liver cancer, and bladder cancer  The following portions of the patient's history were reviewed and updated as appropriate: allergies, current medications, past family history, past medical history, past social history, past surgical history and problem list     Review of Systems   Constitutional: Positive for appetite change, chills, fatigue and fever  HENT: Positive for rhinorrhea and sinus pressure  Negative for congestion, ear pain, postnasal drip and sore throat  Respiratory: Positive for cough  Negative for chest tightness, shortness of breath and wheezing  Cardiovascular: Negative for chest pain, palpitations and leg swelling  Gastrointestinal: Negative for abdominal pain, diarrhea, nausea and vomiting  Genitourinary: Negative for dysuria  Musculoskeletal: Negative for myalgias  Neurological: Positive for headaches  Negative for dizziness     Hematological: Negative for adenopathy  Psychiatric/Behavioral: Negative for sleep disturbance  Objective:      /76 (BP Location: Right arm, Patient Position: Sitting, Cuff Size: Large)   Pulse 94   Temp 99 7 °F (37 6 °C)   Resp 14   Ht 5' 1" (1 549 m)   Wt 76 2 kg (168 lb)   SpO2 99%   BMI 31 74 kg/m²          Physical Exam   Constitutional: She is oriented to person, place, and time  Vital signs are normal  She appears well-developed and well-nourished  She is cooperative  HENT:   Right Ear: Hearing, tympanic membrane, external ear and ear canal normal  Tympanic membrane is not bulging  No middle ear effusion  Left Ear: Hearing, tympanic membrane, external ear and ear canal normal  Tympanic membrane is not bulging  No middle ear effusion  Nose: No mucosal edema or rhinorrhea  Mouth/Throat: Mucous membranes are dry  No oropharyngeal exudate, posterior oropharyngeal edema, posterior oropharyngeal erythema or tonsillar abscesses  Eyes: Conjunctivae are normal    Cardiovascular: Normal rate, regular rhythm, normal heart sounds and intact distal pulses  No murmur heard  Pulmonary/Chest: Effort normal and breath sounds normal  No accessory muscle usage  No respiratory distress  She has no decreased breath sounds  She has no wheezes  She has no rhonchi  She has no rales  Musculoskeletal: She exhibits no edema  Lymphadenopathy:        Head (right side): No submental, no submandibular, no tonsillar, no preauricular, no posterior auricular and no occipital adenopathy present  Head (left side): No submental, no submandibular, no tonsillar, no preauricular, no posterior auricular and no occipital adenopathy present  She has no cervical adenopathy  Neurological: She is alert and oriented to person, place, and time  Skin: Skin is warm, dry and intact  Psychiatric: She has a normal mood and affect   Her speech is normal and behavior is normal  Judgment and thought content normal  Cognition and memory are normal    Vitals reviewed

## 2019-04-05 ENCOUNTER — APPOINTMENT (OUTPATIENT)
Dept: RADIOLOGY | Facility: CLINIC | Age: 84
End: 2019-04-05
Payer: MEDICARE

## 2019-04-05 ENCOUNTER — OFFICE VISIT (OUTPATIENT)
Dept: INTERNAL MEDICINE CLINIC | Facility: CLINIC | Age: 84
End: 2019-04-05
Payer: MEDICARE

## 2019-04-05 VITALS
BODY MASS INDEX: 30.78 KG/M2 | HEART RATE: 99 BPM | DIASTOLIC BLOOD PRESSURE: 62 MMHG | SYSTOLIC BLOOD PRESSURE: 128 MMHG | RESPIRATION RATE: 16 BRPM | HEIGHT: 61 IN | OXYGEN SATURATION: 91 % | WEIGHT: 163 LBS | TEMPERATURE: 98.4 F

## 2019-04-05 DIAGNOSIS — J20.9 ACUTE BRONCHITIS, UNSPECIFIED ORGANISM: ICD-10-CM

## 2019-04-05 DIAGNOSIS — J20.9 ACUTE BRONCHITIS, UNSPECIFIED ORGANISM: Primary | ICD-10-CM

## 2019-04-05 PROCEDURE — 71046 X-RAY EXAM CHEST 2 VIEWS: CPT

## 2019-04-05 PROCEDURE — 99213 OFFICE O/P EST LOW 20 MIN: CPT | Performed by: NURSE PRACTITIONER

## 2019-04-05 RX ORDER — AZITHROMYCIN 250 MG/1
500 TABLET, FILM COATED ORAL EVERY 24 HOURS
Qty: 10 TABLET | Refills: 0 | Status: SHIPPED | OUTPATIENT
Start: 2019-04-05 | End: 2019-04-10

## 2019-04-08 DIAGNOSIS — J18.9 PNEUMONIA OF LEFT LOWER LOBE DUE TO INFECTIOUS ORGANISM: Primary | ICD-10-CM

## 2019-05-01 ENCOUNTER — APPOINTMENT (OUTPATIENT)
Dept: RADIOLOGY | Facility: CLINIC | Age: 84
End: 2019-05-01
Payer: MEDICARE

## 2019-05-01 ENCOUNTER — OFFICE VISIT (OUTPATIENT)
Dept: INTERNAL MEDICINE CLINIC | Facility: CLINIC | Age: 84
End: 2019-05-01
Payer: MEDICARE

## 2019-05-01 VITALS
OXYGEN SATURATION: 98 % | HEART RATE: 94 BPM | DIASTOLIC BLOOD PRESSURE: 68 MMHG | BODY MASS INDEX: 30.96 KG/M2 | HEIGHT: 61 IN | SYSTOLIC BLOOD PRESSURE: 132 MMHG | WEIGHT: 164 LBS | TEMPERATURE: 97.8 F | RESPIRATION RATE: 16 BRPM

## 2019-05-01 DIAGNOSIS — J40 BRONCHITIS: Primary | ICD-10-CM

## 2019-05-01 DIAGNOSIS — J18.9 PNEUMONIA OF LEFT LOWER LOBE DUE TO INFECTIOUS ORGANISM: ICD-10-CM

## 2019-05-01 DIAGNOSIS — I10 ESSENTIAL HYPERTENSION: Chronic | ICD-10-CM

## 2019-05-01 PROCEDURE — 71046 X-RAY EXAM CHEST 2 VIEWS: CPT

## 2019-05-01 PROCEDURE — 94640 AIRWAY INHALATION TREATMENT: CPT | Performed by: INTERNAL MEDICINE

## 2019-05-01 PROCEDURE — 99214 OFFICE O/P EST MOD 30 MIN: CPT | Performed by: NURSE PRACTITIONER

## 2019-05-01 RX ORDER — ALBUTEROL SULFATE 2.5 MG/3ML
2.5 SOLUTION RESPIRATORY (INHALATION) ONCE
Status: COMPLETED | OUTPATIENT
Start: 2019-05-01 | End: 2019-05-01

## 2019-05-01 RX ORDER — ALBUTEROL SULFATE 90 UG/1
2 AEROSOL, METERED RESPIRATORY (INHALATION) EVERY 6 HOURS PRN
Qty: 1 INHALER | Refills: 0 | Status: SHIPPED | OUTPATIENT
Start: 2019-05-01 | End: 2019-08-09

## 2019-05-01 RX ORDER — DOXYCYCLINE HYCLATE 100 MG/1
100 CAPSULE ORAL EVERY 12 HOURS SCHEDULED
Qty: 14 CAPSULE | Refills: 0 | Status: SHIPPED | OUTPATIENT
Start: 2019-05-01 | End: 2019-05-08

## 2019-05-01 RX ORDER — BENZONATATE 100 MG/1
100 CAPSULE ORAL 3 TIMES DAILY PRN
Qty: 20 CAPSULE | Refills: 0 | Status: SHIPPED | OUTPATIENT
Start: 2019-05-01 | End: 2019-08-09 | Stop reason: ALTCHOICE

## 2019-05-01 RX ADMIN — ALBUTEROL SULFATE 2.5 MG: 2.5 SOLUTION RESPIRATORY (INHALATION) at 13:56

## 2019-05-02 DIAGNOSIS — R93.89 ABNORMAL CHEST X-RAY: ICD-10-CM

## 2019-05-02 DIAGNOSIS — J18.9 PNEUMONIA OF LEFT LOWER LOBE DUE TO INFECTIOUS ORGANISM: Primary | ICD-10-CM

## 2019-06-14 ENCOUNTER — APPOINTMENT (OUTPATIENT)
Dept: RADIOLOGY | Facility: CLINIC | Age: 84
End: 2019-06-14
Payer: MEDICARE

## 2019-06-14 DIAGNOSIS — R93.89 ABNORMAL CHEST X-RAY: ICD-10-CM

## 2019-06-14 PROCEDURE — 71046 X-RAY EXAM CHEST 2 VIEWS: CPT

## 2019-06-16 DIAGNOSIS — E78.2 MIXED HYPERLIPIDEMIA: ICD-10-CM

## 2019-06-17 RX ORDER — PRAVASTATIN SODIUM 20 MG
TABLET ORAL
Qty: 90 TABLET | Refills: 1 | Status: SHIPPED | OUTPATIENT
Start: 2019-06-17 | End: 2019-12-03 | Stop reason: SDUPTHER

## 2019-07-06 DIAGNOSIS — I10 ESSENTIAL HYPERTENSION: Chronic | ICD-10-CM

## 2019-07-07 RX ORDER — LISINOPRIL 10 MG/1
TABLET ORAL
Qty: 90 TABLET | Refills: 1 | Status: SHIPPED | OUTPATIENT
Start: 2019-07-07 | End: 2019-12-03 | Stop reason: SDUPTHER

## 2019-08-05 ENCOUNTER — APPOINTMENT (OUTPATIENT)
Dept: LAB | Facility: CLINIC | Age: 84
End: 2019-08-05
Payer: MEDICARE

## 2019-08-05 DIAGNOSIS — R79.89 ABNORMAL TSH: ICD-10-CM

## 2019-08-05 DIAGNOSIS — R80.9 DIABETES MELLITUS WITH MICROALBUMINURIA (HCC): ICD-10-CM

## 2019-08-05 DIAGNOSIS — I10 ESSENTIAL HYPERTENSION: Chronic | ICD-10-CM

## 2019-08-05 DIAGNOSIS — E11.29 DIABETES MELLITUS WITH MICROALBUMINURIA (HCC): ICD-10-CM

## 2019-08-05 DIAGNOSIS — M81.0 AGE-RELATED OSTEOPOROSIS WITHOUT CURRENT PATHOLOGICAL FRACTURE: ICD-10-CM

## 2019-08-05 LAB
25(OH)D3 SERPL-MCNC: 29.4 NG/ML (ref 30–100)
ANION GAP SERPL CALCULATED.3IONS-SCNC: 5 MMOL/L (ref 4–13)
BUN SERPL-MCNC: 39 MG/DL (ref 5–25)
CALCIUM SERPL-MCNC: 9.4 MG/DL (ref 8.3–10.1)
CHLORIDE SERPL-SCNC: 111 MMOL/L (ref 100–108)
CO2 SERPL-SCNC: 27 MMOL/L (ref 21–32)
CREAT SERPL-MCNC: 1.39 MG/DL (ref 0.6–1.3)
EST. AVERAGE GLUCOSE BLD GHB EST-MCNC: 157 MG/DL
GFR SERPL CREATININE-BSD FRML MDRD: 34 ML/MIN/1.73SQ M
GLUCOSE P FAST SERPL-MCNC: 106 MG/DL (ref 65–99)
HBA1C MFR BLD: 7.1 % (ref 4.2–6.3)
POTASSIUM SERPL-SCNC: 5.3 MMOL/L (ref 3.5–5.3)
SODIUM SERPL-SCNC: 143 MMOL/L (ref 136–145)
TSH SERPL DL<=0.05 MIU/L-ACNC: 0.4 UIU/ML (ref 0.36–3.74)

## 2019-08-05 PROCEDURE — 82306 VITAMIN D 25 HYDROXY: CPT

## 2019-08-05 PROCEDURE — 84443 ASSAY THYROID STIM HORMONE: CPT

## 2019-08-05 PROCEDURE — 83036 HEMOGLOBIN GLYCOSYLATED A1C: CPT

## 2019-08-05 PROCEDURE — 80048 BASIC METABOLIC PNL TOTAL CA: CPT

## 2019-08-05 PROCEDURE — 36415 COLL VENOUS BLD VENIPUNCTURE: CPT

## 2019-08-09 ENCOUNTER — OFFICE VISIT (OUTPATIENT)
Dept: INTERNAL MEDICINE CLINIC | Facility: CLINIC | Age: 84
End: 2019-08-09
Payer: MEDICARE

## 2019-08-09 VITALS
HEIGHT: 61 IN | WEIGHT: 165.4 LBS | TEMPERATURE: 97.9 F | DIASTOLIC BLOOD PRESSURE: 80 MMHG | RESPIRATION RATE: 16 BRPM | SYSTOLIC BLOOD PRESSURE: 124 MMHG | HEART RATE: 82 BPM | BODY MASS INDEX: 31.23 KG/M2 | OXYGEN SATURATION: 97 %

## 2019-08-09 DIAGNOSIS — K21.9 CHRONIC GERD: ICD-10-CM

## 2019-08-09 DIAGNOSIS — E78.2 MIXED HYPERLIPIDEMIA: ICD-10-CM

## 2019-08-09 DIAGNOSIS — I10 ESSENTIAL HYPERTENSION: Chronic | ICD-10-CM

## 2019-08-09 DIAGNOSIS — M25.561 ACUTE PAIN OF RIGHT KNEE: ICD-10-CM

## 2019-08-09 DIAGNOSIS — E11.29 DIABETES MELLITUS WITH MICROALBUMINURIA (HCC): Primary | ICD-10-CM

## 2019-08-09 DIAGNOSIS — R79.89 ABNORMAL TSH: ICD-10-CM

## 2019-08-09 DIAGNOSIS — R80.9 DIABETES MELLITUS WITH MICROALBUMINURIA (HCC): Primary | ICD-10-CM

## 2019-08-09 PROBLEM — J11.1 INFLUENZA: Status: RESOLVED | Noted: 2019-03-29 | Resolved: 2019-08-09

## 2019-08-09 PROBLEM — J40 BRONCHITIS: Status: RESOLVED | Noted: 2019-04-05 | Resolved: 2019-08-09

## 2019-08-09 PROCEDURE — 99214 OFFICE O/P EST MOD 30 MIN: CPT | Performed by: INTERNAL MEDICINE

## 2019-08-09 NOTE — PROGRESS NOTES
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 - A1c is improved at 7 1 without hypoglycemic events  Continue basically are 34 units daily with, medication renewed  Foot exam performed  She is on ACEI  Relevant Medications    insulin glargine (BASAGLAR KWIKPEN) 100 units/mL injection pen    Other Relevant Orders    Hemoglobin A1C    Microalbumin / creatinine urine ratio       Cardiovascular and Mediastinum    Essential hypertension (Chronic)     BP acceptable  Continue on amlodipine 5 mg daily, HCTZ 12 5 mg daily, lisinopril 10 mg daily  Relevant Orders    Comprehensive metabolic panel       Other    Hyperlipidemia    Relevant Orders    Lipid panel    Abnormal TSH     Normalized on recent labs  Acute pain of right knee     Recommend symptomatic treatment with topical agents such as Aspercreme for now  May also consider wearing knee sleeve for additional support when ambulating  Subjective:      Patient ID: Uziel Euceda is a 80 y o  female  20-year-old female with DM 2 with microalbuminuria, HLD, HTN with CKD 3, reflux, bladder cancer and RLL lung cancer s/p lobectomy and h/o lymphoma here for follow-up care  She is accompanied by her daughter  Thyroid Problem   Presents for follow-up visit  Patient reports no constipation or weight loss  The symptoms have been stable  Hypertension   This is a chronic problem  The current episode started more than 1 year ago  The problem is controlled  Pertinent negatives include no headaches  Past treatments include diuretics, ACE inhibitors and calcium channel blockers  Hypertensive end-organ damage includes kidney disease   Identifiable causes of hypertension include a thyroid problem  Diabetes   She presents for her follow-up diabetic visit  She has type 2 diabetes mellitus  Her disease course has been improving  There are no hypoglycemic associated symptoms  Pertinent negatives for hypoglycemia include no dizziness or headaches  Pertinent negatives for diabetes include no weight loss  (Microalbuminuria) Current diabetic treatment includes insulin injections  (-160s though over the past 2-3 weeks have been around 140-200s ) An ACE inhibitor/angiotensin II receptor blocker is being taken  R anterior knee soreness that occurs intermittently about one month ago which at the time she thought she may have twisted it in bed  Improves with extension of her knees  Denies knee swelling, crepitus  She is afraid that she is going to fall when she walks  Gets reflux, depends on what she eats  Takes [facility] acid reflux pill or takes maalox at times  The following portions of the patient's history were reviewed and updated as appropriate: allergies, current medications, past family history, past medical history, past social history, past surgical history and problem list     Current Outpatient Medications:     acetaminophen (TYLENOL) 325 mg tablet, Take 2 tablets (650 mg total) by mouth every 6 (six) hours as needed for mild pain, headaches or fever  , Disp: 30 tablet, Rfl: 0    amLODIPine (NORVASC) 5 mg tablet, TAKE 1 TABLET DAILY, Disp: 90 tablet, Rfl: 1    calcium citrate-vitamin D (CITRACAL+D) 315-200 MG-UNIT per tablet, Take 1 tablet by mouth daily  , Disp: , Rfl:     Cyanocobalamin (VITAMIN B-12 PO), Take 1 tablet by mouth , Disp: , Rfl:     hydrochlorothiazide (HYDRODIURIL) 12 5 mg tablet, TAKE 1 TABLET DAILY, Disp: 90 tablet, Rfl: 1    insulin glargine (BASAGLAR KWIKPEN) 100 units/mL injection pen, Inject 34 Units under the skin daily, Disp: 15 mL, Rfl: 1    Insulin Pen Needle (BD ULTRA-FINE PEN NEEDLES) 29G X 12 7MM MISC, by Does not apply route 2 (two) times a day, Disp: 180 each, Rfl: 1    lisinopril (ZESTRIL) 10 mg tablet, TAKE 1 TABLET DAILY, Disp: 90 tablet, Rfl: 1    Multiple Vitamin (MULTIVITAMIN) tablet, Take 1 tablet by mouth daily  , Disp: , Rfl:     omeprazole (PriLOSEC) 20 mg delayed release capsule, Take 20 mg by mouth daily  , Disp: , Rfl:     pravastatin (PRAVACHOL) 20 mg tablet, TAKE 1 TABLET DAILY, Disp: 90 tablet, Rfl: 1    Travoprost (TRAVATAN OP), Apply to eye, Disp: , Rfl:       Review of Systems   Constitutional: Negative for unexpected weight change and weight loss  Respiratory: Negative  Cardiovascular: Negative  Gastrointestinal: Negative for constipation  Acid reflux   Genitourinary:        Incontinence   Musculoskeletal: Positive for back pain  Knee pain   Neurological: Negative for dizziness, numbness and headaches  Objective:    /80 (BP Location: Left arm, Patient Position: Sitting)   Pulse 82   Temp 97 9 °F (36 6 °C)   Resp 16   Ht 5' 1" (1 549 m)   Wt 75 kg (165 lb 6 4 oz)   SpO2 97%   BMI 31 25 kg/m²      Physical Exam   Constitutional: She is oriented to person, place, and time  She appears well-developed  HENT:   Mouth/Throat: Oropharynx is clear and moist    Wears dentures   Neck: Neck supple  Cardiovascular: Normal rate, regular rhythm and normal heart sounds  Pulses are no weak pulses  Pulses:       Dorsalis pedis pulses are 2+ on the right side, and 2+ on the left side  Posterior tibial pulses are 2+ on the right side, and 2+ on the left side  Pulmonary/Chest: Effort normal and breath sounds normal  No stridor  No respiratory distress  Musculoskeletal:        Right knee: She exhibits deformity  She exhibits normal range of motion and no effusion  No tenderness found  Right knee joint hypertrophy  Ambulates with a cane   Feet:   Right Foot:   Skin Integrity: Positive for dry skin  Left Foot:   Skin Integrity: Positive for dry skin     Neurological: She is alert and oriented to person, place, and time  Vitals reviewed  Patient's shoes and socks removed  Right Foot/Ankle   Right Foot Inspection  Skin Exam: skin intact and dry skin                          Toe Exam: right toe deformity  Sensory       Monofilament testing: intact  Vascular    The right DP pulse is 2+  The right PT pulse is 2+  Left Foot/Ankle  Left Foot Inspection  Skin Exam: skin intact and dry skin                         Toe Exam: left toe deformity                   Sensory       Monofilament: intact  Vascular    The left DP pulse is 2+  The left PT pulse is 2+  Assign Risk Category:  Deformity present; No loss of protective sensation;  No weak pulses       Risk: 1  onychomycotic nails b/l  Recent Results (from the past 336 hour(s))   TSH, 3rd generation with Free T4 reflex    Collection Time: 08/05/19  8:30 AM   Result Value Ref Range    TSH 3RD GENERATON 0 403 0 358 - 3 740 uIU/mL   Vitamin D 25 hydroxy    Collection Time: 08/05/19  8:30 AM   Result Value Ref Range    Vit D, 25-Hydroxy 29 4 (L) 30 0 - 100 0 ng/mL   Basic metabolic panel    Collection Time: 08/05/19  8:30 AM   Result Value Ref Range    Sodium 143 136 - 145 mmol/L    Potassium 5 3 3 5 - 5 3 mmol/L    Chloride 111 (H) 100 - 108 mmol/L    CO2 27 21 - 32 mmol/L    ANION GAP 5 4 - 13 mmol/L    BUN 39 (H) 5 - 25 mg/dL    Creatinine 1 39 (H) 0 60 - 1 30 mg/dL    Glucose, Fasting 106 (H) 65 - 99 mg/dL    Calcium 9 4 8 3 - 10 1 mg/dL    eGFR 34 ml/min/1 73sq m   Hemoglobin A1C    Collection Time: 08/05/19  8:30 AM   Result Value Ref Range    Hemoglobin A1C 7 1 (H) 4 2 - 6 3 %     mg/dl

## 2019-08-09 NOTE — ASSESSMENT & PLAN NOTE
Recommend symptomatic treatment with topical agents such as Aspercreme for now  May also consider wearing knee sleeve for additional support when ambulating

## 2019-08-09 NOTE — ASSESSMENT & PLAN NOTE
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

## 2019-08-14 DIAGNOSIS — E11.29 DIABETES MELLITUS WITH MICROALBUMINURIA (HCC): ICD-10-CM

## 2019-08-14 DIAGNOSIS — R80.9 DIABETES MELLITUS WITH MICROALBUMINURIA (HCC): ICD-10-CM

## 2019-08-14 DIAGNOSIS — IMO0002 UNCONTROLLED TYPE 2 DIABETES MELLITUS WITH MICROALBUMINURIA, WITHOUT LONG-TERM CURRENT USE OF INSULIN: ICD-10-CM

## 2019-08-15 DIAGNOSIS — R80.9 DIABETES MELLITUS WITH MICROALBUMINURIA (HCC): ICD-10-CM

## 2019-08-15 DIAGNOSIS — E11.29 DIABETES MELLITUS WITH MICROALBUMINURIA (HCC): ICD-10-CM

## 2019-08-15 NOTE — TELEPHONE ENCOUNTER
Medication was faxed to [facility] 5-6days ago, so kwikpens were delivered from [facility] already?

## 2019-09-23 LAB
LEFT EYE DIABETIC RETINOPATHY: NORMAL
RIGHT EYE DIABETIC RETINOPATHY: NORMAL

## 2019-10-20 DIAGNOSIS — I10 BENIGN ESSENTIAL HTN: ICD-10-CM

## 2019-10-21 RX ORDER — AMLODIPINE BESYLATE 5 MG/1
TABLET ORAL
Qty: 90 TABLET | Refills: 1 | Status: SHIPPED | OUTPATIENT
Start: 2019-10-21 | End: 2020-01-31

## 2019-11-11 DIAGNOSIS — R80.9 DIABETES MELLITUS WITH MICROALBUMINURIA (HCC): ICD-10-CM

## 2019-11-11 DIAGNOSIS — E11.29 DIABETES MELLITUS WITH MICROALBUMINURIA (HCC): ICD-10-CM

## 2019-11-15 DIAGNOSIS — I10 ESSENTIAL HYPERTENSION: Chronic | ICD-10-CM

## 2019-11-15 RX ORDER — HYDROCHLOROTHIAZIDE 12.5 MG/1
12.5 TABLET ORAL DAILY
Qty: 90 TABLET | Refills: 1 | Status: SHIPPED | OUTPATIENT
Start: 2019-11-15 | End: 2019-11-18 | Stop reason: SDUPTHER

## 2019-11-16 DIAGNOSIS — I10 ESSENTIAL HYPERTENSION: Chronic | ICD-10-CM

## 2019-11-18 RX ORDER — HYDROCHLOROTHIAZIDE 12.5 MG/1
12.5 TABLET ORAL DAILY
Qty: 90 TABLET | Refills: 1 | Status: SHIPPED | OUTPATIENT
Start: 2019-11-18 | End: 2020-03-19

## 2019-11-18 RX ORDER — HYDROCHLOROTHIAZIDE 12.5 MG/1
TABLET ORAL
Qty: 90 TABLET | Refills: 0 | OUTPATIENT
Start: 2019-11-18

## 2019-12-03 DIAGNOSIS — I10 ESSENTIAL HYPERTENSION: Chronic | ICD-10-CM

## 2019-12-03 DIAGNOSIS — E78.2 MIXED HYPERLIPIDEMIA: ICD-10-CM

## 2019-12-03 RX ORDER — LISINOPRIL 10 MG/1
TABLET ORAL
Qty: 90 TABLET | Refills: 1 | Status: SHIPPED | OUTPATIENT
Start: 2019-12-03 | End: 2020-03-19

## 2019-12-03 RX ORDER — PRAVASTATIN SODIUM 20 MG
TABLET ORAL
Qty: 90 TABLET | Refills: 1 | Status: SHIPPED | OUTPATIENT
Start: 2019-12-03 | End: 2020-03-19

## 2019-12-04 ENCOUNTER — APPOINTMENT (OUTPATIENT)
Dept: LAB | Facility: CLINIC | Age: 84
End: 2019-12-04
Payer: MEDICARE

## 2019-12-04 DIAGNOSIS — E11.29 DIABETES MELLITUS WITH MICROALBUMINURIA (HCC): ICD-10-CM

## 2019-12-04 DIAGNOSIS — R80.9 DIABETES MELLITUS WITH MICROALBUMINURIA (HCC): ICD-10-CM

## 2019-12-04 DIAGNOSIS — E78.2 MIXED HYPERLIPIDEMIA: ICD-10-CM

## 2019-12-04 DIAGNOSIS — I10 ESSENTIAL HYPERTENSION: Chronic | ICD-10-CM

## 2019-12-04 LAB
ALBUMIN SERPL BCP-MCNC: 3.5 G/DL (ref 3.5–5)
ALP SERPL-CCNC: 68 U/L (ref 46–116)
ALT SERPL W P-5'-P-CCNC: 16 U/L (ref 12–78)
ANION GAP SERPL CALCULATED.3IONS-SCNC: 7 MMOL/L (ref 4–13)
AST SERPL W P-5'-P-CCNC: 14 U/L (ref 5–45)
BILIRUB SERPL-MCNC: 0.17 MG/DL (ref 0.2–1)
BUN SERPL-MCNC: 43 MG/DL (ref 5–25)
CALCIUM SERPL-MCNC: 9.4 MG/DL (ref 8.3–10.1)
CHLORIDE SERPL-SCNC: 107 MMOL/L (ref 100–108)
CHOLEST SERPL-MCNC: 132 MG/DL (ref 50–200)
CO2 SERPL-SCNC: 25 MMOL/L (ref 21–32)
CREAT SERPL-MCNC: 1.84 MG/DL (ref 0.6–1.3)
CREAT UR-MCNC: 67.5 MG/DL
EST. AVERAGE GLUCOSE BLD GHB EST-MCNC: 157 MG/DL
GFR SERPL CREATININE-BSD FRML MDRD: 24 ML/MIN/1.73SQ M
GLUCOSE P FAST SERPL-MCNC: 85 MG/DL (ref 65–99)
HBA1C MFR BLD: 7.1 % (ref 4.2–6.3)
HDLC SERPL-MCNC: 44 MG/DL
LDLC SERPL CALC-MCNC: 60 MG/DL (ref 0–100)
MICROALBUMIN UR-MCNC: 7.1 MG/L (ref 0–20)
MICROALBUMIN/CREAT 24H UR: 11 MG/G CREATININE (ref 0–30)
NONHDLC SERPL-MCNC: 88 MG/DL
POTASSIUM SERPL-SCNC: 4.8 MMOL/L (ref 3.5–5.3)
PROT SERPL-MCNC: 7.9 G/DL (ref 6.4–8.2)
SODIUM SERPL-SCNC: 139 MMOL/L (ref 136–145)
TRIGL SERPL-MCNC: 142 MG/DL

## 2019-12-04 PROCEDURE — 82043 UR ALBUMIN QUANTITATIVE: CPT

## 2019-12-04 PROCEDURE — 83036 HEMOGLOBIN GLYCOSYLATED A1C: CPT

## 2019-12-04 PROCEDURE — 80061 LIPID PANEL: CPT

## 2019-12-04 PROCEDURE — 82570 ASSAY OF URINE CREATININE: CPT

## 2019-12-04 PROCEDURE — 36415 COLL VENOUS BLD VENIPUNCTURE: CPT

## 2019-12-04 PROCEDURE — 80053 COMPREHEN METABOLIC PANEL: CPT

## 2019-12-08 NOTE — PROGRESS NOTES
Assessment/Plan:    Problem List Items Addressed This Visit        Endocrine    Diabetes mellitus with microalbuminuria ([facility] [address] )     DM2 with microalbuminuria and CKD 3 - A1c stable at 7 1 on basaglar 34units daily  Had one reported hypoglycemic episode, continue to monitor on same regimen  On ACEI  Relevant Orders    Hemoglobin A1C    Microalbumin / creatinine urine ratio       Cardiovascular and Mediastinum    Essential hypertension (Chronic)     Fair control  Continue on amlodipine 5mg daily, hctz 12 5mg daily and lisinopril 10mg daily  Encouraged weight loss  Relevant Orders    Comprehensive metabolic panel    CBC       Genitourinary    CKD (chronic kidney disease), stage III ([facility] [address] ) - Primary     LAURA on CKD 3  Cr at 1 8, baseline 1 3-1 4  Advised increasing fluid intake and repeat BMP          Relevant Orders    Basic metabolic panel    Comprehensive metabolic panel       Other    Hyperlipidemia      Other Visit Diagnoses     Medicare annual wellness visit, subsequent        Obesity (BMI 30 0-34  9)              Subjective:      Patient ID: Boby Horn is a 80 y o  female  81yo female with chronic GERD, DM2 with microalbuminuria, CKD3, HTN, HLD, bladder cancer, RLL lung cancer s/p lobectomy and h/o lymphoma here for follow up care  Diabetes   She presents for her follow-up diabetic visit  She has type 2 diabetes mellitus  Her disease course has been stable  Pertinent negatives for hypoglycemia include no dizziness or headaches  (She had one episode of hypoglycemia in the morning and her BS was 75, she felt off  Improved with drinking orange juice) (CKD, microalbuminuria) Risk factors for coronary artery disease include obesity, hypertension, diabetes mellitus, dyslipidemia, post-menopausal and sedentary lifestyle  Current diabetic treatment includes insulin injections  (Range 106-180s) An ACE inhibitor/angiotensin II receptor blocker is being taken     Hyperlipidemia   This is a chronic problem  The current episode started more than 1 year ago  The problem is controlled  Recent lipid tests were reviewed and are normal  Exacerbating diseases include chronic renal disease, diabetes and obesity  Current antihyperlipidemic treatment includes statins  Risk factors for coronary artery disease include dyslipidemia, diabetes mellitus, hypertension, obesity and post-menopausal    Hypertension   This is a chronic problem  The current episode started more than 1 year ago  Pertinent negatives include no headaches  Risk factors for coronary artery disease include post-menopausal state, obesity, diabetes mellitus, dyslipidemia and sedentary lifestyle  Past treatments include calcium channel blockers, ACE inhibitors and diuretics  Compliance problems include exercise  Identifiable causes of hypertension include chronic renal disease  The following portions of the patient's history were reviewed and updated as appropriate: allergies, current medications, past family history, past medical history, past social history, past surgical history and problem list       Current Outpatient Medications:     acetaminophen (TYLENOL) 325 mg tablet, Take 2 tablets (650 mg total) by mouth every 6 (six) hours as needed for mild pain, headaches or fever  , Disp: 30 tablet, Rfl: 0    amLODIPine (NORVASC) 5 mg tablet, TAKE 1 TABLET DAILY, Disp: 90 tablet, Rfl: 1    calcium citrate-vitamin D (CITRACAL+D) 315-200 MG-UNIT per tablet, Take 1 tablet by mouth daily  , Disp: , Rfl:     Cyanocobalamin (VITAMIN B-12 PO), Take 1 tablet by mouth , Disp: , Rfl:     hydrochlorothiazide (HYDRODIURIL) 12 5 mg tablet, Take 1 tablet (12 5 mg total) by mouth daily, Disp: 90 tablet, Rfl: 1    insulin glargine (BASAGLAR KWIKPEN) 100 units/mL injection pen, Inject 34 Units under the skin daily, Disp: 135 mL, Rfl: 1    Insulin Pen Needle (BD ULTRA-FINE PEN NEEDLES) 29G X 12 7MM MISC, by Does not apply route 2 (two) times a day, Disp: 180 each, Rfl: 1    lisinopril (ZESTRIL) 10 mg tablet, TAKE 1 TABLET DAILY, Disp: 90 tablet, Rfl: 1    Multiple Vitamin (MULTIVITAMIN) tablet, Take 1 tablet by mouth daily  , Disp: , Rfl:     omeprazole (PriLOSEC) 20 mg delayed release capsule, Take 20 mg by mouth daily  , Disp: , Rfl:     pravastatin (PRAVACHOL) 20 mg tablet, TAKE 1 TABLET DAILY, Disp: 90 tablet, Rfl: 1    Travoprost (TRAVATAN OP), Apply to eye, Disp: , Rfl:     Review of Systems   Constitutional: Negative for activity change, appetite change and unexpected weight change  HENT: Positive for hearing loss  Respiratory: Negative  Cardiovascular: Negative  Gastrointestinal:        Heartburn   Genitourinary: Negative  Musculoskeletal: Positive for gait problem  R knee pain   Neurological: Negative for dizziness, numbness and headaches  Objective:    /72 (BP Location: Left arm, Patient Position: Sitting, Cuff Size: Adult)   Pulse 88   Temp 97 9 °F (36 6 °C) (Tympanic)   Ht 5' 1" (1 549 m)   Wt 73 5 kg (162 lb)   SpO2 98%   BMI 30 61 kg/m²      Physical Exam   Constitutional: She is oriented to person, place, and time  She appears well-developed and well-nourished  No distress  HENT:   Mouth/Throat: Oropharynx is clear and moist  No oropharyngeal exudate  Wears dentures   Neck: Neck supple  Cardiovascular: Normal rate, regular rhythm, normal heart sounds and intact distal pulses  Pulmonary/Chest: Effort normal and breath sounds normal  No stridor  No respiratory distress  Neurological: She is alert and oriented to person, place, and time  Skin: Skin is dry  Psychiatric: She has a normal mood and affect  Her behavior is normal    Vitals reviewed        Recent Results (from the past 504 hour(s))   Lipid panel    Collection Time: 12/04/19  8:22 AM   Result Value Ref Range    Cholesterol 132 50 - 200 mg/dL    Triglycerides 142 <=150 mg/dL    HDL, Direct 44 >=40 mg/dL    LDL Calculated 60 0 - 100 mg/dL Non-HDL-Chol (CHOL-HDL) 88 mg/dl   Hemoglobin A1C    Collection Time: 12/04/19  8:22 AM   Result Value Ref Range    Hemoglobin A1C 7 1 (H) 4 2 - 6 3 %     mg/dl   Microalbumin / creatinine urine ratio    Collection Time: 12/04/19  8:22 AM   Result Value Ref Range    Creatinine, Ur 67 5 mg/dL    Microalbum  ,U,Random 7 1 0 0 - 20 0 mg/L    Microalb Creat Ratio 11 0 - 30 mg/g creatinine   Comprehensive metabolic panel    Collection Time: 12/04/19  8:22 AM   Result Value Ref Range    Sodium 139 136 - 145 mmol/L    Potassium 4 8 3 5 - 5 3 mmol/L    Chloride 107 100 - 108 mmol/L    CO2 25 21 - 32 mmol/L    ANION GAP 7 4 - 13 mmol/L    BUN 43 (H) 5 - 25 mg/dL    Creatinine 1 84 (H) 0 60 - 1 30 mg/dL    Glucose, Fasting 85 65 - 99 mg/dL    Calcium 9 4 8 3 - 10 1 mg/dL    AST 14 5 - 45 U/L    ALT 16 12 - 78 U/L    Alkaline Phosphatase 68 46 - 116 U/L    Total Protein 7 9 6 4 - 8 2 g/dL    Albumin 3 5 3 5 - 5 0 g/dL    Total Bilirubin 0 17 (L) 0 20 - 1 00 mg/dL    eGFR 24 ml/min/1 73sq m

## 2019-12-09 ENCOUNTER — OFFICE VISIT (OUTPATIENT)
Dept: INTERNAL MEDICINE CLINIC | Facility: CLINIC | Age: 84
End: 2019-12-09
Payer: MEDICARE

## 2019-12-09 VITALS
OXYGEN SATURATION: 98 % | HEART RATE: 88 BPM | HEIGHT: 61 IN | WEIGHT: 162 LBS | TEMPERATURE: 97.9 F | DIASTOLIC BLOOD PRESSURE: 72 MMHG | SYSTOLIC BLOOD PRESSURE: 138 MMHG | BODY MASS INDEX: 30.58 KG/M2

## 2019-12-09 DIAGNOSIS — N18.30 CKD (CHRONIC KIDNEY DISEASE), STAGE III (HCC): Primary | ICD-10-CM

## 2019-12-09 DIAGNOSIS — E78.2 MIXED HYPERLIPIDEMIA: ICD-10-CM

## 2019-12-09 DIAGNOSIS — E66.9 OBESITY (BMI 30.0-34.9): ICD-10-CM

## 2019-12-09 DIAGNOSIS — E11.29 DIABETES MELLITUS WITH MICROALBUMINURIA (HCC): ICD-10-CM

## 2019-12-09 DIAGNOSIS — Z00.00 MEDICARE ANNUAL WELLNESS VISIT, SUBSEQUENT: ICD-10-CM

## 2019-12-09 DIAGNOSIS — R80.9 DIABETES MELLITUS WITH MICROALBUMINURIA (HCC): ICD-10-CM

## 2019-12-09 DIAGNOSIS — I10 ESSENTIAL HYPERTENSION: Chronic | ICD-10-CM

## 2019-12-09 PROBLEM — R79.89 ABNORMAL TSH: Status: RESOLVED | Noted: 2019-03-11 | Resolved: 2019-12-09

## 2019-12-09 PROCEDURE — 99214 OFFICE O/P EST MOD 30 MIN: CPT | Performed by: INTERNAL MEDICINE

## 2019-12-09 PROCEDURE — G0439 PPPS, SUBSEQ VISIT: HCPCS | Performed by: INTERNAL MEDICINE

## 2019-12-09 NOTE — PROGRESS NOTES
Assessment and Plan:     Problem List Items Addressed This Visit     None      Visit Diagnoses     Medicare annual wellness visit, subsequent        Obesity (BMI 30 0-34  9)            BMI Counseling: Body mass index is 30 61 kg/m²  The BMI is above normal  Nutrition recommendations include decreasing portion sizes and moderation in carbohydrate intake  Exercise recommendations include exercising 3-5 times per week  No pharmacotherapy was ordered  Preventive health issues were discussed with patient, and age appropriate screening tests were ordered as noted in patient's After Visit Summary  Personalized health advice and appropriate referrals for health education or preventive services given if needed, as noted in patient's After Visit Summary       History of Present Illness:     Patient presents for Medicare Annual Wellness visit    Patient Care Team:  Atiya Todd DO as PCP - MD Chloé Spaulding MD (Thoracic Surgery)  Jo Winters MD (Urology)  Darwin Kirk MD (Dermatology)     Problem List:     Patient Active Problem List   Diagnosis    Essential hypertension    [facility]    Adenocarcinoma of lung, right ([facility] )    Chronic GERD    Diabetes mellitus type II, uncontrolled ([place] [id number] )    Diabetes mellitus with microalbuminuria ([facility] )    Diffuse large B cell lymphoma ([facility] )    Glaucoma    Hyperlipidemia    Skin lesion of face    Age-related osteoporosis without current pathological fracture    Abnormal TSH    CKD (chronic kidney disease), stage III ([facility] )    Acute pain of right knee      Past Medical and Surgical History:     Past Medical History:   Diagnosis Date    Bladder cancer ([facility]     Diabetes mellitus ([facility]     GERD without esophagitis     Hyperlipidemia     last assessed 4/12/13    Hypertension     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[facility] )     last assessed  7/12/13    Liver cancer ([facility] )     Lung cancer ([facility] )     Squamous Cell Lung CA -Right     [facility]     Non Hodgkin's lymphoma ([facility] )     last assessed 14    Osteoporosis     last assessed  13    Right cataract     last assessed  3/2/15     Past Surgical History:   Procedure Laterality Date    CATARACT EXTRACTION      LIVER LOBECTOMY      LUNG REMOVAL, PARTIAL Right     lobectomy - onset approx 3/12/12 - right lower lobe      Family History:     Family History   Problem Relation Age of Onset    Brain cancer Mother     Other Mother         adenocarcinoma of the accessor sinuses    Diabetes Father     Leukemia Father     Cirrhosis Sister         Alcoholic's cirrhosis - Laennec's      Social History:     Social History     Socioeconomic History    Marital status:       Spouse name: None    Number of children: None    Years of education: None    Highest education level: None   Occupational History    Occupation: retired     Comment:    Social Needs    Financial resource strain: None    Food insecurity:     Worry: None     Inability: None    Transportation needs:     Medical: None     Non-medical: None   Tobacco Use    Smoking status: Former Smoker     Packs/day: 2 00     Years: 40 00     Pack years: 80 00     Last attempt to quit:      Years since quittin 9    Smokeless tobacco: Never Used    Tobacco comment: 60 pack year hx, Quit 15 yrs ago  as per [employer]   Substance and Sexual Activity    Alcohol use: No    Drug use: No    Sexual activity: None   Lifestyle    Physical activity:     Days per week: None     Minutes per session: None    Stress: None   Relationships    Social connections:     Talks on phone: None     Gets together: None     Attends [employer] witness service: None     Active member of club or organization: None     Attends meetings of clubs or organizations: None     Relationship status: None    Intimate partner violence:     Fear of current or ex partner: None     Emotionally abused: None     Physically abused: None     Forced sexual activity: None   Other Topics Concern    None   Social History Narrative    None       Medications and Allergies:     Current Outpatient Medications   Medication Sig Dispense Refill    acetaminophen (TYLENOL) 325 mg tablet Take 2 tablets (650 mg total) by mouth every 6 (six) hours as needed for mild pain, headaches or fever  30 tablet 0    amLODIPine (NORVASC) 5 mg tablet TAKE 1 TABLET DAILY 90 tablet 1    calcium citrate-vitamin D (CITRACAL+D) 315-200 MG-UNIT per tablet Take 1 tablet by mouth daily        Cyanocobalamin (VITAMIN B-12 PO) Take 1 tablet by mouth   hydrochlorothiazide (HYDRODIURIL) 12 5 mg tablet Take 1 tablet (12 5 mg total) by mouth daily 90 tablet 1    insulin glargine (BASAGLAR KWIKPEN) 100 units/mL injection pen Inject 34 Units under the skin daily 135 mL 1    Insulin Pen Needle (BD ULTRA-FINE PEN NEEDLES) 29G X 12 7MM MISC by Does not apply route 2 (two) times a day 180 each 1    lisinopril (ZESTRIL) 10 mg tablet TAKE 1 TABLET DAILY 90 tablet 1    Multiple Vitamin (MULTIVITAMIN) tablet Take 1 tablet by mouth daily   omeprazole (PriLOSEC) 20 mg delayed release capsule Take 20 mg by mouth daily   pravastatin (PRAVACHOL) 20 mg tablet TAKE 1 TABLET DAILY 90 tablet 1    Travoprost (TRAVATAN OP) Apply to eye       No current facility-administered medications for this visit        Allergies   Allergen Reactions    Eye Drops [Tetrahydrozoline] Eye Swelling     Patient states when she went to the eye doctor and received the eye dilation solution it irritated her eyes      Immunizations:     Immunization History   Administered Date(s) Administered    INFLUENZA 09/19/2016, 09/13/2017, 09/06/2018, 09/05/2019    Influenza Split High Dose Preservative Free IM 09/01/2013, 10/06/2014, 10/05/2015, 09/19/2016, 09/05/2019    Influenza TIV (IM) 09/13/2017    Pneumococcal Conjugate 13-Valent 12/01/2014    Pneumococcal Polysaccharide PPV23 [birth year]31    Tdap 12/01/2014      Health Maintenance: There are no preventive care reminders to display for this patient  Topic Date Due    Hepatitis B Vaccine (1 of 3 - Risk 3-dose series) 11/06/1950    Pneumococcal Vaccine: 65+ Years (2 of 2 - PPSV23) 12/01/2015      Medicare Health Risk Assessment:     /72 (BP Location: Left arm, Patient Position: Sitting, Cuff Size: Adult)   Pulse 88   Temp 97 9 °F (36 6 °C) (Tympanic)   Ht 5' 1" (1 549 m)   Wt 73 5 kg (162 lb)   SpO2 98%   BMI 30 61 kg/m²      Malena Navarro is here for her Subsequent Wellness visit  Last Medicare Wellness visit information reviewed, patient interviewed and updates made to the record today  Health Risk Assessment:   Patient rates overall health as very good  Patient feels that their physical health rating is same  Eyesight was rated as same  Hearing was rated as same  Patient feels that their emotional and mental health rating is slightly better  Pain experienced in the last 7 days has been none  Patient states that she has experienced no weight loss or gain in last 6 months  Depression Screening:   PHQ-2 Score: 0      Fall Risk Screening: In the past year, patient has experienced: no history of falling in past year      Urinary Incontinence Screening:   Patient has not leaked urine accidently in the last six months  Home Safety:  Patient does not have trouble with stairs inside or outside of their home  Patient has working smoke alarms and has working carbon monoxide detector  Home safety hazards include: none  Has stairlift    Nutrition:   Current diet is Diabetic and No Added Salt  Medications:   Patient is not currently taking any over-the-counter supplements  Patient is able to manage medications  Activities of Daily Living (ADLs)/Instrumental Activities of Daily Living (IADLs):   Walk and transfer into and out of bed and chair?: Yes  Dress and groom yourself?: Yes    Bathe or shower yourself?: Yes    Feed yourself? Yes  Do your laundry/housekeeping?: Yes  Manage your money, pay your bills and track your expenses?: Yes  Make your own meals?: Yes    Do your own shopping?: Yes    Durable Medical Equipment Suppliers  patient does not know    Previous Hospitalizations:   Any hospitalizations or ED visits within the last 12 months?: No      Advance Care Planning:   Living will: Yes    Durable POA for healthcare: Yes    Advanced directive: Yes    Advanced directive counseling given: No      PREVENTIVE SCREENINGS      Cardiovascular Screening:    General: Screening Not Indicated and History Lipid Disorder      Diabetes Screening:     General: Screening Not Indicated and History Diabetes      Colorectal Cancer Screening:     General: Screening Not Indicated      Breast Cancer Screening:     General: Screening Not Indicated      Cervical Cancer Screening:    General: Screening Not Indicated      Osteoporosis Screening:    General: Screening Not Indicated and History Osteoporosis      Abdominal Aortic Aneurysm (AAA) Screening:        General: History AAA      Lung Cancer Screening:     General: Screening Not Indicated and History Lung Cancer      Hepatitis C Screening:    General: Screening Not Indicated    Other Counseling Topics:   Car/seat belt/driving safety, sunscreen and calcium and vitamin D intake and regular weightbearing exercise  Immunizations discussed      Olivia Guevara DO  BMI Counseling: Body mass index is 30 61 kg/m²  The BMI is above normal  Nutrition recommendations include reducing portion sizes, decreasing overall calorie intake and moderation in carbohydrate intake  Exercise recommendations include exercising 3-5 times per week

## 2019-12-09 NOTE — ASSESSMENT & PLAN NOTE
DM2 with microalbuminuria and CKD 3 - A1c stable at 7 1 on basaglar 34units daily  Had one reported hypoglycemic episode, continue to monitor on same regimen  On ACEI

## 2019-12-09 NOTE — ASSESSMENT & PLAN NOTE
Fair control  Continue on amlodipine 5mg daily, hctz 12 5mg daily and lisinopril 10mg daily  Encouraged weight loss

## 2019-12-09 NOTE — PATIENT INSTRUCTIONS
Medicare Preventive Visit Patient Instructions  Thank you for completing your Welcome to Medicare Visit or Medicare Annual Wellness Visit today  Your next wellness visit will be due in one year (12/9/2020)  The screening/preventive services that you may require over the next 5-10 years are detailed below  Some tests may not apply to you based off risk factors and/or age  Screening tests ordered at today's visit but not completed yet may show as past due  Also, please note that scanned in results may not display below  Preventive Screenings:  Service Recommendations Previous Testing/Comments   Colorectal Cancer Screening  * Colonoscopy    * Fecal Occult Blood Test (FOBT)/Fecal Immunochemical Test (FIT)  * Fecal DNA/Cologuard Test  * Flexible Sigmoidoscopy Age: 54-65 years old   Colonoscopy: every 10 years (may be performed more frequently if at higher risk)  OR  FOBT/FIT: every 1 year  OR  Cologuard: every 3 years  OR  Sigmoidoscopy: every 5 years  Screening may be recommended earlier than age 48 if at higher risk for colorectal cancer  Also, an individualized decision between you and your healthcare provider will decide whether screening between the ages of 74-80 would be appropriate  Colonoscopy: Not on file  FOBT/FIT: Not on file  Cologuard: Not on file  Sigmoidoscopy: Not on file         Breast Cancer Screening Age: 36 years old  Frequency: every 1-2 years  Not required if history of left and right mastectomy Mammogram: Not on file       Cervical Cancer Screening Between the ages of 21-29, pap smear recommended once every 3 years  Between the ages of 33-67, can perform pap smear with HPV co-testing every 5 years     Recommendations may differ for women with a history of total hysterectomy, cervical cancer, or abnormal pap smears in past  Pap Smear: Not on file       Hepatitis C Screening Once for adults born between [facility]  More frequently in patients at high risk for Hepatitis C Hep C Antibody: Not on file       Diabetes Screening 1-2 times per year if you're at risk for diabetes or have pre-diabetes Fasting glucose: 85 mg/dL   A1C: 7 1 %       Cholesterol Screening Once every 5 years if you don't have a lipid disorder  May order more often based on risk factors  Lipid panel: 12/04/2019         Other Preventive Screenings Covered by Medicare:  1  Abdominal Aortic Aneurysm (AAA) Screening: covered once if your at risk  You're considered to be at risk if you have a family history of AAA  2  Lung Cancer Screening: covers low dose CT scan once per year if you meet all of the following conditions: (1) Age 50-69; (2) No signs or symptoms of lung cancer; (3) Current smoker or have quit smoking within the last 15 years; (4) You have a tobacco smoking history of at least 30 pack years (packs per day multiplied by number of years you smoked); (5) You get a written order from a healthcare provider  3  Glaucoma Screening: covered annually if you're considered high risk: (1) You have diabetes OR (2) Family history of glaucoma OR (3)  aged 48 and older OR (3)  American aged 72 and older  3  Osteoporosis Screening: covered every 2 years if you meet one of the following conditions: (1) You're estrogen deficient and at risk for osteoporosis based off medical history and other findings; (2) Have a vertebral abnormality; (3) On glucocorticoid therapy for more than 3 months; (4) Have primary hyperparathyroidism; (5) On osteoporosis medications and need to assess response to drug therapy  · Last bone density test (DXA Scan): 02/25/2019   5  HIV Screening: covered annually if you're between the age of 15-65  Also covered annually if you are younger than 13 and older than 72 with risk factors for HIV infection  For pregnant patients, it is covered up to 3 times per pregnancy      Immunizations:  Immunization Recommendations   Influenza Vaccine Annual influenza vaccination during flu season is recommended for all persons aged >= 6 months who do not have contraindications   Pneumococcal Vaccine (Prevnar and Pneumovax)  * Prevnar = PCV13  * Pneumovax = PPSV23   Adults 25-60 years old: 1-3 doses may be recommended based on certain risk factors  Adults 72 years old: Prevnar (PCV13) vaccine recommended followed by Pneumovax (PPSV23) vaccine  If already received PPSV23 since turning 65, then PCV13 recommended at least one year after PPSV23 dose  Hepatitis B Vaccine 3 dose series if at intermediate or high risk (ex: diabetes, end stage renal disease, liver disease)   Tetanus (Td) Vaccine - COST NOT COVERED BY MEDICARE PART B Following completion of primary series, a booster dose should be given every 10 years to maintain immunity against tetanus  Td may also be given as tetanus wound prophylaxis  Tdap Vaccine - COST NOT COVERED BY MEDICARE PART B Recommended at least once for all adults  For pregnant patients, recommended with each pregnancy  Shingles Vaccine (Shingrix) - COST NOT COVERED BY MEDICARE PART B  2 shot series recommended in those aged 48 and above     Health Maintenance Due:  There are no preventive care reminders to display for this patient  Immunizations Due:      Topic Date Due    Hepatitis B Vaccine (1 of 3 - Risk 3-dose series) 11/06/1950    Pneumococcal Vaccine: 65+ Years (2 of 2 - PPSV23) 12/01/2015     Advance Directives   What are advance directives? Advance directives are legal documents that state your wishes and plans for medical care  These plans are made ahead of time in case you lose your ability to make decisions for yourself  Advance directives can apply to any medical decision, such as the treatments you want, and if you want to donate organs  What are the types of advance directives? There are many types of advance directives, and each state has rules about how to use them  You may choose a combination of any of the following:  · Living will:   This is a written record of the treatment you want  You can also choose which treatments you do not want, which to limit, and which to stop at a certain time  This includes surgery, medicine, IV fluid, and tube feedings  · Durable power of  for healthcare [facility]): This is a written record that states who you want to make healthcare choices for you when you are unable to make them for yourself  This person, called a proxy, is usually a family member or a friend  You may choose more than 1 proxy  · Do not resuscitate (DNR) order:  A DNR order is used in case your heart stops beating or you stop breathing  It is a request not to have certain forms of treatment, such as CPR  A DNR order may be included in other types of advance directives  · Medical directive: This covers the care that you want if you are in a coma, near death, or unable to make decisions for yourself  You can list the treatments you want for each condition  Treatment may include pain medicine, surgery, blood transfusions, dialysis, IV or tube feedings, and a ventilator (breathing machine)  · Values history: This document has questions about your views, beliefs, and how you feel and think about life  This information can help others choose the care that you would choose  Why are advance directives important? An advance directive helps you control your care  Although spoken wishes may be used, it is better to have your wishes written down  Spoken wishes can be misunderstood, or not followed  Treatments may be given even if you do not want them  An advance directive may make it easier for your family to make difficult choices about your care  Weight Management   Why it is important to manage your weight:  Being overweight increases your risk of health conditions such as heart disease, high blood pressure, type 2 diabetes, and certain types of cancer  It can also increase your risk for osteoarthritis, sleep apnea, and other respiratory problems   Aim for a slow, steady weight loss  Even a small amount of weight loss can lower your risk of health problems  How to lose weight safely:  A safe and healthy way to lose weight is to eat fewer calories and get regular exercise  You can lose up about 1 pound a week by decreasing the number of calories you eat by 500 calories each day  Healthy meal plan for weight management:  A healthy meal plan includes a variety of foods, contains fewer calories, and helps you stay healthy  A healthy meal plan includes the following:  · Eat whole-grain foods more often  A healthy meal plan should contain fiber  Fiber is the part of grains, fruits, and vegetables that is not broken down by your body  Whole-grain foods are healthy and provide extra fiber in your diet  Some examples of whole-grain foods are whole-wheat breads and pastas, oatmeal, brown rice, and bulgur  · Eat a variety of vegetables every day  Include dark, leafy greens such as spinach, kale, [place] greens, and mustard greens  Eat yellow and orange vegetables such as carrots, sweet potatoes, and winter squash  · Eat a variety of fruits every day  Choose fresh or canned fruit (canned in its own juice or light syrup) instead of juice  Fruit juice has very little or no fiber  · Eat low-fat dairy foods  Drink fat-free (skim) milk or 1% milk  Eat fat-free yogurt and low-fat cottage cheese  Try low-fat cheeses such as mozzarella and other reduced-fat cheeses  · Choose meat and other protein foods that are low in fat  Choose beans or other legumes such as split peas or lentils  Choose fish, skinless poultry (chicken or turkey), or lean cuts of red meat (beef or pork)  Before you cook meat or poultry, cut off any visible fat  · Use less fat and oil  Try baking foods instead of frying them  Add less fat, such as margarine, sour cream, regular salad dressing and mayonnaise to foods  Eat fewer high-fat foods   Some examples of high-fat foods include french fries, doughnuts, ice cream, and cakes  · Eat fewer sweets  Limit foods and drinks that are high in sugar  This includes candy, cookies, regular soda, and sweetened drinks  Exercise:  Exercise at least 30 minutes per day on most days of the week  Some examples of exercise include walking, biking, dancing, and swimming  You can also fit in more physical activity by taking the stairs instead of the elevator or parking farther away from stores  Ask your healthcare provider about the best exercise plan for you  © Copyright [employer] 2018 Information is for End User's use only and may not be sold, redistributed or otherwise used for commercial purposes  All illustrations and images included in CareNotes® are the copyrighted property of [employer]  or [facility] Preventive Visit Patient Instructions  Thank you for completing your Welcome to Medicare Visit or Medicare Annual Wellness Visit today  Your next wellness visit will be due in one year (12/9/2020)  The screening/preventive services that you may require over the next 5-10 years are detailed below  Some tests may not apply to you based off risk factors and/or age  Screening tests ordered at today's visit but not completed yet may show as past due  Also, please note that scanned in results may not display below  Preventive Screenings:  Service Recommendations Previous Testing/Comments   Colorectal Cancer Screening  * Colonoscopy    * Fecal Occult Blood Test (FOBT)/Fecal Immunochemical Test (FIT)  * Fecal DNA/Cologuard Test  * Flexible Sigmoidoscopy Age: 54-65 years old   Colonoscopy: every 10 years (may be performed more frequently if at higher risk)  OR  FOBT/FIT: every 1 year  OR  Cologuard: every 3 years  OR  Sigmoidoscopy: every 5 years  Screening may be recommended earlier than age 48 if at higher risk for colorectal cancer   Also, an individualized decision between you and your healthcare provider will decide whether screening between the ages of 74-80 would be appropriate  Colonoscopy: Not on file  FOBT/FIT: Not on file  Cologuard: Not on file  Sigmoidoscopy: Not on file    Screening Not Indicated     Breast Cancer Screening Age: 36 years old  Frequency: every 1-2 years  Not required if history of left and right mastectomy Mammogram: Not on file       Cervical Cancer Screening Between the ages of 21-29, pap smear recommended once every 3 years  Between the ages of 33-67, can perform pap smear with HPV co-testing every 5 years  Recommendations may differ for women with a history of total hysterectomy, cervical cancer, or abnormal pap smears in past  Pap Smear: Not on file    Screening Not Indicated   Hepatitis C Screening Once for adults born between 1945 and 1965  More frequently in patients at high risk for Hepatitis C Hep C Antibody: Not on file       Diabetes Screening 1-2 times per year if you're at risk for diabetes or have pre-diabetes Fasting glucose: 85 mg/dL   A1C: 7 1 %    Screening Not Indicated  History Diabetes   Cholesterol Screening Once every 5 years if you don't have a lipid disorder  May order more often based on risk factors  Lipid panel: 12/04/2019    Screening Not Indicated  History Lipid Disorder     Other Preventive Screenings Covered by Medicare:  6  Abdominal Aortic Aneurysm (AAA) Screening: covered once if your at risk  You're considered to be at risk if you have a family history of AAA  7  Lung Cancer Screening: covers low dose CT scan once per year if you meet all of the following conditions: (1) Age 50-69; (2) No signs or symptoms of lung cancer; (3) Current smoker or have quit smoking within the last 15 years; (4) You have a tobacco smoking history of at least 30 pack years (packs per day multiplied by number of years you smoked); (5) You get a written order from a healthcare provider    8  Glaucoma Screening: covered annually if you're considered high risk: (1) You have diabetes OR (2) Family history of glaucoma OR (3)  American aged 48 and older OR (3)  American aged 72 and older  5  Osteoporosis Screening: covered every 2 years if you meet one of the following conditions: (1) You're estrogen deficient and at risk for osteoporosis based off medical history and other findings; (2) Have a vertebral abnormality; (3) On glucocorticoid therapy for more than 3 months; (4) Have primary hyperparathyroidism; (5) On osteoporosis medications and need to assess response to drug therapy  · Last bone density test (DXA Scan): 02/25/2019   10  HIV Screening: covered annually if you're between the age of 15-65  Also covered annually if you are younger than 13 and older than 72 with risk factors for HIV infection  For pregnant patients, it is covered up to 3 times per pregnancy  Immunizations:  Immunization Recommendations   Influenza Vaccine Annual influenza vaccination during flu season is recommended for all persons aged >= 6 months who do not have contraindications   Pneumococcal Vaccine (Prevnar and Pneumovax)  * Prevnar = PCV13  * Pneumovax = PPSV23   Adults 25-60 years old: 1-3 doses may be recommended based on certain risk factors  Adults 72 years old: Prevnar (PCV13) vaccine recommended followed by Pneumovax (PPSV23) vaccine  If already received PPSV23 since turning 65, then PCV13 recommended at least one year after PPSV23 dose  Hepatitis B Vaccine 3 dose series if at intermediate or high risk (ex: diabetes, end stage renal disease, liver disease)   Tetanus (Td) Vaccine - COST NOT COVERED BY MEDICARE PART B Following completion of primary series, a booster dose should be given every 10 years to maintain immunity against tetanus  Td may also be given as tetanus wound prophylaxis  Tdap Vaccine - COST NOT COVERED BY MEDICARE PART B Recommended at least once for all adults  For pregnant patients, recommended with each pregnancy     Shingles Vaccine (Shingrix) - COST NOT COVERED BY MEDICARE PART B  2 shot series recommended in those aged 48 and above     Health Maintenance Due:  There are no preventive care reminders to display for this patient  Immunizations Due:      Topic Date Due    Hepatitis B Vaccine (1 of 3 - Risk 3-dose series) 11/06/1950    Pneumococcal Vaccine: 65+ Years (2 of 2 - PPSV23) 12/01/2015     Advance Directives   What are advance directives? Advance directives are legal documents that state your wishes and plans for medical care  These plans are made ahead of time in case you lose your ability to make decisions for yourself  Advance directives can apply to any medical decision, such as the treatments you want, and if you want to donate organs  What are the types of advance directives? There are many types of advance directives, and each state has rules about how to use them  You may choose a combination of any of the following:  · Living will: This is a written record of the treatment you want  You can also choose which treatments you do not want, which to limit, and which to stop at a certain time  This includes surgery, medicine, IV fluid, and tube feedings  · Durable power of  for healthcare [facility] [place]): This is a written record that states who you want to make healthcare choices for you when you are unable to make them for yourself  This person, called a proxy, is usually a family member or a friend  You may choose more than 1 proxy  · Do not resuscitate (DNR) order:  A DNR order is used in case your heart stops beating or you stop breathing  It is a request not to have certain forms of treatment, such as CPR  A DNR order may be included in other types of advance directives  · Medical directive: This covers the care that you want if you are in a coma, near death, or unable to make decisions for yourself  You can list the treatments you want for each condition   Treatment may include pain medicine, surgery, blood transfusions, dialysis, IV or tube feedings, and a ventilator (breathing machine)  · Values history: This document has questions about your views, beliefs, and how you feel and think about life  This information can help others choose the care that you would choose  Why are advance directives important? An advance directive helps you control your care  Although spoken wishes may be used, it is better to have your wishes written down  Spoken wishes can be misunderstood, or not followed  Treatments may be given even if you do not want them  An advance directive may make it easier for your family to make difficult choices about your care  Weight Management   Why it is important to manage your weight:  Being overweight increases your risk of health conditions such as heart disease, high blood pressure, type 2 diabetes, and certain types of cancer  It can also increase your risk for osteoarthritis, sleep apnea, and other respiratory problems  Aim for a slow, steady weight loss  Even a small amount of weight loss can lower your risk of health problems  How to lose weight safely:  A safe and healthy way to lose weight is to eat fewer calories and get regular exercise  You can lose up about 1 pound a week by decreasing the number of calories you eat by 500 calories each day  Healthy meal plan for weight management:  A healthy meal plan includes a variety of foods, contains fewer calories, and helps you stay healthy  A healthy meal plan includes the following:  · Eat whole-grain foods more often  A healthy meal plan should contain fiber  Fiber is the part of grains, fruits, and vegetables that is not broken down by your body  Whole-grain foods are healthy and provide extra fiber in your diet  Some examples of whole-grain foods are whole-wheat breads and pastas, oatmeal, brown rice, and bulgur  · Eat a variety of vegetables every day  Include dark, leafy greens such as spinach, kale, [place] greens, and mustard greens   Eat yellow and orange vegetables such as carrots, sweet potatoes, and winter squash  · Eat a variety of fruits every day  Choose fresh or canned fruit (canned in its own juice or light syrup) instead of juice  Fruit juice has very little or no fiber  · Eat low-fat dairy foods  Drink fat-free (skim) milk or 1% milk  Eat fat-free yogurt and low-fat cottage cheese  Try low-fat cheeses such as mozzarella and other reduced-fat cheeses  · Choose meat and other protein foods that are low in fat  Choose beans or other legumes such as split peas or lentils  Choose fish, skinless poultry (chicken or turkey), or lean cuts of red meat (beef or pork)  Before you cook meat or poultry, cut off any visible fat  · Use less fat and oil  Try baking foods instead of frying them  Add less fat, such as margarine, sour cream, regular salad dressing and mayonnaise to foods  Eat fewer high-fat foods  Some examples of high-fat foods include french fries, doughnuts, ice cream, and cakes  · Eat fewer sweets  Limit foods and drinks that are high in sugar  This includes candy, cookies, regular soda, and sweetened drinks  Exercise:  Exercise at least 30 minutes per day on most days of the week  Some examples of exercise include walking, biking, dancing, and swimming  You can also fit in more physical activity by taking the stairs instead of the elevator or parking farther away from stores  Ask your healthcare provider about the best exercise plan for you  © Copyright [employer] 2018 Information is for End User's use only and may not be sold, redistributed or otherwise used for commercial purposes  All illustrations and images included in CareNotes® are the copyrighted property of [employer] A M [employer]  or [facility] Destiny Ansari     Obesity   AMBULATORY CARE:   Obesity  is when your body mass index (BMI) is greater than 30  Your healthcare provider will use your height and weight to measure your BMI  The risks of obesity include  many health problems, such as injuries or physical disability   You may need tests to check for the following:  · Diabetes     · High blood pressure or high cholesterol     · Heart disease     · Gallbladder or liver disease     · Cancer of the colon, breast, prostate, liver, or kidney     · Sleep apnea     · Arthritis or gout  Seek care immediately if:   · You have a severe headache, confusion, or difficulty speaking  · You have weakness on one side of your body  · You have chest pain, sweating, or shortness of breath  Contact your healthcare provider if:   · You have symptoms of gallbladder or liver disease, such as pain in your upper abdomen  · You have knee or hip pain and discomfort while walking  · You have symptoms of diabetes, such as intense hunger and thirst, and frequent urination  · You have symptoms of sleep apnea, such as snoring or daytime sleepiness  · You have questions or concerns about your condition or care  Treatment for obesity  focuses on helping you lose weight to improve your health  Even a small decrease in BMI can reduce the risk for many health problems  Your healthcare provider will help you set a weight-loss goal   · Lifestyle changes  are the first step in treating obesity  These include making healthy food choices and getting regular physical activity  Your healthcare provider may suggest a weight-loss program that involves coaching, education, and therapy  · Medicine  may help you lose weight when it is used with a healthy diet and physical activity  · Surgery  can help you lose weight if you are very obese and have other health problems  There are several types of weight-loss surgery  Ask your healthcare provider for more information  Be successful losing weight:   · Set small, realistic goals  An example of a small goal is to walk for 20 minutes 5 days a week  Anther goal is to lose 5% of your body weight  · Tell friends, family members, and coworkers about your goals  and ask for their support   Ask a friend to lose weight with you, or join a weight-loss support group  · Identify foods or triggers that may cause you to overeat , and find ways to avoid them  Remove tempting high-calorie foods from your home and workplace  Place a bowl of fresh fruit on your kitchen counter  If stress causes you to eat, then find other ways to cope with stress  · Keep a diary to track what you eat and drink  Also write down how many minutes of physical activity you do each day  Weigh yourself once a week and record it in your diary  Eating changes: You will need to eat 500 to 1,000 fewer calories each day than you currently eat to lose 1 to 2 pounds a week  The following changes will help you cut calories:  · Eat smaller portions  Use small plates, no larger than 9 inches in diameter  Fill your plate half full of fruits and vegetables  Measure your food using measuring cups until you know what a serving size looks like  · Eat 3 meals and 1 or 2 snacks each day  Plan your meals in advance  [place] [employer] and eat at home most of the time  Eat slowly  · Eat fruits and vegetables at every meal   They are low in calories and high in fiber, which makes you feel full  Do not add butter, margarine, or cream sauce to vegetables  Use herbs to season steamed vegetables  · Eat less fat and fewer fried foods  Eat more baked or grilled chicken and fish  These protein sources are lower in calories and fat than red meat  Limit fast food  Dress your salads with olive oil and vinegar instead of bottled dressing  · Limit the amount of sugar you eat  Do not drink sugary beverages  Limit alcohol  Activity changes:  Physical activity is good for your body in many ways  It helps you burn calories and build strong muscles  It decreases stress and depression, and improves your mood  It can also help you sleep better  Talk to your healthcare provider before you begin an exercise program   · Exercise for at least 30 minutes 5 days a week    Start slowly  Set aside time each day for physical activity that you enjoy and that is convenient for you  It is best to do both weight training and an activity that increases your heart rate, such as walking, bicycling, or swimming  · Find ways to be more active  Do yard work and housecleaning  Walk up the stairs instead of using elevators  Spend your leisure time going to events that require walking, such as outdoor festivals or fairs  This extra physical activity can help you lose weight and keep it off  Follow up with your healthcare provider as directed: You may need to meet with a dietitian  Write down your questions so you remember to ask them during your visits  © 2017 [address] Information is for End User's use only and may not be sold, redistributed or otherwise used for commercial purposes  All illustrations and images included in CareNotes® are the copyrighted property of [employer]  or Aaron Rubio  The above information is an  only  It is not intended as medical advice for individual conditions or treatments  Talk to your doctor, nurse or pharmacist before following any medical regimen to see if it is safe and effective for you  Low Fat Diet   AMBULATORY CARE:   A low-fat diet  is an eating plan that is low in total fat, unhealthy fat, and cholesterol  You may need to follow a low-fat diet if you have trouble digesting or absorbing fat  You may also need to follow this diet if you have high cholesterol  You can also lower your cholesterol by increasing the amount of fiber in your diet  Soluble fiber is a type of fiber that helps to decrease cholesterol levels  Different types of fat in food:   · Limit unhealthy fats  A diet that is high in cholesterol, saturated fat, and trans fat may cause unhealthy cholesterol levels  Unhealthy cholesterol levels increase your risk of heart disease      ¨ Cholesterol:  Limit intake of cholesterol to less than 200 mg per day  Cholesterol is found in meat, eggs, and dairy  ¨ Saturated fat:  Limit saturated fat to less than 7% of your total daily calories  Ask your dietitian how many calories you need each day  Saturated fat is found in butter, cheese, ice cream, whole milk, and palm oil  Saturated fat is also found in meat, such as beef, pork, chicken skin, and processed meats  Processed meats include sausage, hot dogs, and bologna  ¨ Trans fat:  Avoid trans fat as much as possible  Trans fat is used in fried and baked foods  Foods that say trans fat free on the label may still have up to 0 5 grams of trans fat per serving  · Include healthy fats  Replace foods that are high in saturated and trans fat with foods high in healthy fats  This may help to decrease high cholesterol levels  ¨ Monounsaturated fats: These are found in avocados, nuts, and vegetable oils, such as olive, canola, and sunflower oil  ¨ Polyunsaturated fats: These can be found in vegetable oils, such as soybean or corn oil  Omega-3 fats can help to decrease the risk of heart disease  Omega-3 fats are found in fish, such as salmon, herring, trout, and tuna  Omega-3 fats can also be found in plant foods, such as walnuts, flaxseed, soybeans, and canola oil    Foods to limit or avoid:   · Grains:      ¨ Snacks that are made with partially hydrogenated oils, such as chips, regular crackers, and butter-flavored popcorn    ¨ High-fat baked goods, such as biscuits, croissants, doughnuts, pies, cookies, and pastries    · Dairy:      ¨ Whole milk, 2% milk, and yogurt and ice cream made with whole milk    ¨ Half and half creamer, heavy cream, and whipping cream    ¨ Cheese, cream cheese, and sour cream    · Meats and proteins:      ¨ High-fat cuts of meat (T-bone steak, regular hamburger, and ribs)    ¨ Fried meat, poultry (turkey and chicken), and fish    ¨ Poultry (chicken and turkey) with skin    ¨ Cold cuts (salami or bologna), hot dogs, vidales, and sausage    ¨ Whole eggs and egg yolks    · Vegetables and fruits with added fat:      ¨ Fried vegetables or vegetables in butter or high-fat sauces, such as cream or cheese sauces    ¨ Fried fruit or fruit served with butter or cream    · Fats:      ¨ Butter, stick margarine, and shortening    ¨ Coconut, palm oil, and palm kernel oil  Foods to include:   · Grains:      ¨ Whole-grain breads, cereals, pasta, and brown rice    ¨ Low-fat crackers and pretzels    · Vegetables and fruits:      ¨ Fresh, frozen, or canned vegetables (no salt or low-sodium)    ¨ Fresh, frozen, dried, or canned fruit (canned in light syrup or fruit juice)    ¨ Avocado    · Low-fat dairy products:      ¨ Nonfat (skim) or 1% milk    ¨ Nonfat or low-fat cheese, yogurt, and cottage cheese    · Meats and proteins:      ¨ Chicken or turkey with no skin    ¨ Baked or broiled fish    ¨ Lean beef and pork (loin, round, extra lean hamburger)    ¨ Beans and peas, unsalted nuts, soy products    ¨ Egg whites and substitutes    ¨ Seeds and nuts    · Fats:      ¨ Unsaturated oil, such as canola, olive, peanut, soybean, or sunflower oil    ¨ Soft or liquid margarine and vegetable oil spread    ¨ Low-fat salad dressing  Other ways to decrease fat:   · Read food labels before you buy foods  Choose foods that have less than 30% of calories from fat  Choose low-fat or fat-free dairy products  Remember that fat free does not mean calorie free  These foods still contain calories, and too many calories can lead to weight gain  · Trim fat from meat and avoid fried food  Trim all visible fat from meat before you cook it  Remove the skin from poultry  Do not keating meat, fish, or poultry  Bake, roast, boil, or broil these foods instead  Avoid fried foods  Eat a baked potato instead of [place] fries  Steam vegetables instead of sautéing them in butter  · Add less fat to foods    Use imitation vidales bits on salads and baked potatoes instead of regular vidales bits  Use fat-free or low-fat salad dressings instead of regular dressings  Use low-fat or nonfat butter-flavored topping instead of regular butter or margarine on popcorn and other foods  Ways to decrease fat in recipes:  Replace high-fat ingredients with low-fat or nonfat ones  This may cause baked goods to be drier than usual  You may need to use nonfat cooking spray on pans to prevent food from sticking  You also may need to change the amount of other ingredients, such as water, in the recipe  Try the following:  · Use low-fat or light margarine instead of regular margarine or shortening  · Use lean ground turkey breast or chicken, or lean ground beef (less than 5% fat) instead of hamburger  · Add 1 teaspoon of canola oil to 8 ounces of skim milk instead of using cream or half and half  · Use grated zucchini, carrots, or apples in breads instead of coconut  · Use blenderized, low-fat cottage cheese, plain tofu, or low-fat ricotta cheese instead of cream cheese  · Use 1 egg white and 1 teaspoon of canola oil, or use ¼ cup (2 ounces) of fat-free egg substitute instead of a whole egg  · Replace half of the oil that is called for in a recipe with applesauce when you bake  Use 3 tablespoons of cocoa powder and 1 tablespoon of canola oil instead of a square of baking chocolate  How to increase fiber:  Eat enough high-fiber foods to get 20 to 30 grams of fiber every day  Slowly increase your fiber intake to avoid stomach cramps, gas, and other problems  · Eat 3 ounces of whole-grain foods each day  An ounce is about 1 slice of bread  Eat whole-grain breads, such as whole-wheat bread  Whole wheat, whole-wheat flour, or other whole grains should be listed as the first ingredient on the food label  Replace white flour with whole-grain flour or use half of each in recipes  Whole-grain flour is heavier than white flour, so you may have to add more yeast or baking powder       · Eat a high-fiber cereal for breakfast   Oatmeal is a good source of soluble fiber  Look for cereals that have bran or fiber in the name  Choose whole-grain products, such as brown rice, barley, and whole-wheat pasta  · Eat more beans, peas, and lentils  For example, add beans to soups or salads  Eat at least 5 cups of fruits and vegetables each day  Eat fruits and vegetables with the peel because the peel is high in fiber  © 2017 [address] Information is for End User's use only and may not be sold, redistributed or otherwise used for commercial purposes  All illustrations and images included in CareNotes® are the copyrighted property of [employer]  or Aaron Rubio  The above information is an  only  It is not intended as medical advice for individual conditions or treatments  Talk to your doctor, nurse or pharmacist before following any medical regimen to see if it is safe and effective for you  Heart Healthy Diet   AMBULATORY CARE:   A heart healthy diet  is an eating plan low in total fat, unhealthy fats, and sodium (salt)  A heart healthy diet helps decrease your risk for heart disease and stroke  Limit the amount of fat you eat to 25% to 35% of your total daily calories  Limit sodium to less than 2,300 mg each day  Healthy fats:  Healthy fats can help improve cholesterol levels  The risk for heart disease is decreased when cholesterol levels are normal  Choose healthy fats, such as the following:  · Unsaturated fat  is found in foods such as soybean, canola, olive, corn, and safflower oils  It is also found in soft tub margarine that is made with liquid vegetable oil  · Omega-3 fat  is found in certain fish, such as salmon, tuna, and trout, and in walnuts and flaxseed  Unhealthy fats:  Unhealthy fats can cause unhealthy cholesterol levels in your blood and increase your risk of heart disease   Limit unhealthy fats, such as the following:  · Cholesterol  is found in animal foods, such as eggs and lobster, and in dairy products made from whole milk  Limit cholesterol to less than 300 milligrams (mg) each day  You may need to limit cholesterol to 200 mg each day if you have heart disease  · Saturated fat  is found in meats, such as vidales and hamburger  It is also found in chicken or turkey skin, whole milk, and butter  Limit saturated fat to less than 7% of your total daily calories  Limit saturated fat to less than 6% if you have heart disease or are at increased risk for it  · Trans fat  is found in packaged foods, such as potato chips and cookies  It is also in hard margarine, some fried foods, and shortening  Avoid trans fats as much as possible    Heart healthy foods and drinks to include:  Ask your dietitian or healthcare provider how many servings to have from each of the following food groups:  · Grains:      ¨ Whole-wheat breads, cereals, and pastas, and brown rice    ¨ Low-fat, low-sodium crackers and chips    · Vegetables:      ¨ Broccoli, green beans, green peas, and spinach    ¨ Collards, kale, and lima beans    ¨ Carrots, sweet potatoes, tomatoes, and peppers    ¨ Canned vegetables with no salt added    · Fruits:      ¨ Bananas, peaches, pears, and pineapple    ¨ Grapes, raisins, and dates    ¨ Oranges, tangerines, grapefruit, orange juice, and grapefruit juice    ¨ Apricots, mangoes, melons, and papaya    ¨ Raspberries and strawberries    ¨ Canned fruit with no added sugar    · Low-fat dairy products:      ¨ Nonfat (skim) milk, 1% milk, and low-fat almond, cashew, or soy milks fortified with calcium    ¨ Low-fat cheese, regular or frozen yogurt, and cottage cheese    · Meats and proteins , such as lean cuts of beef and pork (loin, leg, round), skinless chicken and turkey, legumes, soy products, egg whites, and nuts  Foods and drinks to limit or avoid:  Ask your dietitian or healthcare provider about these and other foods that are high in unhealthy fat, sodium, and sugar:  · Snack or packaged foods , such as frozen dinners, cookies, macaroni and cheese, and cereals with more than 300 mg of sodium per serving    · Canned or dry mixes  for cakes, soups, sauces, or gravies    · Vegetables with added sodium , such as instant potatoes, vegetables with added sauces, or regular canned vegetables    · Other foods high in sodium , such as ketchup, barbecue sauce, salad dressing, pickles, olives, soy sauce, and miso    · High-fat dairy foods  such as whole or 2% milk, cream cheese, or sour cream, and cheeses     · High-fat protein foods  such as high-fat cuts of beef (T-bone steaks, ribs), chicken or turkey with skin, and organ meats, such as liver    · Cured or smoked meats , such as hot dogs, vidales, and sausage    · Unhealthy fats and oils , such as butter, stick margarine, shortening, and cooking oils such as coconut or palm oil    · Food and drinks high in sugar , such as soft drinks (soda), sports drinks, sweetened tea, candy, cake, cookies, pies, and doughnuts  Other diet guidelines to follow:   · Eat more foods containing omega-3 fats  Eat fish high in omega-3 fats at least 2 times a week  · Limit alcohol  Too much alcohol can damage your heart and raise your blood pressure  Women should limit alcohol to 1 drink a day  Men should limit alcohol to 2 drinks a day  A drink of alcohol is 12 ounces of beer, 5 ounces of wine, or 1½ ounces of liquor  · Choose low-sodium foods  High-sodium foods can lead to high blood pressure  Add little or no salt to food you prepare  Use herbs and spices in place of salt  · Eat more fiber  to help lower cholesterol levels  Eat at least 5 servings of fruits and vegetables each day  Eat 3 ounces of whole-grain foods each day  Legumes (beans) are also a good source of fiber  Lifestyle guidelines:   · Do not smoke  Nicotine and other chemicals in cigarettes and cigars can cause lung and heart damage   Ask your healthcare provider for information if you currently smoke and need help to quit  E-cigarettes or smokeless tobacco still contain nicotine  Talk to your healthcare provider before you use these products  · Exercise regularly  to help you maintain a healthy weight and improve your blood pressure and cholesterol levels  Ask your healthcare provider about the best exercise plan for you  Do not start an exercise program without asking your healthcare provider  Follow up with your healthcare provider as directed:  Write down your questions so you remember to ask them during your visits  © 2017 [address] [facility] Information is for End User's use only and may not be sold, redistributed or otherwise used for commercial purposes  All illustrations and images included in CareNotes® are the copyrighted property of [employer]  or Aaron Rubio  The above information is an  only  It is not intended as medical advice for individual conditions or treatments  Talk to your doctor, nurse or pharmacist before following any medical regimen to see if it is safe and effective for you  Calorie Counting Diet   WHAT YOU NEED TO KNOW:   What is a calorie counting diet? It is a meal plan based on counting calories each day to reach a healthy body weight  You will need to eat fewer calories if you are trying to lose weight  Weight loss may decrease your risk for certain health problems or improve your health if you have health problems  Some of these health problems include heart disease, high blood pressure, and diabetes  What foods should I avoid? Your dietitian will tell you if you need to avoid certain foods based on your body weight and health condition  You may need to avoid high-fat foods if you are at risk for or have heart disease  You may need to eat fewer foods from the breads and starches food group if you have diabetes  How many calories are in foods?   The following is a list of foods and drinks with the approximate number of calories in each  Check the food label to find the exact number of calories  A dietitian can tell you how many calories you should have from each food group each day    · Carbohydrate:      ¨ ½ of a 3-inch bagel, 1 slice of bread, or ½ of a hamburger bun or hot dog bun (80)    ¨ 1 (8-inch) flour tortilla or ½ cup of cooked rice (100)    ¨ 1 (6-inch) corn tortilla (80)    ¨ 1 (6-inch) pancake or 1 cup of bran flakes cereal (110)    ¨ ½ cup of cooked cereal (80)    ¨ ½ cup of cooked pasta (85)    ¨ 1 ounce of pretzels (100)    ¨ 3 cups of air-popped popcorn without butter or oil (80)    · Dairy:      ¨ 1 cup of skim or 1% milk (90)    ¨ 1 cup of 2% milk (120)    ¨ 1 cup of whole milk (160)    ¨ 1 cup of 2% chocolate milk (220)    ¨ 1 ounce of low-fat cheese with 3 grams of fat per ounce (70)    ¨ 1 ounce of cheddar cheese (114)    ¨ ½ cup of 1% fat cottage cheese (80)    ¨ 1 cup of plain or sugar-free, fat-free yogurt (90)    · Protein foods:      ¨ 3 ounces of fish (not breaded or fried) (95)    ¨ 3 ounces of breaded, fried fish (195)    ¨ ¾ cup of tuna canned in water (105)    ¨ 3 ounces of chicken breast without skin (105)    ¨ 1 fried chicken breast with skin (350)    ¨ ¼ cup of fat free egg substitute (40)    ¨ 1 large egg (75)    ¨ 3 ounces of lean beef or pork (165)    ¨ 3 ounces of fried pork chop or ham (185)    ¨ ½ cup of cooked dried beans, such as kidney, marcum, lentils, or navy (115)    ¨ 3 ounces of bologna or lunch meat (225)    ¨ 2 links of breakfast sausage (140)    · Vegetables:      ¨ ½ cup of sliced mushrooms (10)    ¨ 1 cup of salad greens, such as lettuce, spinach, or tevin (15)    ¨ ½ cup of steamed asparagus (20)    ¨ ½ cup of cooked summer squash, zucchini squash, or green or wax beans (25)    ¨ 1 cup of broccoli or cauliflower florets, or 1 medium tomato (25)    ¨ 1 large raw carrot or ½ cup of cooked carrots (40)    ¨ ? of a medium cucumber or 1 stalk of celery (5)    ¨ 1 small baked potato (160)    ¨ 1 cup of breaded, fried vegetables (230)    · Fruit:      ¨ 1 (6-inch) banana (55)     ¨ ½ of a 4-inch grapefruit (55)    ¨ 15 grapes (60)    ¨ 1 medium orange or apple (70)    ¨ 1 large peach (65)    ¨ 1 cup of fresh pineapple chunks (75)    ¨ 1 cup of melon cubes (50)    ¨ 1¼ cups of whole strawberries (45)    ¨ ½ cup of fruit canned in juice (55)    ¨ ½ cup of fruit canned in heavy syrup (110)    ¨ ?  cup of raisins (130)    ¨ ½ cup of unsweetened fruit juice (60)    ¨ ½ cup of grape, cranberry, or prune juice (90)    · Fat:      ¨ 10 peanuts or 2 teaspoons of peanut butter (55)    ¨ 2 tablespoons of avocado or 1 tablespoon of regular salad dressing (45)    ¨ 2 slices of vidales (90)    ¨ 1 teaspoon of oil, such as safflower, canola, corn, or olive oil (45)    ¨ 2 teaspoons of low-fat margarine, or 1 tablespoon of low-fat mayonnaise (50)    ¨ 1 teaspoon of regular margarine (40)    ¨ 1 tablespoon of regular mayonnaise (135)    ¨ 1 tablespoon of cream cheese or 2 tablespoons of low-fat cream cheese (45)    ¨ 2 tablespoons of vegetable shortening (215)    · Dessert and sweets:      ¨ 8 animal crackers or 5 vanilla wafers (80)    ¨ 1 frozen fruit juice bar (80)    ¨ ½ cup of ice milk or low-fat frozen yogurt (90)    ¨ ½ cup of sherbet or sorbet (125)    ¨ ½ cup of sugar-free pudding or custard (60)    ¨ ½ cup of ice cream (140)    ¨ ½ cup of pudding or custard (175)    ¨ 1 (2-inch) square chocolate brownie (185)    · Combination foods:      ¨ Bean burrito made with an 8-inch tortilla, without cheese (275)    ¨ Chicken breast sandwich with lettuce and tomato (325)    ¨ 1 cup of chicken noodle soup (60)    ¨ 1 beef taco (175)    ¨ Regular hamburger with lettuce and tomato (310)    ¨ Regular cheeseburger with lettuce and tomato (410)     ¨ ¼ of a 12-inch cheese pizza (280)    ¨ Fried fish sandwich with lettuce and tomato (425)    ¨ Hot dog and bun (275)    ¨ 1½ cups of macaroni and cheese (310)    ¨ Taco salad with a fried tortilla shell (870)    · Low-calorie foods:      ¨ 1 tablespoon of ketchup or 1 tablespoon of fat free sour cream (15)    ¨ 1 teaspoon of mustard (5)    ¨ ¼ cup of salsa (20)    ¨ 1 large dill pickle (15)    ¨ 1 tablespoon of fat free salad dressing (10)    ¨ 2 teaspoons of low-sugar, light jam or jelly, or 1 tablespoon of sugar-free syrup (15)    ¨ 1 sugar-free popsicle (15)    ¨ 1 cup of club soda, seltzer water, or diet soda (0)  CARE AGREEMENT:   You have the right to help plan your care  Discuss treatment options with your caregivers to decide what care you want to receive  You always have the right to refuse treatment  The above information is an  only  It is not intended as medical advice for individual conditions or treatments  Talk to your doctor, nurse or pharmacist before following any medical regimen to see if it is safe and effective for you  © 2017 [address] Information is for End User's use only and may not be sold, redistributed or otherwise used for commercial purposes  All illustrations and images included in CareNotes® are the copyrighted property of [employer]  or Aaron Rubio

## 2019-12-16 ENCOUNTER — APPOINTMENT (OUTPATIENT)
Dept: LAB | Facility: CLINIC | Age: 84
End: 2019-12-16
Payer: MEDICARE

## 2019-12-16 DIAGNOSIS — N18.30 CKD (CHRONIC KIDNEY DISEASE), STAGE III (HCC): ICD-10-CM

## 2019-12-16 LAB
ANION GAP SERPL CALCULATED.3IONS-SCNC: 7 MMOL/L (ref 4–13)
BUN SERPL-MCNC: 41 MG/DL (ref 5–25)
CALCIUM SERPL-MCNC: 10.1 MG/DL (ref 8.3–10.1)
CHLORIDE SERPL-SCNC: 106 MMOL/L (ref 100–108)
CO2 SERPL-SCNC: 27 MMOL/L (ref 21–32)
CREAT SERPL-MCNC: 1.59 MG/DL (ref 0.6–1.3)
GFR SERPL CREATININE-BSD FRML MDRD: 29 ML/MIN/1.73SQ M
GLUCOSE P FAST SERPL-MCNC: 122 MG/DL (ref 65–99)
POTASSIUM SERPL-SCNC: 5 MMOL/L (ref 3.5–5.3)
SODIUM SERPL-SCNC: 140 MMOL/L (ref 136–145)

## 2019-12-16 PROCEDURE — 80048 BASIC METABOLIC PNL TOTAL CA: CPT

## 2019-12-16 PROCEDURE — 36415 COLL VENOUS BLD VENIPUNCTURE: CPT

## 2020-01-31 DIAGNOSIS — I10 BENIGN ESSENTIAL HTN: ICD-10-CM

## 2020-01-31 RX ORDER — AMLODIPINE BESYLATE 5 MG/1
TABLET ORAL
Qty: 90 TABLET | Refills: 1 | Status: SHIPPED | OUTPATIENT
Start: 2020-01-31 | End: 2020-04-30 | Stop reason: SDUPTHER

## 2020-03-19 DIAGNOSIS — E78.2 MIXED HYPERLIPIDEMIA: ICD-10-CM

## 2020-03-19 DIAGNOSIS — I10 ESSENTIAL HYPERTENSION: Chronic | ICD-10-CM

## 2020-03-19 RX ORDER — LISINOPRIL 10 MG/1
TABLET ORAL
Qty: 90 TABLET | Refills: 1 | Status: SHIPPED | OUTPATIENT
Start: 2020-03-19 | End: 2020-07-16 | Stop reason: SDUPTHER

## 2020-03-19 RX ORDER — HYDROCHLOROTHIAZIDE 12.5 MG/1
TABLET ORAL
Qty: 90 TABLET | Refills: 1 | Status: SHIPPED | OUTPATIENT
Start: 2020-03-19 | End: 2020-04-30 | Stop reason: SDUPTHER

## 2020-03-19 RX ORDER — PRAVASTATIN SODIUM 20 MG
TABLET ORAL
Qty: 90 TABLET | Refills: 1 | Status: SHIPPED | OUTPATIENT
Start: 2020-03-19 | End: 2020-07-10 | Stop reason: SDUPTHER

## 2020-03-24 ENCOUNTER — TELEPHONE (OUTPATIENT)
Dept: CARDIAC SURGERY | Facility: CLINIC | Age: 85
End: 2020-03-24

## 2020-03-24 NOTE — TELEPHONE ENCOUNTER
Pt called stating she wants to cancel her 1 yr f/u w/ CT due to Bentley Biggs  Pt would like to call back when she feels comfortable to come back into the office  Pt has been cancelled and will also be put on our list of patients to call to reschedule

## 2020-04-30 DIAGNOSIS — I10 ESSENTIAL HYPERTENSION: Chronic | ICD-10-CM

## 2020-04-30 DIAGNOSIS — I10 BENIGN ESSENTIAL HTN: ICD-10-CM

## 2020-04-30 RX ORDER — AMLODIPINE BESYLATE 5 MG/1
5 TABLET ORAL DAILY
Qty: 90 TABLET | Refills: 1 | Status: SHIPPED | OUTPATIENT
Start: 2020-04-30 | End: 2020-09-25

## 2020-04-30 RX ORDER — HYDROCHLOROTHIAZIDE 12.5 MG/1
12.5 TABLET ORAL DAILY
Qty: 90 TABLET | Refills: 1 | Status: SHIPPED | OUTPATIENT
Start: 2020-04-30 | End: 2020-10-30

## 2020-05-05 ENCOUNTER — TRANSCRIBE ORDERS (OUTPATIENT)
Dept: ADMINISTRATIVE | Age: 85
End: 2020-05-05

## 2020-05-05 ENCOUNTER — APPOINTMENT (OUTPATIENT)
Dept: LAB | Age: 85
End: 2020-05-05
Payer: MEDICARE

## 2020-05-05 DIAGNOSIS — N18.30 CKD (CHRONIC KIDNEY DISEASE), STAGE III (HCC): ICD-10-CM

## 2020-05-05 DIAGNOSIS — E11.29 DIABETES MELLITUS WITH MICROALBUMINURIA (HCC): ICD-10-CM

## 2020-05-05 DIAGNOSIS — I10 ESSENTIAL HYPERTENSION: Chronic | ICD-10-CM

## 2020-05-05 DIAGNOSIS — R80.9 DIABETES MELLITUS WITH MICROALBUMINURIA (HCC): ICD-10-CM

## 2020-05-05 LAB
ALBUMIN SERPL BCP-MCNC: 3.7 G/DL (ref 3.5–5)
ALP SERPL-CCNC: 61 U/L (ref 46–116)
ALT SERPL W P-5'-P-CCNC: 21 U/L (ref 12–78)
ANION GAP SERPL CALCULATED.3IONS-SCNC: 6 MMOL/L (ref 4–13)
AST SERPL W P-5'-P-CCNC: 17 U/L (ref 5–45)
BILIRUB SERPL-MCNC: 0.26 MG/DL (ref 0.2–1)
BUN SERPL-MCNC: 47 MG/DL (ref 5–25)
CALCIUM SERPL-MCNC: 9.6 MG/DL (ref 8.3–10.1)
CHLORIDE SERPL-SCNC: 109 MMOL/L (ref 100–108)
CO2 SERPL-SCNC: 25 MMOL/L (ref 21–32)
CREAT SERPL-MCNC: 1.57 MG/DL (ref 0.6–1.3)
CREAT UR-MCNC: 90.9 MG/DL
ERYTHROCYTE [DISTWIDTH] IN BLOOD BY AUTOMATED COUNT: 14.3 % (ref 11.6–15.1)
EST. AVERAGE GLUCOSE BLD GHB EST-MCNC: 148 MG/DL
GFR SERPL CREATININE-BSD FRML MDRD: 29 ML/MIN/1.73SQ M
GLUCOSE P FAST SERPL-MCNC: 77 MG/DL (ref 65–99)
HBA1C MFR BLD: 6.8 %
HCT VFR BLD AUTO: 34.9 % (ref 34.8–46.1)
HGB BLD-MCNC: 11.1 G/DL (ref 11.5–15.4)
MCH RBC QN AUTO: 29.2 PG (ref 26.8–34.3)
MCHC RBC AUTO-ENTMCNC: 31.8 G/DL (ref 31.4–37.4)
MCV RBC AUTO: 92 FL (ref 82–98)
MICROALBUMIN UR-MCNC: 11.6 MG/L (ref 0–20)
MICROALBUMIN/CREAT 24H UR: 13 MG/G CREATININE (ref 0–30)
PLATELET # BLD AUTO: 248 THOUSANDS/UL (ref 149–390)
PMV BLD AUTO: 11.3 FL (ref 8.9–12.7)
POTASSIUM SERPL-SCNC: 4.6 MMOL/L (ref 3.5–5.3)
PROT SERPL-MCNC: 7.6 G/DL (ref 6.4–8.2)
RBC # BLD AUTO: 3.8 MILLION/UL (ref 3.81–5.12)
SODIUM SERPL-SCNC: 140 MMOL/L (ref 136–145)
WBC # BLD AUTO: 8.57 THOUSAND/UL (ref 4.31–10.16)

## 2020-05-05 PROCEDURE — 85027 COMPLETE CBC AUTOMATED: CPT

## 2020-05-05 PROCEDURE — 36415 COLL VENOUS BLD VENIPUNCTURE: CPT

## 2020-05-05 PROCEDURE — 82570 ASSAY OF URINE CREATININE: CPT

## 2020-05-05 PROCEDURE — 82043 UR ALBUMIN QUANTITATIVE: CPT

## 2020-05-05 PROCEDURE — 83036 HEMOGLOBIN GLYCOSYLATED A1C: CPT

## 2020-05-05 PROCEDURE — 80053 COMPREHEN METABOLIC PANEL: CPT

## 2020-05-08 ENCOUNTER — TELEMEDICINE (OUTPATIENT)
Dept: INTERNAL MEDICINE CLINIC | Facility: CLINIC | Age: 85
End: 2020-05-08
Payer: MEDICARE

## 2020-05-08 VITALS — TEMPERATURE: 97.7 F

## 2020-05-08 DIAGNOSIS — R80.9 DIABETES MELLITUS WITH MICROALBUMINURIA (HCC): ICD-10-CM

## 2020-05-08 DIAGNOSIS — K21.9 CHRONIC GERD: ICD-10-CM

## 2020-05-08 DIAGNOSIS — I10 ESSENTIAL HYPERTENSION: Chronic | ICD-10-CM

## 2020-05-08 DIAGNOSIS — N18.30 CKD (CHRONIC KIDNEY DISEASE), STAGE III (HCC): Primary | ICD-10-CM

## 2020-05-08 DIAGNOSIS — E11.29 DIABETES MELLITUS WITH MICROALBUMINURIA (HCC): ICD-10-CM

## 2020-05-08 PROCEDURE — 99214 OFFICE O/P EST MOD 30 MIN: CPT | Performed by: INTERNAL MEDICINE

## 2020-05-08 RX ORDER — IBUPROFEN 200 MG
1 CAPSULE ORAL DAILY
COMMUNITY
End: 2021-01-01

## 2020-05-27 ENCOUNTER — HOSPITAL ENCOUNTER (OUTPATIENT)
Dept: RADIOLOGY | Age: 85
Discharge: HOME | End: 2020-05-27
Payer: MEDICARE

## 2020-05-27 DIAGNOSIS — C34.91 SQUAMOUS CELL LUNG CANCER, RIGHT (HCC): ICD-10-CM

## 2020-05-27 PROCEDURE — 71250 CT THORAX DX C-: CPT

## 2020-05-29 ENCOUNTER — TELEMEDICINE (OUTPATIENT)
Dept: CARDIAC SURGERY | Facility: CLINIC | Age: 85
End: 2020-05-29
Payer: MEDICARE

## 2020-05-29 DIAGNOSIS — Z85.118 HISTORY OF LUNG CANCER: Primary | ICD-10-CM

## 2020-05-29 PROCEDURE — 99213 OFFICE O/P EST LOW 20 MIN: CPT | Performed by: PHYSICIAN ASSISTANT

## 2020-07-10 DIAGNOSIS — E78.2 MIXED HYPERLIPIDEMIA: ICD-10-CM

## 2020-07-10 RX ORDER — PRAVASTATIN SODIUM 20 MG
20 TABLET ORAL DAILY
Qty: 90 TABLET | Refills: 1 | Status: SHIPPED | OUTPATIENT
Start: 2020-07-10 | End: 2020-12-17

## 2020-07-10 NOTE — TELEPHONE ENCOUNTER
Patient called, she said the mail order states that on their half it says medication was discontinued

## 2020-07-16 DIAGNOSIS — I10 ESSENTIAL HYPERTENSION: Chronic | ICD-10-CM

## 2020-07-16 RX ORDER — LISINOPRIL 10 MG/1
10 TABLET ORAL DAILY
Qty: 90 TABLET | Refills: 1 | Status: SHIPPED | OUTPATIENT
Start: 2020-07-16 | End: 2020-12-17

## 2020-07-21 DIAGNOSIS — I10 ESSENTIAL HYPERTENSION: Chronic | ICD-10-CM

## 2020-07-21 RX ORDER — LISINOPRIL 10 MG/1
10 TABLET ORAL DAILY
Qty: 90 TABLET | Refills: 1 | OUTPATIENT
Start: 2020-07-21

## 2020-07-21 NOTE — TELEPHONE ENCOUNTER
The previous refill approved on 7/16 was set to print and not sent to [facility]  Can you send it again  Thank you

## 2020-09-08 ENCOUNTER — APPOINTMENT (OUTPATIENT)
Dept: LAB | Facility: CLINIC | Age: 85
End: 2020-09-08
Payer: MEDICARE

## 2020-09-08 DIAGNOSIS — I10 ESSENTIAL HYPERTENSION: Chronic | ICD-10-CM

## 2020-09-08 DIAGNOSIS — R80.9 DIABETES MELLITUS WITH MICROALBUMINURIA (HCC): ICD-10-CM

## 2020-09-08 DIAGNOSIS — E11.29 DIABETES MELLITUS WITH MICROALBUMINURIA (HCC): ICD-10-CM

## 2020-09-08 DIAGNOSIS — N18.30 CKD (CHRONIC KIDNEY DISEASE), STAGE III (HCC): ICD-10-CM

## 2020-09-08 LAB
ALBUMIN SERPL BCP-MCNC: 3.5 G/DL (ref 3.5–5)
ALP SERPL-CCNC: 62 U/L (ref 46–116)
ALT SERPL W P-5'-P-CCNC: 19 U/L (ref 12–78)
ANION GAP SERPL CALCULATED.3IONS-SCNC: 4 MMOL/L (ref 4–13)
AST SERPL W P-5'-P-CCNC: 21 U/L (ref 5–45)
BILIRUB SERPL-MCNC: 0.24 MG/DL (ref 0.2–1)
BUN SERPL-MCNC: 43 MG/DL (ref 5–25)
CALCIUM SERPL-MCNC: 9.7 MG/DL (ref 8.3–10.1)
CHLORIDE SERPL-SCNC: 110 MMOL/L (ref 100–108)
CHOLEST SERPL-MCNC: 151 MG/DL (ref 50–200)
CO2 SERPL-SCNC: 27 MMOL/L (ref 21–32)
CREAT SERPL-MCNC: 1.45 MG/DL (ref 0.6–1.3)
ERYTHROCYTE [DISTWIDTH] IN BLOOD BY AUTOMATED COUNT: 14.1 % (ref 11.6–15.1)
EST. AVERAGE GLUCOSE BLD GHB EST-MCNC: 157 MG/DL
GFR SERPL CREATININE-BSD FRML MDRD: 32 ML/MIN/1.73SQ M
GLUCOSE P FAST SERPL-MCNC: 110 MG/DL (ref 65–99)
HBA1C MFR BLD: 7.1 %
HCT VFR BLD AUTO: 35.5 % (ref 34.8–46.1)
HDLC SERPL-MCNC: 45 MG/DL
HGB BLD-MCNC: 11 G/DL (ref 11.5–15.4)
LDLC SERPL CALC-MCNC: 69 MG/DL (ref 0–100)
MCH RBC QN AUTO: 28.8 PG (ref 26.8–34.3)
MCHC RBC AUTO-ENTMCNC: 31 G/DL (ref 31.4–37.4)
MCV RBC AUTO: 93 FL (ref 82–98)
NONHDLC SERPL-MCNC: 106 MG/DL
PLATELET # BLD AUTO: 238 THOUSANDS/UL (ref 149–390)
PMV BLD AUTO: 11.5 FL (ref 8.9–12.7)
POTASSIUM SERPL-SCNC: 5 MMOL/L (ref 3.5–5.3)
PROT SERPL-MCNC: 7.7 G/DL (ref 6.4–8.2)
RBC # BLD AUTO: 3.82 MILLION/UL (ref 3.81–5.12)
SODIUM SERPL-SCNC: 141 MMOL/L (ref 136–145)
TRIGL SERPL-MCNC: 184 MG/DL
WBC # BLD AUTO: 8.4 THOUSAND/UL (ref 4.31–10.16)

## 2020-09-08 PROCEDURE — 83036 HEMOGLOBIN GLYCOSYLATED A1C: CPT

## 2020-09-08 PROCEDURE — 80061 LIPID PANEL: CPT

## 2020-09-08 PROCEDURE — 80053 COMPREHEN METABOLIC PANEL: CPT

## 2020-09-08 PROCEDURE — 85027 COMPLETE CBC AUTOMATED: CPT

## 2020-09-08 PROCEDURE — 36415 COLL VENOUS BLD VENIPUNCTURE: CPT

## 2020-09-11 ENCOUNTER — OFFICE VISIT (OUTPATIENT)
Dept: INTERNAL MEDICINE CLINIC | Facility: CLINIC | Age: 85
End: 2020-09-11
Payer: MEDICARE

## 2020-09-11 VITALS
SYSTOLIC BLOOD PRESSURE: 128 MMHG | BODY MASS INDEX: 31.53 KG/M2 | OXYGEN SATURATION: 97 % | DIASTOLIC BLOOD PRESSURE: 78 MMHG | TEMPERATURE: 97.8 F | WEIGHT: 167 LBS | HEIGHT: 61 IN | HEART RATE: 81 BPM | RESPIRATION RATE: 16 BRPM

## 2020-09-11 DIAGNOSIS — E78.2 MIXED HYPERLIPIDEMIA: ICD-10-CM

## 2020-09-11 DIAGNOSIS — S81.812A SKIN TEAR OF LEFT LOWER LEG WITHOUT COMPLICATION, INITIAL ENCOUNTER: Primary | ICD-10-CM

## 2020-09-11 DIAGNOSIS — Z23 ENCOUNTER FOR IMMUNIZATION: ICD-10-CM

## 2020-09-11 DIAGNOSIS — R80.9 DIABETES MELLITUS WITH MICROALBUMINURIA (HCC): ICD-10-CM

## 2020-09-11 DIAGNOSIS — D64.9 ANEMIA, UNSPECIFIED TYPE: ICD-10-CM

## 2020-09-11 DIAGNOSIS — N18.30 CKD (CHRONIC KIDNEY DISEASE), STAGE III (HCC): ICD-10-CM

## 2020-09-11 DIAGNOSIS — E11.29 DIABETES MELLITUS WITH MICROALBUMINURIA (HCC): ICD-10-CM

## 2020-09-11 PROCEDURE — 90662 IIV NO PRSV INCREASED AG IM: CPT

## 2020-09-11 PROCEDURE — G0008 ADMIN INFLUENZA VIRUS VAC: HCPCS

## 2020-09-11 PROCEDURE — 99214 OFFICE O/P EST MOD 30 MIN: CPT | Performed by: INTERNAL MEDICINE

## 2020-09-11 RX ORDER — BRIMONIDINE TARTRATE 0.1 %
1 DROPS OPHTHALMIC (EYE) EVERY 8 HOURS SCHEDULED
COMMUNITY
Start: 2020-09-02 | End: 2021-01-01

## 2020-09-11 NOTE — ASSESSMENT & PLAN NOTE
-DM2 with microalbuminuria and CKD  A1c gerber to 7 1 but is acceptable      Lab Results   Component Value Date    HGBA1C 7 1 (H) 09/08/2020   -tolerating current regimen of Basaglar 34 units daily, monitor s/s hypoglycemia  -on ACEI  -foot exam performed

## 2020-09-11 NOTE — PROGRESS NOTES
Assessment/Plan:    Problem List Items Addressed This Visit        Endocrine    Diabetes mellitus with microalbuminuria ([facility] [address] )     -DM2 with microalbuminuria and CKD  A1c gerber to 7 1 but is acceptable  Lab Results   Component Value Date    HGBA1C 7 1 (H) 09/08/2020   -tolerating current regimen of Basaglar 34 units daily, monitor s/s hypoglycemia  -on ACEI  -foot exam performed         Relevant Orders    Comprehensive metabolic panel    Hemoglobin A1C    Microalbumin / creatinine urine ratio       Musculoskeletal and Integument    Skin tear of left lower leg without complication - Primary     -can d/c neosporin  -keep area clean and dry            Genitourinary    CKD (chronic kidney disease), stage III (HCC)    Relevant Orders    Comprehensive metabolic panel       Other    Anemia     -mild  -has underlying CKD  -check iron and ferritin and repeat CBC  -asymptomatic         Relevant Orders    CBC and differential    Ferritin    Iron    Comprehensive metabolic panel    Hyperlipidemia     -overall stable  -continue pravastatin 20mg daily         Relevant Orders    Comprehensive metabolic panel      Other Visit Diagnoses     Encounter for immunization        Relevant Orders    influenza vaccine, high-dose, PF 0 7 mL (FLUZONE HIGH-DOSE) (Completed)          Subjective:      Patient ID: Vera Jorge is a 80 y o  female  79yo female with DM2 with CKD and microalbuminuria, chronic GERD, HTN, HLD, bladder CA, RLL lung CA s/p lobectomy and h/o lymphoma here for follow up care  She got up out of a chair once week ago and her L lower leg hit the side of the chair and had a skin tear that she has been applying neosporin on it  No drainage, fever  Diabetes   She presents for her follow-up diabetic visit  She has type 2 diabetes mellitus  Her disease course has been stable  Pertinent negatives for hypoglycemia include no dizziness or headaches   Pertinent negatives for diabetes include no chest pain, no fatigue and no weakness  There are no hypoglycemic complications  (Microalbuminuria, CKD) Current diabetic treatment includes insulin injections  Her breakfast blood glucose range is generally 110-130 mg/dl  An ACE inhibitor/angiotensin II receptor blocker is being taken  Hyperlipidemia   This is a chronic problem  The current episode started more than 1 year ago  The problem is controlled  Exacerbating diseases include diabetes and obesity  Pertinent negatives include no chest pain or shortness of breath  Current antihyperlipidemic treatment includes statins  Risk factors for coronary artery disease include diabetes mellitus, hypertension, obesity, dyslipidemia, post-menopausal and a sedentary lifestyle  She feels she is getting slower, goes shopping once weekly but mainly is in her home  She listens to audiobooks outdoors  Had surveillance CT chest for lung cancer of RLL  Denies SOB, cough, CP  The following portions of the patient's history were reviewed and updated as appropriate: allergies, current medications, past family history, past medical history, past social history, past surgical history and problem list       Current Outpatient Medications:     acetaminophen (TYLENOL) 325 mg tablet, Take 2 tablets (650 mg total) by mouth every 6 (six) hours as needed for mild pain, headaches or fever  , Disp: 30 tablet, Rfl: 0    Alphagan P 0 1 %, , Disp: , Rfl:     amLODIPine (NORVASC) 5 mg tablet, Take 1 tablet (5 mg total) by mouth daily, Disp: 90 tablet, Rfl: 1    calcium carbonate (OS-DEMETRIA) 1250 (500 Ca) MG tablet, Take 1 tablet by mouth daily, Disp: , Rfl:     calcium citrate-vitamin D (CITRACAL+D) 315-200 MG-UNIT per tablet, Take 1 tablet by mouth daily  , Disp: , Rfl:     Cyanocobalamin (VITAMIN B-12 PO), Take 1 tablet by mouth , Disp: , Rfl:     hydrochlorothiazide (HYDRODIURIL) 12 5 mg tablet, Take 1 tablet (12 5 mg total) by mouth daily, Disp: 90 tablet, Rfl: 1    insulin glargine (BASAGLAR KWIKPEN) 100 units/mL injection pen, Inject 34 Units under the skin daily, Disp: 135 mL, Rfl: 1    Insulin Pen Needle (BD ULTRA-FINE PEN NEEDLES) 29G X 12 7MM MISC, by Does not apply route 2 (two) times a day, Disp: 180 each, Rfl: 1    lisinopril (ZESTRIL) 10 mg tablet, Take 1 tablet (10 mg total) by mouth daily, Disp: 90 tablet, Rfl: 1    Multiple Vitamin (MULTIVITAMIN) tablet, Take 1 tablet by mouth daily  , Disp: , Rfl:     omeprazole (PriLOSEC) 20 mg delayed release capsule, Take 20 mg by mouth daily  , Disp: , Rfl:     pravastatin (PRAVACHOL) 20 mg tablet, Take 1 tablet (20 mg total) by mouth daily, Disp: 90 tablet, Rfl: 1    Travoprost (TRAVATAN OP), Apply to eye, Disp: , Rfl:     Review of Systems   Constitutional: Positive for activity change and unexpected weight change (weight gain)  Negative for appetite change and fatigue  Eyes: Positive for discharge and visual disturbance  Respiratory: Negative for cough, shortness of breath, wheezing and stridor  Cardiovascular: Negative for chest pain, palpitations and leg swelling  Gastrointestinal: Negative for abdominal pain, blood in stool, constipation, diarrhea, nausea and vomiting  Genitourinary:        Nocturia x1   Musculoskeletal: Positive for gait problem  Neurological: Negative for dizziness, weakness, numbness and headaches  Psychiatric/Behavioral: Negative for sleep disturbance  Objective:    /78 (BP Location: Left arm, Patient Position: Sitting)   Pulse 81   Temp 97 8 °F (36 6 °C)   Resp 16   Ht 5' 1" (1 549 m)   Wt 75 8 kg (167 lb)   SpO2 97%   BMI 31 55 kg/m²      Physical Exam  Vitals signs reviewed  Constitutional:       General: She is not in acute distress  Appearance: She is obese  She is not diaphoretic  Eyes:      General:         Right eye: Discharge (watery) present  Left eye: Discharge (watery) present  Cardiovascular:      Rate and Rhythm: Normal rate and regular rhythm        Pulses: Normal pulses  no weak pulses          Dorsalis pedis pulses are 2+ on the right side and 2+ on the left side  Posterior tibial pulses are 2+ on the right side and 2+ on the left side  Heart sounds: Normal heart sounds  Pulmonary:      Effort: Pulmonary effort is normal  No respiratory distress  Breath sounds: Normal breath sounds  No wheezing  Abdominal:      General: Bowel sounds are normal  There is no distension  Palpations: Abdomen is soft  Tenderness: There is no abdominal tenderness  Musculoskeletal:      Right lower leg: No edema  Left lower leg: No edema  Feet:      Right foot:      Skin integrity: Dry skin present  Left foot:      Skin integrity: Dry skin present  Skin:     Comments: L mid tibia 2 5cm skin tear, mostly approximated, no drainage or erythema noted   Neurological:      General: No focal deficit present  Mental Status: She is alert and oriented to person, place, and time  Psychiatric:         Attention and Perception: Attention normal          Mood and Affect: Mood normal          Speech: Speech normal          Behavior: Behavior is cooperative  Patient's shoes and socks removed  Right Foot/Ankle   Right Foot Inspection  Skin Exam: dry skin                          Toe Exam: swelling  Sensory       Monofilament testing: intact  Vascular    The right DP pulse is 2+  The right PT pulse is 2+  Left Foot/Ankle  Left Foot Inspection  Skin Exam: dry skin                         Toe Exam: swelling                   Sensory       Monofilament: intact  Vascular    The left DP pulse is 2+  The left PT pulse is 2+  Assign Risk Category:  Deformity present; No loss of protective sensation;  No weak pulses       Risk: 1     Recent Results (from the past 504 hour(s))   Lipid panel    Collection Time: 09/08/20  8:29 AM   Result Value Ref Range    Cholesterol 151 50 - 200 mg/dL    Triglycerides 184 (H) <=150 mg/dL    HDL, Direct 45 >=40 mg/dL LDL Calculated 69 0 - 100 mg/dL    Non-HDL-Chol (CHOL-HDL) 106 mg/dl   Comprehensive metabolic panel    Collection Time: 09/08/20  8:29 AM   Result Value Ref Range    Sodium 141 136 - 145 mmol/L    Potassium 5 0 3 5 - 5 3 mmol/L    Chloride 110 (H) 100 - 108 mmol/L    CO2 27 21 - 32 mmol/L    ANION GAP 4 4 - 13 mmol/L    BUN 43 (H) 5 - 25 mg/dL    Creatinine 1 45 (H) 0 60 - 1 30 mg/dL    Glucose, Fasting 110 (H) 65 - 99 mg/dL    Calcium 9 7 8 3 - 10 1 mg/dL    AST 21 5 - 45 U/L    ALT 19 12 - 78 U/L    Alkaline Phosphatase 62 46 - 116 U/L    Total Protein 7 7 6 4 - 8 2 g/dL    Albumin 3 5 3 5 - 5 0 g/dL    Total Bilirubin 0 24 0 20 - 1 00 mg/dL    eGFR 32 ml/min/1 73sq m   CBC    Collection Time: 09/08/20  8:29 AM   Result Value Ref Range    WBC 8 40 4 31 - 10 16 Thousand/uL    RBC 3 82 3 81 - 5 12 Million/uL    Hemoglobin 11 0 (L) 11 5 - 15 4 g/dL    Hematocrit 35 5 34 8 - 46 1 %    MCV 93 82 - 98 fL    MCH 28 8 26 8 - 34 3 pg    MCHC 31 0 (L) 31 4 - 37 4 g/dL    RDW 14 1 11 6 - 15 1 %    Platelets 408 870 - 283 Thousands/uL    MPV 11 5 8 9 - 12 7 fL   Hemoglobin A1C    Collection Time: 09/08/20  8:29 AM   Result Value Ref Range    Hemoglobin A1C 7 1 (H) Normal 3 8-5 6%; PreDiabetic 5 7-6 4%; Diabetic >=6 5%; Glycemic control for adults with diabetes <7 0% %     mg/dl     BMI Counseling: Body mass index is 31 55 kg/m²  The BMI is above normal  Nutrition recommendations include decreasing portion sizes, consuming healthier snacks, limiting drinks that contain sugar, moderation in carbohydrate intake, reducing intake of saturated and trans fat and reducing intake of cholesterol  Exercise recommendations include exercising 3-5 times per week and strength training exercises  No pharmacotherapy was ordered

## 2020-09-25 DIAGNOSIS — I10 BENIGN ESSENTIAL HTN: ICD-10-CM

## 2020-09-25 RX ORDER — AMLODIPINE BESYLATE 5 MG/1
TABLET ORAL
Qty: 90 TABLET | Refills: 1 | Status: SHIPPED | OUTPATIENT
Start: 2020-09-25 | End: 2021-01-15 | Stop reason: SDUPTHER

## 2020-10-30 ENCOUNTER — TELEPHONE (OUTPATIENT)
Dept: CARDIAC SURGERY | Facility: CLINIC | Age: 85
End: 2020-10-30

## 2020-10-30 DIAGNOSIS — I10 ESSENTIAL HYPERTENSION: Chronic | ICD-10-CM

## 2020-10-30 RX ORDER — HYDROCHLOROTHIAZIDE 12.5 MG/1
TABLET ORAL
Qty: 90 TABLET | Refills: 1 | Status: SHIPPED | OUTPATIENT
Start: 2020-10-30 | End: 2021-01-15 | Stop reason: SDUPTHER

## 2020-12-17 DIAGNOSIS — E78.2 MIXED HYPERLIPIDEMIA: ICD-10-CM

## 2020-12-17 DIAGNOSIS — I10 ESSENTIAL HYPERTENSION: Chronic | ICD-10-CM

## 2020-12-17 RX ORDER — LISINOPRIL 10 MG/1
TABLET ORAL
Qty: 90 TABLET | Refills: 1 | Status: SHIPPED | OUTPATIENT
Start: 2020-12-17 | End: 2021-01-15 | Stop reason: SDUPTHER

## 2020-12-17 RX ORDER — PRAVASTATIN SODIUM 20 MG
TABLET ORAL
Qty: 90 TABLET | Refills: 1 | Status: SHIPPED | OUTPATIENT
Start: 2020-12-17 | End: 2021-01-15 | Stop reason: SDUPTHER

## 2021-01-01 ENCOUNTER — OFFICE VISIT (OUTPATIENT)
Dept: CARDIAC SURGERY | Facility: CLINIC | Age: 86
End: 2021-01-01
Payer: MEDICARE

## 2021-01-01 ENCOUNTER — OFFICE VISIT (OUTPATIENT)
Dept: INTERNAL MEDICINE CLINIC | Facility: CLINIC | Age: 86
End: 2021-01-01
Payer: MEDICARE

## 2021-01-01 ENCOUNTER — APPOINTMENT (OUTPATIENT)
Dept: LAB | Facility: CLINIC | Age: 86
End: 2021-01-01
Payer: MEDICARE

## 2021-01-01 ENCOUNTER — IMMUNIZATIONS (OUTPATIENT)
Dept: FAMILY MEDICINE CLINIC | Facility: HOSPITAL | Age: 86
End: 2021-01-01

## 2021-01-01 ENCOUNTER — TELEPHONE (OUTPATIENT)
Dept: INTERNAL MEDICINE CLINIC | Facility: CLINIC | Age: 86
End: 2021-01-01

## 2021-01-01 ENCOUNTER — HOSPITAL ENCOUNTER (OUTPATIENT)
Dept: RADIOLOGY | Age: 86
Discharge: HOME | End: 2021-05-24
Payer: MEDICARE

## 2021-01-01 ENCOUNTER — CONSULT (OUTPATIENT)
Dept: CARDIOLOGY CLINIC | Facility: CLINIC | Age: 86
End: 2021-01-01
Payer: MEDICARE

## 2021-01-01 VITALS
TEMPERATURE: 97 F | DIASTOLIC BLOOD PRESSURE: 62 MMHG | SYSTOLIC BLOOD PRESSURE: 150 MMHG | HEIGHT: 61 IN | BODY MASS INDEX: 29.07 KG/M2 | OXYGEN SATURATION: 96 % | HEART RATE: 115 BPM | WEIGHT: 154 LBS

## 2021-01-01 VITALS
OXYGEN SATURATION: 98 % | SYSTOLIC BLOOD PRESSURE: 150 MMHG | WEIGHT: 164.24 LBS | HEART RATE: 90 BPM | TEMPERATURE: 98.4 F | DIASTOLIC BLOOD PRESSURE: 78 MMHG | HEIGHT: 61 IN | RESPIRATION RATE: 16 BRPM | BODY MASS INDEX: 31.01 KG/M2

## 2021-01-01 VITALS
DIASTOLIC BLOOD PRESSURE: 74 MMHG | SYSTOLIC BLOOD PRESSURE: 158 MMHG | WEIGHT: 155.2 LBS | BODY MASS INDEX: 29.3 KG/M2 | HEIGHT: 61 IN | HEART RATE: 94 BPM

## 2021-01-01 VITALS
BODY MASS INDEX: 30.4 KG/M2 | RESPIRATION RATE: 16 BRPM | HEART RATE: 90 BPM | WEIGHT: 161 LBS | HEIGHT: 61 IN | DIASTOLIC BLOOD PRESSURE: 70 MMHG | OXYGEN SATURATION: 96 % | SYSTOLIC BLOOD PRESSURE: 144 MMHG | TEMPERATURE: 98.5 F

## 2021-01-01 VITALS
HEIGHT: 61 IN | BODY MASS INDEX: 31.15 KG/M2 | OXYGEN SATURATION: 98 % | HEART RATE: 94 BPM | WEIGHT: 165 LBS | RESPIRATION RATE: 16 BRPM | TEMPERATURE: 98.9 F | SYSTOLIC BLOOD PRESSURE: 128 MMHG | DIASTOLIC BLOOD PRESSURE: 70 MMHG

## 2021-01-01 DIAGNOSIS — S86.912A MUSCLE STRAIN OF LEFT LOWER LEG, INITIAL ENCOUNTER: Primary | ICD-10-CM

## 2021-01-01 DIAGNOSIS — E11.51 PERIPHERAL CIRCULATORY DISORDER DUE TO TYPE 2 DIABETES MELLITUS (HCC): ICD-10-CM

## 2021-01-01 DIAGNOSIS — R80.9 DIABETES MELLITUS WITH MICROALBUMINURIA (HCC): Primary | ICD-10-CM

## 2021-01-01 DIAGNOSIS — I10 BENIGN ESSENTIAL HTN: ICD-10-CM

## 2021-01-01 DIAGNOSIS — N18.32 STAGE 3B CHRONIC KIDNEY DISEASE (HCC): ICD-10-CM

## 2021-01-01 DIAGNOSIS — E83.52 HYPERCALCEMIA: ICD-10-CM

## 2021-01-01 DIAGNOSIS — R80.9 DIABETES MELLITUS WITH MICROALBUMINURIA (HCC): ICD-10-CM

## 2021-01-01 DIAGNOSIS — R29.898 WEAKNESS OF BOTH LOWER EXTREMITIES: ICD-10-CM

## 2021-01-01 DIAGNOSIS — I10 ESSENTIAL HYPERTENSION: Chronic | ICD-10-CM

## 2021-01-01 DIAGNOSIS — D63.1 ANEMIA DUE TO STAGE 3B CHRONIC KIDNEY DISEASE (HCC): ICD-10-CM

## 2021-01-01 DIAGNOSIS — Z85.118 HISTORY OF ADENOCARCINOMA OF LUNG: ICD-10-CM

## 2021-01-01 DIAGNOSIS — D63.1 ANEMIA DUE TO CHRONIC KIDNEY DISEASE, UNSPECIFIED CKD STAGE: Primary | ICD-10-CM

## 2021-01-01 DIAGNOSIS — E11.29 DIABETES MELLITUS WITH MICROALBUMINURIA (HCC): ICD-10-CM

## 2021-01-01 DIAGNOSIS — R79.89 DECREASED THYROID STIMULATING HORMONE (TSH) LEVEL: ICD-10-CM

## 2021-01-01 DIAGNOSIS — E78.2 MIXED HYPERLIPIDEMIA: ICD-10-CM

## 2021-01-01 DIAGNOSIS — N18.4 CKD (CHRONIC KIDNEY DISEASE), STAGE IV (HCC): ICD-10-CM

## 2021-01-01 DIAGNOSIS — Z23 ENCOUNTER FOR IMMUNIZATION: Primary | ICD-10-CM

## 2021-01-01 DIAGNOSIS — N18.32 ANEMIA DUE TO STAGE 3B CHRONIC KIDNEY DISEASE (HCC): ICD-10-CM

## 2021-01-01 DIAGNOSIS — Z85.118 HISTORY OF LUNG CANCER: ICD-10-CM

## 2021-01-01 DIAGNOSIS — C34.91 ADENOCARCINOMA OF LUNG, RIGHT (HCC): Primary | ICD-10-CM

## 2021-01-01 DIAGNOSIS — N18.4 CKD (CHRONIC KIDNEY DISEASE), STAGE IV (HCC): Primary | ICD-10-CM

## 2021-01-01 DIAGNOSIS — N18.9 ANEMIA DUE TO CHRONIC KIDNEY DISEASE, UNSPECIFIED CKD STAGE: Primary | ICD-10-CM

## 2021-01-01 DIAGNOSIS — R94.31 ABNORMAL EKG: Primary | ICD-10-CM

## 2021-01-01 DIAGNOSIS — E21.3 HYPERPARATHYROIDISM (HCC): ICD-10-CM

## 2021-01-01 DIAGNOSIS — E11.29 DIABETES MELLITUS WITH MICROALBUMINURIA (HCC): Primary | ICD-10-CM

## 2021-01-01 LAB
ALBUMIN SERPL BCP-MCNC: 3.2 G/DL (ref 3.5–5)
ALBUMIN SERPL BCP-MCNC: 3.4 G/DL (ref 3.5–5)
ALP SERPL-CCNC: 49 U/L (ref 46–116)
ALP SERPL-CCNC: 64 U/L (ref 46–116)
ALT SERPL W P-5'-P-CCNC: 14 U/L (ref 12–78)
ALT SERPL W P-5'-P-CCNC: 21 U/L (ref 12–78)
ANION GAP SERPL CALCULATED.3IONS-SCNC: 5 MMOL/L (ref 4–13)
ANION GAP SERPL CALCULATED.3IONS-SCNC: 6 MMOL/L (ref 4–13)
ANION GAP SERPL CALCULATED.3IONS-SCNC: 7 MMOL/L (ref 4–13)
ANION GAP SERPL CALCULATED.3IONS-SCNC: 8 MMOL/L (ref 4–13)
AST SERPL W P-5'-P-CCNC: 11 U/L (ref 5–45)
AST SERPL W P-5'-P-CCNC: 20 U/L (ref 5–45)
BACTERIA UR QL AUTO: ABNORMAL /HPF
BASOPHILS # BLD AUTO: 0.07 THOUSANDS/ΜL (ref 0–0.1)
BASOPHILS NFR BLD AUTO: 1 % (ref 0–1)
BILIRUB SERPL-MCNC: 0.26 MG/DL (ref 0.2–1)
BILIRUB SERPL-MCNC: 0.28 MG/DL (ref 0.2–1)
BILIRUB UR QL STRIP: NEGATIVE
BUN SERPL-MCNC: 40 MG/DL (ref 5–25)
BUN SERPL-MCNC: 47 MG/DL (ref 5–25)
BUN SERPL-MCNC: 51 MG/DL (ref 5–25)
BUN SERPL-MCNC: 56 MG/DL (ref 5–25)
CALCIUM ALBUM COR SERPL-MCNC: 10.4 MG/DL (ref 8.3–10.1)
CALCIUM ALBUM COR SERPL-MCNC: 11.2 MG/DL (ref 8.3–10.1)
CALCIUM SERPL-MCNC: 10.7 MG/DL (ref 8.3–10.1)
CALCIUM SERPL-MCNC: 11.9 MG/DL (ref 8.3–10.1)
CALCIUM SERPL-MCNC: 8.9 MG/DL (ref 8.3–10.1)
CALCIUM SERPL-MCNC: 9.8 MG/DL (ref 8.3–10.1)
CHLORIDE SERPL-SCNC: 105 MMOL/L (ref 100–108)
CHLORIDE SERPL-SCNC: 108 MMOL/L (ref 100–108)
CHLORIDE SERPL-SCNC: 110 MMOL/L (ref 100–108)
CHLORIDE SERPL-SCNC: 111 MMOL/L (ref 100–108)
CHOLEST SERPL-MCNC: 132 MG/DL (ref 50–200)
CLARITY UR: ABNORMAL
CO2 SERPL-SCNC: 20 MMOL/L (ref 21–32)
CO2 SERPL-SCNC: 23 MMOL/L (ref 21–32)
CO2 SERPL-SCNC: 26 MMOL/L (ref 21–32)
CO2 SERPL-SCNC: 26 MMOL/L (ref 21–32)
COLOR UR: YELLOW
CREAT SERPL-MCNC: 1.77 MG/DL (ref 0.6–1.3)
CREAT SERPL-MCNC: 1.89 MG/DL (ref 0.6–1.3)
CREAT SERPL-MCNC: 2.14 MG/DL (ref 0.6–1.3)
CREAT SERPL-MCNC: 2.26 MG/DL (ref 0.6–1.3)
CREAT UR-MCNC: 47.7 MG/DL
EOSINOPHIL # BLD AUTO: 0.15 THOUSAND/ΜL (ref 0–0.61)
EOSINOPHIL NFR BLD AUTO: 2 % (ref 0–6)
ERYTHROCYTE [DISTWIDTH] IN BLOOD BY AUTOMATED COUNT: 15.8 % (ref 11.6–15.1)
ERYTHROCYTE [DISTWIDTH] IN BLOOD BY AUTOMATED COUNT: 16 % (ref 11.6–15.1)
EST. AVERAGE GLUCOSE BLD GHB EST-MCNC: 140 MG/DL
FERRITIN SERPL-MCNC: 142 NG/ML (ref 8–388)
GFR SERPL CREATININE-BSD FRML MDRD: 19 ML/MIN/1.73SQ M
GFR SERPL CREATININE-BSD FRML MDRD: 20 ML/MIN/1.73SQ M
GFR SERPL CREATININE-BSD FRML MDRD: 23 ML/MIN/1.73SQ M
GFR SERPL CREATININE-BSD FRML MDRD: 25 ML/MIN/1.73SQ M
GLUCOSE P FAST SERPL-MCNC: 101 MG/DL (ref 65–99)
GLUCOSE SERPL-MCNC: 160 MG/DL (ref 65–140)
GLUCOSE SERPL-MCNC: 72 MG/DL (ref 65–140)
GLUCOSE SERPL-MCNC: 81 MG/DL (ref 65–140)
GLUCOSE UR STRIP-MCNC: NEGATIVE MG/DL
HBA1C MFR BLD: 6.5 %
HCT VFR BLD AUTO: 32.4 % (ref 34.8–46.1)
HCT VFR BLD AUTO: 32.8 % (ref 34.8–46.1)
HDLC SERPL-MCNC: 40 MG/DL
HGB BLD-MCNC: 9.7 G/DL (ref 11.5–15.4)
HGB BLD-MCNC: 9.8 G/DL (ref 11.5–15.4)
HGB UR QL STRIP.AUTO: NEGATIVE
HYALINE CASTS #/AREA URNS LPF: ABNORMAL /LPF
IMM GRANULOCYTES # BLD AUTO: 0.04 THOUSAND/UL (ref 0–0.2)
IMM GRANULOCYTES NFR BLD AUTO: 0 % (ref 0–2)
IRON SERPL-MCNC: 53 UG/DL (ref 50–170)
KETONES UR STRIP-MCNC: NEGATIVE MG/DL
LDLC SERPL CALC-MCNC: 67 MG/DL (ref 0–100)
LEUKOCYTE ESTERASE UR QL STRIP: ABNORMAL
LYMPHOCYTES # BLD AUTO: 3.23 THOUSANDS/ΜL (ref 0.6–4.47)
LYMPHOCYTES NFR BLD AUTO: 31 % (ref 14–44)
MCH RBC QN AUTO: 28.4 PG (ref 26.8–34.3)
MCH RBC QN AUTO: 28.4 PG (ref 26.8–34.3)
MCHC RBC AUTO-ENTMCNC: 29.9 G/DL (ref 31.4–37.4)
MCHC RBC AUTO-ENTMCNC: 29.9 G/DL (ref 31.4–37.4)
MCV RBC AUTO: 95 FL (ref 82–98)
MCV RBC AUTO: 95 FL (ref 82–98)
MICROALBUMIN UR-MCNC: 18.5 MG/L (ref 0–20)
MICROALBUMIN/CREAT 24H UR: 39 MG/G CREATININE (ref 0–30)
MONOCYTES # BLD AUTO: 0.78 THOUSAND/ΜL (ref 0.17–1.22)
MONOCYTES NFR BLD AUTO: 8 % (ref 4–12)
NEUTROPHILS # BLD AUTO: 6.06 THOUSANDS/ΜL (ref 1.85–7.62)
NEUTS SEG NFR BLD AUTO: 58 % (ref 43–75)
NITRITE UR QL STRIP: NEGATIVE
NON-SQ EPI CELLS URNS QL MICRO: ABNORMAL /HPF
NONHDLC SERPL-MCNC: 92 MG/DL
NRBC BLD AUTO-RTO: 0 /100 WBCS
PH UR STRIP.AUTO: 5.5 [PH]
PLATELET # BLD AUTO: 230 THOUSANDS/UL (ref 149–390)
PLATELET # BLD AUTO: 338 THOUSANDS/UL (ref 149–390)
PMV BLD AUTO: 11 FL (ref 8.9–12.7)
PMV BLD AUTO: 11.9 FL (ref 8.9–12.7)
POTASSIUM SERPL-SCNC: 4.1 MMOL/L (ref 3.5–5.3)
POTASSIUM SERPL-SCNC: 4.4 MMOL/L (ref 3.5–5.3)
POTASSIUM SERPL-SCNC: 4.8 MMOL/L (ref 3.5–5.3)
POTASSIUM SERPL-SCNC: 4.9 MMOL/L (ref 3.5–5.3)
PROT SERPL-MCNC: 7.4 G/DL (ref 6.4–8.2)
PROT SERPL-MCNC: 8 G/DL (ref 6.4–8.2)
PROT UR STRIP-MCNC: NEGATIVE MG/DL
PTH-INTACT SERPL-MCNC: 143 PG/ML (ref 18.4–80.1)
RBC # BLD AUTO: 3.41 MILLION/UL (ref 3.81–5.12)
RBC # BLD AUTO: 3.45 MILLION/UL (ref 3.81–5.12)
RBC #/AREA URNS AUTO: ABNORMAL /HPF
SODIUM SERPL-SCNC: 137 MMOL/L (ref 136–145)
SODIUM SERPL-SCNC: 138 MMOL/L (ref 136–145)
SODIUM SERPL-SCNC: 139 MMOL/L (ref 136–145)
SODIUM SERPL-SCNC: 141 MMOL/L (ref 136–145)
SP GR UR STRIP.AUTO: 1.02 (ref 1–1.03)
T4 FREE SERPL-MCNC: 1.07 NG/DL (ref 0.76–1.46)
T4 FREE SERPL-MCNC: 1.08 NG/DL (ref 0.76–1.46)
TIBC SERPL-MCNC: 302 UG/DL (ref 250–450)
TRIGL SERPL-MCNC: 124 MG/DL
TSH SERPL DL<=0.05 MIU/L-ACNC: 0.14 UIU/ML (ref 0.36–3.74)
TSH SERPL DL<=0.05 MIU/L-ACNC: 0.26 UIU/ML (ref 0.36–3.74)
TSH SERPL DL<=0.05 MIU/L-ACNC: 0.36 UIU/ML (ref 0.36–3.74)
UROBILINOGEN UR QL STRIP.AUTO: 0.2 E.U./DL
WBC # BLD AUTO: 10.33 THOUSAND/UL (ref 4.31–10.16)
WBC # BLD AUTO: 11.31 THOUSAND/UL (ref 4.31–10.16)
WBC #/AREA URNS AUTO: ABNORMAL /HPF

## 2021-01-01 PROCEDURE — 83540 ASSAY OF IRON: CPT

## 2021-01-01 PROCEDURE — 0012A SARS-COV-2 / COVID-19 MRNA VACCINE (MODERNA) 100 MCG: CPT

## 2021-01-01 PROCEDURE — 36415 COLL VENOUS BLD VENIPUNCTURE: CPT

## 2021-01-01 PROCEDURE — 80053 COMPREHEN METABOLIC PANEL: CPT

## 2021-01-01 PROCEDURE — 99214 OFFICE O/P EST MOD 30 MIN: CPT | Performed by: INTERNAL MEDICINE

## 2021-01-01 PROCEDURE — 83036 HEMOGLOBIN GLYCOSYLATED A1C: CPT

## 2021-01-01 PROCEDURE — 83970 ASSAY OF PARATHORMONE: CPT

## 2021-01-01 PROCEDURE — 99213 OFFICE O/P EST LOW 20 MIN: CPT | Performed by: PHYSICIAN ASSISTANT

## 2021-01-01 PROCEDURE — 84439 ASSAY OF FREE THYROXINE: CPT

## 2021-01-01 PROCEDURE — 99213 OFFICE O/P EST LOW 20 MIN: CPT | Performed by: INTERNAL MEDICINE

## 2021-01-01 PROCEDURE — 84443 ASSAY THYROID STIM HORMONE: CPT

## 2021-01-01 PROCEDURE — 91301 SARS-COV-2 / COVID-19 MRNA VACCINE (MODERNA) 100 MCG: CPT

## 2021-01-01 PROCEDURE — 85027 COMPLETE CBC AUTOMATED: CPT

## 2021-01-01 PROCEDURE — 80061 LIPID PANEL: CPT

## 2021-01-01 PROCEDURE — 82570 ASSAY OF URINE CREATININE: CPT

## 2021-01-01 PROCEDURE — 93000 ELECTROCARDIOGRAM COMPLETE: CPT | Performed by: INTERNAL MEDICINE

## 2021-01-01 PROCEDURE — 85025 COMPLETE CBC W/AUTO DIFF WBC: CPT

## 2021-01-01 PROCEDURE — 83550 IRON BINDING TEST: CPT

## 2021-01-01 PROCEDURE — 99204 OFFICE O/P NEW MOD 45 MIN: CPT | Performed by: INTERNAL MEDICINE

## 2021-01-01 PROCEDURE — 80048 BASIC METABOLIC PNL TOTAL CA: CPT

## 2021-01-01 PROCEDURE — 81001 URINALYSIS AUTO W/SCOPE: CPT

## 2021-01-01 PROCEDURE — 82728 ASSAY OF FERRITIN: CPT

## 2021-01-01 PROCEDURE — 82043 UR ALBUMIN QUANTITATIVE: CPT

## 2021-01-01 PROCEDURE — 71250 CT THORAX DX C-: CPT

## 2021-01-01 RX ORDER — INSULIN GLARGINE 100 [IU]/ML
34 INJECTION, SOLUTION SUBCUTANEOUS DAILY
Qty: 32 ML | Refills: 1 | Status: SHIPPED | OUTPATIENT
Start: 2021-01-01 | End: 2022-01-01 | Stop reason: HOSPADM

## 2021-01-01 RX ORDER — AMLODIPINE BESYLATE 5 MG/1
5 TABLET ORAL DAILY
Qty: 90 TABLET | Refills: 1 | Status: SHIPPED | OUTPATIENT
Start: 2021-01-01 | End: 2022-01-01

## 2021-01-01 RX ORDER — INSULIN GLARGINE 100 [IU]/ML
34 INJECTION, SOLUTION SUBCUTANEOUS DAILY
Qty: 32 ML | Refills: 1 | Status: SHIPPED | OUTPATIENT
Start: 2021-01-01 | End: 2021-01-01 | Stop reason: SDUPTHER

## 2021-01-01 RX ORDER — LISINOPRIL 10 MG/1
10 TABLET ORAL DAILY
Qty: 90 TABLET | Refills: 1 | Status: SHIPPED | OUTPATIENT
Start: 2021-01-01 | End: 2021-01-01

## 2021-01-01 RX ORDER — ATORVASTATIN CALCIUM 20 MG/1
20 TABLET, FILM COATED ORAL DAILY
Qty: 90 TABLET | Refills: 3 | Status: SHIPPED | OUTPATIENT
Start: 2021-01-01 | End: 2022-01-01 | Stop reason: HOSPADM

## 2021-01-01 RX ORDER — AMLODIPINE BESYLATE 5 MG/1
5 TABLET ORAL DAILY
Qty: 90 TABLET | Refills: 1 | Status: SHIPPED | OUTPATIENT
Start: 2021-01-01 | End: 2021-01-01 | Stop reason: SDUPTHER

## 2021-01-01 RX ORDER — HYDROCHLOROTHIAZIDE 12.5 MG/1
12.5 TABLET ORAL DAILY
Qty: 90 TABLET | Refills: 3 | Status: SHIPPED | OUTPATIENT
Start: 2021-01-01 | End: 2022-01-01

## 2021-01-01 RX ORDER — LISINOPRIL 10 MG/1
10 TABLET ORAL DAILY
Qty: 90 TABLET | Refills: 1 | Status: SHIPPED | OUTPATIENT
Start: 2021-01-01 | End: 2021-01-01 | Stop reason: SDUPTHER

## 2021-01-01 RX ORDER — ASPIRIN 81 MG/1
81 TABLET ORAL DAILY
COMMUNITY
Start: 2021-01-01 | End: 2022-01-01 | Stop reason: HOSPADM

## 2021-01-01 RX ORDER — MELATONIN
2000 DAILY
COMMUNITY
End: 2022-01-01 | Stop reason: HOSPADM

## 2021-01-01 RX ORDER — PRAVASTATIN SODIUM 20 MG
20 TABLET ORAL DAILY
Qty: 90 TABLET | Refills: 1 | Status: SHIPPED | OUTPATIENT
Start: 2021-01-01 | End: 2021-01-01 | Stop reason: ALTCHOICE

## 2021-01-01 RX ORDER — PRAVASTATIN SODIUM 20 MG
20 TABLET ORAL DAILY
Qty: 90 TABLET | Refills: 1 | Status: SHIPPED | OUTPATIENT
Start: 2021-01-01 | End: 2021-01-01 | Stop reason: SDUPTHER

## 2021-01-01 RX ORDER — BIMATOPROST 0.01 %
DROPS OPHTHALMIC (EYE)
COMMUNITY
Start: 2021-01-01 | End: 2022-01-01 | Stop reason: HOSPADM

## 2021-01-01 RX ORDER — BRIMONIDINE TARTRATE 0.1 %
DROPS OPHTHALMIC (EYE)
COMMUNITY
End: 2022-01-01 | Stop reason: HOSPADM

## 2021-01-01 RX ORDER — LISINOPRIL 10 MG/1
TABLET ORAL
Qty: 90 TABLET | Refills: 1 | Status: SHIPPED | OUTPATIENT
Start: 2021-01-01 | End: 2022-01-01 | Stop reason: ALTCHOICE

## 2021-01-01 RX ORDER — LISINOPRIL 10 MG/1
TABLET ORAL
Qty: 90 TABLET | Refills: 1 | Status: SHIPPED | OUTPATIENT
Start: 2021-01-01 | End: 2021-01-01

## 2021-01-11 ENCOUNTER — LAB (OUTPATIENT)
Dept: LAB | Facility: CLINIC | Age: 86
End: 2021-01-11
Payer: MEDICARE

## 2021-01-11 DIAGNOSIS — R80.9 DIABETES MELLITUS WITH MICROALBUMINURIA (HCC): ICD-10-CM

## 2021-01-11 DIAGNOSIS — E11.29 DIABETES MELLITUS WITH MICROALBUMINURIA (HCC): ICD-10-CM

## 2021-01-11 DIAGNOSIS — D64.9 ANEMIA, UNSPECIFIED TYPE: ICD-10-CM

## 2021-01-11 DIAGNOSIS — N18.30 CKD (CHRONIC KIDNEY DISEASE), STAGE III (HCC): ICD-10-CM

## 2021-01-11 DIAGNOSIS — E78.2 MIXED HYPERLIPIDEMIA: ICD-10-CM

## 2021-01-11 LAB
ALBUMIN SERPL BCP-MCNC: 3.7 G/DL (ref 3.5–5)
ALP SERPL-CCNC: 70 U/L (ref 46–116)
ALT SERPL W P-5'-P-CCNC: 22 U/L (ref 12–78)
ANION GAP SERPL CALCULATED.3IONS-SCNC: 3 MMOL/L (ref 4–13)
AST SERPL W P-5'-P-CCNC: 21 U/L (ref 5–45)
BASOPHILS # BLD AUTO: 0.06 THOUSANDS/ΜL (ref 0–0.1)
BASOPHILS NFR BLD AUTO: 1 % (ref 0–1)
BILIRUB SERPL-MCNC: 0.29 MG/DL (ref 0.2–1)
BUN SERPL-MCNC: 34 MG/DL (ref 5–25)
CALCIUM SERPL-MCNC: 10.5 MG/DL (ref 8.3–10.1)
CHLORIDE SERPL-SCNC: 112 MMOL/L (ref 100–108)
CO2 SERPL-SCNC: 26 MMOL/L (ref 21–32)
CREAT SERPL-MCNC: 1.44 MG/DL (ref 0.6–1.3)
CREAT UR-MCNC: 84.5 MG/DL
EOSINOPHIL # BLD AUTO: 0.18 THOUSAND/ΜL (ref 0–0.61)
EOSINOPHIL NFR BLD AUTO: 2 % (ref 0–6)
ERYTHROCYTE [DISTWIDTH] IN BLOOD BY AUTOMATED COUNT: 13.9 % (ref 11.6–15.1)
EST. AVERAGE GLUCOSE BLD GHB EST-MCNC: 160 MG/DL
FERRITIN SERPL-MCNC: 36 NG/ML (ref 8–388)
GFR SERPL CREATININE-BSD FRML MDRD: 32 ML/MIN/1.73SQ M
GLUCOSE P FAST SERPL-MCNC: 92 MG/DL (ref 65–99)
HBA1C MFR BLD: 7.2 %
HCT VFR BLD AUTO: 38 % (ref 34.8–46.1)
HGB BLD-MCNC: 11.3 G/DL (ref 11.5–15.4)
IMM GRANULOCYTES # BLD AUTO: 0.07 THOUSAND/UL (ref 0–0.2)
IMM GRANULOCYTES NFR BLD AUTO: 1 % (ref 0–2)
IRON SERPL-MCNC: 68 UG/DL (ref 50–170)
LYMPHOCYTES # BLD AUTO: 2.45 THOUSANDS/ΜL (ref 0.6–4.47)
LYMPHOCYTES NFR BLD AUTO: 22 % (ref 14–44)
MCH RBC QN AUTO: 27.8 PG (ref 26.8–34.3)
MCHC RBC AUTO-ENTMCNC: 29.7 G/DL (ref 31.4–37.4)
MCV RBC AUTO: 94 FL (ref 82–98)
MICROALBUMIN UR-MCNC: 13.7 MG/L (ref 0–20)
MICROALBUMIN/CREAT 24H UR: 16 MG/G CREATININE (ref 0–30)
MONOCYTES # BLD AUTO: 0.64 THOUSAND/ΜL (ref 0.17–1.22)
MONOCYTES NFR BLD AUTO: 6 % (ref 4–12)
NEUTROPHILS # BLD AUTO: 7.55 THOUSANDS/ΜL (ref 1.85–7.62)
NEUTS SEG NFR BLD AUTO: 68 % (ref 43–75)
NRBC BLD AUTO-RTO: 0 /100 WBCS
PLATELET # BLD AUTO: 291 THOUSANDS/UL (ref 149–390)
PMV BLD AUTO: 11.6 FL (ref 8.9–12.7)
POTASSIUM SERPL-SCNC: 5.1 MMOL/L (ref 3.5–5.3)
PROT SERPL-MCNC: 7.8 G/DL (ref 6.4–8.2)
RBC # BLD AUTO: 4.06 MILLION/UL (ref 3.81–5.12)
SODIUM SERPL-SCNC: 141 MMOL/L (ref 136–145)
WBC # BLD AUTO: 10.95 THOUSAND/UL (ref 4.31–10.16)

## 2021-01-11 PROCEDURE — 82043 UR ALBUMIN QUANTITATIVE: CPT

## 2021-01-11 PROCEDURE — 83540 ASSAY OF IRON: CPT

## 2021-01-11 PROCEDURE — 82570 ASSAY OF URINE CREATININE: CPT

## 2021-01-11 PROCEDURE — 36415 COLL VENOUS BLD VENIPUNCTURE: CPT

## 2021-01-11 PROCEDURE — 80053 COMPREHEN METABOLIC PANEL: CPT

## 2021-01-11 PROCEDURE — 82728 ASSAY OF FERRITIN: CPT

## 2021-01-11 PROCEDURE — 83036 HEMOGLOBIN GLYCOSYLATED A1C: CPT

## 2021-01-11 PROCEDURE — 85025 COMPLETE CBC W/AUTO DIFF WBC: CPT

## 2021-01-15 ENCOUNTER — OFFICE VISIT (OUTPATIENT)
Dept: INTERNAL MEDICINE CLINIC | Facility: CLINIC | Age: 86
End: 2021-01-15
Payer: MEDICARE

## 2021-01-15 VITALS
BODY MASS INDEX: 31.34 KG/M2 | TEMPERATURE: 97 F | DIASTOLIC BLOOD PRESSURE: 68 MMHG | RESPIRATION RATE: 16 BRPM | WEIGHT: 166 LBS | OXYGEN SATURATION: 96 % | SYSTOLIC BLOOD PRESSURE: 124 MMHG | HEART RATE: 79 BPM | HEIGHT: 61 IN

## 2021-01-15 DIAGNOSIS — Z00.00 MEDICARE ANNUAL WELLNESS VISIT, SUBSEQUENT: ICD-10-CM

## 2021-01-15 DIAGNOSIS — I10 BENIGN ESSENTIAL HTN: ICD-10-CM

## 2021-01-15 DIAGNOSIS — I10 ESSENTIAL HYPERTENSION: Chronic | ICD-10-CM

## 2021-01-15 DIAGNOSIS — R80.9 DIABETES MELLITUS WITH MICROALBUMINURIA (HCC): Primary | ICD-10-CM

## 2021-01-15 DIAGNOSIS — D63.1 ANEMIA DUE TO STAGE 3B CHRONIC KIDNEY DISEASE (HCC): ICD-10-CM

## 2021-01-15 DIAGNOSIS — E11.29 DIABETES MELLITUS WITH MICROALBUMINURIA (HCC): Primary | ICD-10-CM

## 2021-01-15 DIAGNOSIS — N18.32 ANEMIA DUE TO STAGE 3B CHRONIC KIDNEY DISEASE (HCC): ICD-10-CM

## 2021-01-15 DIAGNOSIS — E78.2 MIXED HYPERLIPIDEMIA: ICD-10-CM

## 2021-01-15 DIAGNOSIS — E83.52 HYPERCALCEMIA: ICD-10-CM

## 2021-01-15 PROBLEM — L98.9 SKIN LESION OF FACE: Status: RESOLVED | Noted: 2018-11-16 | Resolved: 2021-01-15

## 2021-01-15 PROBLEM — S81.812A SKIN TEAR OF LEFT LOWER LEG WITHOUT COMPLICATION: Status: RESOLVED | Noted: 2020-09-11 | Resolved: 2021-01-15

## 2021-01-15 PROCEDURE — G0439 PPPS, SUBSEQ VISIT: HCPCS | Performed by: INTERNAL MEDICINE

## 2021-01-15 PROCEDURE — 99214 OFFICE O/P EST MOD 30 MIN: CPT | Performed by: INTERNAL MEDICINE

## 2021-01-15 PROCEDURE — 1123F ACP DISCUSS/DSCN MKR DOCD: CPT | Performed by: INTERNAL MEDICINE

## 2021-01-15 RX ORDER — PRAVASTATIN SODIUM 20 MG
20 TABLET ORAL DAILY
Qty: 90 TABLET | Refills: 1 | Status: SHIPPED | OUTPATIENT
Start: 2021-01-15 | End: 2021-01-01 | Stop reason: SDUPTHER

## 2021-01-15 RX ORDER — HYDROCHLOROTHIAZIDE 12.5 MG/1
12.5 TABLET ORAL DAILY
Qty: 90 TABLET | Refills: 1 | Status: SHIPPED | OUTPATIENT
Start: 2021-01-15 | End: 2021-01-01 | Stop reason: SDUPTHER

## 2021-01-15 RX ORDER — AMLODIPINE BESYLATE 5 MG/1
5 TABLET ORAL DAILY
Qty: 90 TABLET | Refills: 1 | Status: SHIPPED | OUTPATIENT
Start: 2021-01-15 | End: 2021-01-01 | Stop reason: SDUPTHER

## 2021-01-15 RX ORDER — BRINZOLAMIDE 1 %
SUSPENSION, DROPS(FINAL DOSAGE FORM)(ML) OPHTHALMIC (EYE) 3 TIMES DAILY
COMMUNITY
Start: 2020-10-27 | End: 2022-01-01 | Stop reason: HOSPADM

## 2021-01-15 RX ORDER — LISINOPRIL 10 MG/1
10 TABLET ORAL DAILY
Qty: 90 TABLET | Refills: 1 | Status: SHIPPED | OUTPATIENT
Start: 2021-01-15 | End: 2021-01-01 | Stop reason: SDUPTHER

## 2021-01-15 NOTE — PROGRESS NOTES
Assessment/Plan:    Problem List Items Addressed This Visit        Endocrine    Diabetes mellitus with microalbuminuria ([facility] [address] ) - Primary     -DM2 with CKD and microalbuminuria  A1c overall stable at 7 2  Lab Results   Component Value Date    HGBA1C 7 2 (H) 01/11/2021   -blood sugars reviewed, no hypoglycemia noted  -continue on basaglar 34units daily  -continue ACEI  -foot exam performed         Relevant Orders    Hemoglobin A1C    Microalbumin / creatinine urine ratio       Cardiovascular and Mediastinum    Essential hypertension (Chronic)     -stable, normotensive  -continue on amlodipine 5mg daily, hctz 12 5mg daily and lisinopril 10mg daily  -monitor bmp         Relevant Medications    lisinopril (ZESTRIL) 10 mg tablet    hydrochlorothiazide (HYDRODIURIL) 12 5 mg tablet    amLODIPine (NORVASC) 5 mg tablet    Other Relevant Orders    Comprehensive metabolic panel       Other    Anemia     -iron and ferritin normal  -has underlying CKD3  -hb stable         Hyperlipidemia     -statin renewed         Relevant Medications    pravastatin (PRAVACHOL) 20 mg tablet    Other Relevant Orders    Lipid panel      Other Visit Diagnoses     Hypercalcemia        -mild  -advised to take calcium supplement every other day instead  -recheck level    Relevant Orders    Comprehensive metabolic panel    Benign essential HTN        Relevant Medications    lisinopril (ZESTRIL) 10 mg tablet    hydrochlorothiazide (HYDRODIURIL) 12 5 mg tablet    amLODIPine (NORVASC) 5 mg tablet    Other Relevant Orders    TSH, 3rd generation with Free T4 reflex    Medicare annual wellness visit, subsequent              Subjective:      Patient ID: Renata Serrano is a 80 y o  female  81yo female with DM2 with CKD and microalbuminuria, chronic GERD, HLD, HTN, bladder CA, RLL lung CA s/p lobectomy and h/o lymphoma here for follow up care  Diabetes  She presents for her follow-up diabetic visit  She has type 2 diabetes mellitus   Her disease course has been stable  There are no hypoglycemic associated symptoms  Pertinent negatives for hypoglycemia include no dizziness, headaches or nervousness/anxiousness  Pertinent negatives for diabetes include no chest pain and no fatigue  (Microalbuminuria, CKD) Risk factors for coronary artery disease include obesity, hypertension, diabetes mellitus and dyslipidemia  Current diabetic treatment includes insulin injections  (-160s) An ACE inhibitor/angiotensin II receptor blocker is being taken  Eye exam is current  Hypertension  This is a chronic problem  The current episode started more than 1 year ago  Pertinent negatives include no chest pain, headaches, palpitations or shortness of breath  Risk factors for coronary artery disease include obesity, post-menopausal state, sedentary lifestyle, diabetes mellitus and dyslipidemia  Past treatments include calcium channel blockers, ACE inhibitors and diuretics  Hypertensive end-organ damage includes kidney disease  She has not been taking pravastatin as supply was mailed out in Dec but has yet to receive it  The following portions of the patient's history were reviewed and updated as appropriate: allergies, current medications, past family history, past medical history, past social history, past surgical history and problem list       Current Outpatient Medications:     acetaminophen (TYLENOL) 325 mg tablet, Take 2 tablets (650 mg total) by mouth every 6 (six) hours as needed for mild pain, headaches or fever  , Disp: 30 tablet, Rfl: 0    Alphagan P 0 1 %, 1 drop every 8 (eight) hours , Disp: , Rfl:     amLODIPine (NORVASC) 5 mg tablet, Take 1 tablet (5 mg total) by mouth daily, Disp: 90 tablet, Rfl: 1    Azopt 1 % ophthalmic suspension, 3 (three) times a day , Disp: , Rfl:     calcium carbonate (OS-DEMETRIA) 1250 (500 Ca) MG tablet, Take 1 tablet by mouth daily, Disp: , Rfl:     calcium citrate-vitamin D (CITRACAL+D) 315-200 MG-UNIT per tablet, Take 1 tablet by mouth daily  , Disp: , Rfl:     Cyanocobalamin (VITAMIN B-12 PO), Take 1 tablet by mouth , Disp: , Rfl:     hydrochlorothiazide (HYDRODIURIL) 12 5 mg tablet, Take 1 tablet (12 5 mg total) by mouth daily, Disp: 90 tablet, Rfl: 1    insulin glargine (BASAGLAR KWIKPEN) 100 units/mL injection pen, Inject 34 Units under the skin daily, Disp: 135 mL, Rfl: 1    Insulin Pen Needle (BD ULTRA-FINE PEN NEEDLES) 29G X 12 7MM MISC, by Does not apply route 2 (two) times a day, Disp: 180 each, Rfl: 1    lisinopril (ZESTRIL) 10 mg tablet, Take 1 tablet (10 mg total) by mouth daily, Disp: 90 tablet, Rfl: 1    Multiple Vitamin (MULTIVITAMIN) tablet, Take 1 tablet by mouth daily  , Disp: , Rfl:     omeprazole (PriLOSEC) 20 mg delayed release capsule, Take 20 mg by mouth daily  , Disp: , Rfl:     pravastatin (PRAVACHOL) 20 mg tablet, Take 1 tablet (20 mg total) by mouth daily, Disp: 90 tablet, Rfl: 1    Review of Systems   Constitutional: Negative for activity change, appetite change, fatigue and unexpected weight change  HENT: Negative for congestion, postnasal drip, rhinorrhea and sore throat  Respiratory: Negative for cough, chest tightness, shortness of breath and wheezing  Cardiovascular: Positive for leg swelling (minimal)  Negative for chest pain and palpitations  Gastrointestinal: Negative for abdominal pain, constipation, diarrhea, nausea and vomiting  Genitourinary: Negative for difficulty urinating  Occasional nocturia   Musculoskeletal: Positive for arthralgias, back pain (with prolonged standing) and gait problem (ambulates with a cane)  Negative for myalgias  Neurological: Negative for dizziness, numbness and headaches  Psychiatric/Behavioral: Positive for sleep disturbance (fragmented)  Negative for decreased concentration and dysphoric mood  The patient is not nervous/anxious            Objective:    /68 (BP Location: Left arm)   Pulse 79   Temp (!) 97 °F (36 1 °C)   Resp 16   Ht 5' 1" (1 549 m)   Wt 75 3 kg (166 lb)   SpO2 96%   BMI 31 37 kg/m²      Physical Exam  Vitals signs reviewed  Constitutional:       General: She is not in acute distress  Appearance: Normal appearance  She is obese  She is not ill-appearing  HENT:      Head: Normocephalic  Right Ear: Tympanic membrane, ear canal and external ear normal  There is no impacted cerumen  Left Ear: Tympanic membrane, ear canal and external ear normal  There is no impacted cerumen  Nose: Nose normal  No congestion or rhinorrhea  Mouth/Throat:      Mouth: Mucous membranes are moist       Pharynx: Oropharynx is clear  No oropharyngeal exudate or posterior oropharyngeal erythema  Comments: Wears dentures  Eyes:      Extraocular Movements: Extraocular movements intact  Conjunctiva/sclera: Conjunctivae normal       Pupils: Pupils are equal, round, and reactive to light  Neck:      Musculoskeletal: Neck supple  No muscular tenderness  Thyroid: No thyromegaly or thyroid tenderness  Vascular: No carotid bruit  Cardiovascular:      Rate and Rhythm: Normal rate and regular rhythm  Pulses: Normal pulses  no weak pulses          Dorsalis pedis pulses are 2+ on the right side and 2+ on the left side  Posterior tibial pulses are 2+ on the right side and 2+ on the left side  Heart sounds: Normal heart sounds  Pulmonary:      Effort: Pulmonary effort is normal  No respiratory distress  Breath sounds: Normal breath sounds  No wheezing  Abdominal:      General: Bowel sounds are normal  There is no distension  Palpations: Abdomen is soft  Tenderness: There is no abdominal tenderness  Musculoskeletal:      Right lower leg: No edema  Left lower leg: Edema (ankle edema) present  Feet:      Right foot:      Skin integrity: Callus and dry skin present  Left foot:      Skin integrity: Callus and dry skin present     Lymphadenopathy:      Cervical: No cervical adenopathy  Skin:     General: Skin is dry  Neurological:      General: No focal deficit present  Mental Status: She is alert and oriented to person, place, and time  Psychiatric:         Attention and Perception: Attention normal          Mood and Affect: Mood and affect normal          Speech: Speech normal          Behavior: Behavior is cooperative  Patient's shoes and socks removed  Right Foot/Ankle   Right Foot Inspection  Skin Exam: skin intact, dry skin, callus and callus                          Toe Exam: right toe deformity (all toes with onychomycosis)  Sensory       Monofilament testing: intact  Vascular    The right DP pulse is 2+  The right PT pulse is 2+  Left Foot/Ankle  Left Foot Inspection  Skin Exam: dry skin and callus                         Toe Exam: ROM and strength within normal limits                   Sensory       Monofilament: intact  Vascular    The left DP pulse is 2+  The left PT pulse is 2+  Assign Risk Category:  Deformity present; No loss of protective sensation;  No weak pulses       Risk: 1      Recent Results (from the past 672 hour(s))   CBC and differential    Collection Time: 01/11/21  8:24 AM   Result Value Ref Range    WBC 10 95 (H) 4 31 - 10 16 Thousand/uL    RBC 4 06 3 81 - 5 12 Million/uL    Hemoglobin 11 3 (L) 11 5 - 15 4 g/dL    Hematocrit 38 0 34 8 - 46 1 %    MCV 94 82 - 98 fL    MCH 27 8 26 8 - 34 3 pg    MCHC 29 7 (L) 31 4 - 37 4 g/dL    RDW 13 9 11 6 - 15 1 %    MPV 11 6 8 9 - 12 7 fL    Platelets 413 406 - 498 Thousands/uL    nRBC 0 /100 WBCs    Neutrophils Relative 68 43 - 75 %    Immat GRANS % 1 0 - 2 %    Lymphocytes Relative 22 14 - 44 %    Monocytes Relative 6 4 - 12 %    Eosinophils Relative 2 0 - 6 %    Basophils Relative 1 0 - 1 %    Neutrophils Absolute 7 55 1 85 - 7 62 Thousands/µL    Immature Grans Absolute 0 07 0 00 - 0 20 Thousand/uL    Lymphocytes Absolute 2 45 0 60 - 4 47 Thousands/µL    Monocytes Absolute 0 64 0 17 - 1 22 Thousand/µL    Eosinophils Absolute 0 18 0 00 - 0 61 Thousand/µL    Basophils Absolute 0 06 0 00 - 0 10 Thousands/µL   Ferritin    Collection Time: 01/11/21  8:24 AM   Result Value Ref Range    Ferritin 36 8 - 388 ng/mL   Iron    Collection Time: 01/11/21  8:24 AM   Result Value Ref Range    Iron 68 50 - 170 ug/dL   Comprehensive metabolic panel    Collection Time: 01/11/21  8:24 AM   Result Value Ref Range    Sodium 141 136 - 145 mmol/L    Potassium 5 1 3 5 - 5 3 mmol/L    Chloride 112 (H) 100 - 108 mmol/L    CO2 26 21 - 32 mmol/L    ANION GAP 3 (L) 4 - 13 mmol/L    BUN 34 (H) 5 - 25 mg/dL    Creatinine 1 44 (H) 0 60 - 1 30 mg/dL    Glucose, Fasting 92 65 - 99 mg/dL    Calcium 10 5 (H) 8 3 - 10 1 mg/dL    AST 21 5 - 45 U/L    ALT 22 12 - 78 U/L    Alkaline Phosphatase 70 46 - 116 U/L    Total Protein 7 8 6 4 - 8 2 g/dL    Albumin 3 7 3 5 - 5 0 g/dL    Total Bilirubin 0 29 0 20 - 1 00 mg/dL    eGFR 32 ml/min/1 73sq m   Hemoglobin A1C    Collection Time: 01/11/21  8:24 AM   Result Value Ref Range    Hemoglobin A1C 7 2 (H) Normal 3 8-5 6%; PreDiabetic 5 7-6 4%; Diabetic >=6 5%; Glycemic control for adults with diabetes <7 0% %     mg/dl   Microalbumin / creatinine urine ratio    Collection Time: 01/11/21  8:24 AM   Result Value Ref Range    Creatinine, Ur 84 5 mg/dL    Microalbum  ,U,Random 13 7 0 0 - 20 0 mg/L    Microalb Creat Ratio 16 0 - 30 mg/g creatinine

## 2021-01-15 NOTE — PROGRESS NOTES
Assessment and Plan:     Problem List Items Addressed This Visit     None      Visit Diagnoses     Medicare annual wellness visit, subsequent    -  Primary           Preventive health issues were discussed with patient, and age appropriate screening tests were ordered as noted in patient's After Visit Summary  Personalized health advice and appropriate referrals for health education or preventive services given if needed, as noted in patient's After Visit Summary       History of Present Illness:     Patient presents for Medicare Annual Wellness visit    Patient Care Team:  Bala Lucas DO as PCP - MD Brooke Silveira MD as Surgeon (Thoracic Surgery)  Airam Lowe MD (Urology)  Travis Motta MD (Dermatology)     Problem List:     Patient Active Problem List   Diagnosis    Essential hypertension    History of lung cancer    Adenocarcinoma of lung, right ([facility] [address] [facility]    Anemia    Chronic GERD    Diabetes mellitus with microalbuminuria ([address]    Diffuse large B cell lymphoma ([facility] [address] [facility]    Glaucoma    Hyperlipidemia    Skin lesion of face    Age-related osteoporosis without current pathological fracture    CKD (chronic kidney disease), stage III    Acute pain of right knee    Skin tear of left lower leg without complication      Past Medical and Surgical History:     Past Medical History:   Diagnosis Date    Bladder cancer ([facility] [address] [facility]     Diabetes mellitus ([facility] [address] [facility]     GERD without esophagitis     Hyperlipidemia     last assessed 4/12/13    Hypertension     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[facility] [address] [facility]     last assessed  7/12/13    Liver cancer ([facility] [address] [facility]     Lung cancer ([facility] [address] [facility]     Squamous Cell Lung CA -Right     Lymphoma ([facility] [address] [facility]     Non Hodgkin's lymphoma ([facility] [address] [facility]     last assessed 1/20/14    Osteoporosis     last assessed  4/12/13    Right cataract     last assessed  3/2/15     Past Surgical History:   Procedure Laterality Date    CATARACT EXTRACTION      LIVER LOBECTOMY      LUNG REMOVAL, PARTIAL Right lobectomy - onset approx 3/12/12 - right lower lobe      Family History:     Family History   Problem Relation Age of Onset    Brain cancer Mother     Other Mother         adenocarcinoma of the accessor sinuses    Diabetes Father     Leukemia Father     Cirrhosis Sister         Alcoholic's cirrhosis - Laennec's      Social History:        Social History     Socioeconomic History    Marital status:       Spouse name: None    Number of children: None    Years of education: None    Highest education level: None   Occupational History    Occupation: retired     Comment:    Social Needs    Financial resource strain: None    Food insecurity     Worry: None     Inability: None    Transportation needs     Medical: None     Non-medical: None   Tobacco Use    Smoking status: Former Smoker     Packs/day: 2 00     Years: 40 00     Pack years: 80 00     Quit date:      Years since quittin 0    Smokeless tobacco: Never Used    Tobacco comment: 60 pack year hx, Quit 15 yrs ago  as per [facility]   Substance and Sexual Activity    Alcohol use: No    Drug use: No    Sexual activity: None   Lifestyle    Physical activity     Days per week: None     Minutes per session: None    Stress: None   Relationships    Social connections     Talks on phone: None     Gets together: None     Attends Mandaeism service: None     Active member of club or organization: None     Attends meetings of clubs or organizations: None     Relationship status: None    Intimate partner violence     Fear of current or ex partner: None     Emotionally abused: None     Physically abused: None     Forced sexual activity: None   Other Topics Concern    None   Social History Narrative    None      Medications and Allergies:     Current Outpatient Medications   Medication Sig Dispense Refill    acetaminophen (TYLENOL) 325 mg tablet Take 2 tablets (650 mg total) by mouth every 6 (six) hours as needed for mild pain, headaches or fever  30 tablet 0    Alphagan P 0 1 % 1 drop every 8 (eight) hours       amLODIPine (NORVASC) 5 mg tablet TAKE 1 TABLET DAILY 90 tablet 1    Azopt 1 % ophthalmic suspension 3 (three) times a day       calcium carbonate (OS-DEMETRIA) 1250 (500 Ca) MG tablet Take 1 tablet by mouth daily      calcium citrate-vitamin D (CITRACAL+D) 315-200 MG-UNIT per tablet Take 1 tablet by mouth daily        Cyanocobalamin (VITAMIN B-12 PO) Take 1 tablet by mouth   hydrochlorothiazide (HYDRODIURIL) 12 5 mg tablet TAKE 1 TABLET DAILY 90 tablet 1    insulin glargine (BASAGLAR KWIKPEN) 100 units/mL injection pen Inject 34 Units under the skin daily 135 mL 1    Insulin Pen Needle (Sicubo ULTRA-FINE PEN NEEDLES) 29G X 12 7MM MISC by Does not apply route 2 (two) times a day 180 each 1    lisinopril (ZESTRIL) 10 mg tablet TAKE 1 TABLET DAILY 90 tablet 1    Multiple Vitamin (MULTIVITAMIN) tablet Take 1 tablet by mouth daily   omeprazole (PriLOSEC) 20 mg delayed release capsule Take 20 mg by mouth daily   pravastatin (PRAVACHOL) 20 mg tablet TAKE 1 TABLET DAILY 90 tablet 1     No current facility-administered medications for this visit  Allergies   Allergen Reactions    Eye Drops [Tetrahydrozoline] Eye Swelling     Patient states when she went to the eye doctor and received the eye dilation solution it irritated her eyes      Immunizations:     Immunization History   Administered Date(s) Administered    INFLUENZA 09/19/2016, 09/13/2017, 09/06/2018, 09/05/2019    Influenza Split High Dose Preservative Free IM 09/01/2013, 10/06/2014, 10/05/2015, 09/19/2016, 09/05/2019    Influenza, high dose seasonal 0 7 mL 09/11/2020    Influenza, seasonal, injectable 09/13/2017    Pneumococcal Conjugate 13-Valent 12/01/2014    Pneumococcal Polysaccharide PPV23 [birth year]31    Tdap 12/01/2014      Health Maintenance: There are no preventive care reminders to display for this patient        Topic Date Due  Pneumococcal Vaccine: 65+ Years (2 of 2 - PPSV23) 12/01/2015      Medicare Health Risk Assessment:     /68 (BP Location: Left arm)   Pulse 79   Temp (!) 97 °F (36 1 °C)   Resp 16   Ht 5' 1" (1 549 m)   Wt 75 3 kg (166 lb)   SpO2 96%   BMI 31 37 kg/m²      Frank Gabriel is here for her Subsequent Wellness visit  Last Medicare Wellness visit information reviewed, patient interviewed and updates made to the record today  Health Risk Assessment:   Patient rates overall health as very good  Patient feels that their physical health rating is same  Eyesight was rated as same  Hearing was rated as same  Patient feels that their emotional and mental health rating is same  Pain experienced in the last 7 days has been none  Patient states that she has experienced weight loss or gain in last 6 months  Depression Screening:   PHQ-2 Score: 0      Fall Risk Screening: In the past year, patient has experienced: no history of falling in past year      Urinary Incontinence Screening:   Patient has not leaked urine accidently in the last six months  Home Safety:  Patient does not have trouble with stairs inside or outside of their home  Patient has working smoke alarms and has working carbon monoxide detector  Home safety hazards include: none  Nutrition:   Current diet is Diabetic and Regular  Medications:   Patient is currently taking over-the-counter supplements  OTC medications include: see medication list  Patient is able to manage medications  Activities of Daily Living (ADLs)/Instrumental Activities of Daily Living (IADLs):   Walk and transfer into and out of bed and chair?: Yes  Dress and groom yourself?: Yes    Bathe or shower yourself?: Yes    Feed yourself?  Yes  Do your laundry/housekeeping?: Yes  Manage your money, pay your bills and track your expenses?: Yes  Make your own meals?: Yes    Do your own shopping?: Yes    Durable Medical Equipment Suppliers  patient does not know    Previous Hospitalizations:   Any hospitalizations or ED visits within the last 12 months?: No      Advance Care Planning:   Living will: Yes    Durable POA for healthcare: Yes    Advanced directive: Yes    Advanced directive counseling given: No      Cognitive Screening:   Provider or family/friend/caregiver concerned regarding cognition?: No    PREVENTIVE SCREENINGS      Cardiovascular Screening:    General: Screening Not Indicated and History Lipid Disorder      Diabetes Screening:     General: Screening Not Indicated and History Diabetes      Colorectal Cancer Screening:     General: Screening Not Indicated      Breast Cancer Screening:     General: Screening Not Indicated      Cervical Cancer Screening:    General: Screening Not Indicated      Osteoporosis Screening:    General: Screening Not Indicated and History Osteoporosis      Abdominal Aortic Aneurysm (AAA) Screening:        General: Screening Not Indicated      Lung Cancer Screening:     General: Screening Not Indicated and History Lung Cancer      Hepatitis C Screening:    General: Screening Not Indicated    Other Counseling Topics:   Car/seat belt/driving safety, sunscreen and regular weightbearing exercise and calcium and vitamin D intake  Immunizations discussed  Consider caitlyn Bonner DO

## 2021-01-15 NOTE — ASSESSMENT & PLAN NOTE
-stable, normotensive  -continue on amlodipine 5mg daily, hctz 12 5mg daily and lisinopril 10mg daily  -monitor bmp

## 2021-01-15 NOTE — PATIENT INSTRUCTIONS
Medicare Preventive Visit Patient Instructions  Thank you for completing your Welcome to Medicare Visit or Medicare Annual Wellness Visit today  Your next wellness visit will be due in one year (1/15/2022)  The screening/preventive services that you may require over the next 5-10 years are detailed below  Some tests may not apply to you based off risk factors and/or age  Screening tests ordered at today's visit but not completed yet may show as past due  Also, please note that scanned in results may not display below  Preventive Screenings:  Service Recommendations Previous Testing/Comments   Colorectal Cancer Screening  * Colonoscopy    * Fecal Occult Blood Test (FOBT)/Fecal Immunochemical Test (FIT)  * Fecal DNA/Cologuard Test  * Flexible Sigmoidoscopy Age: 54-65 years old   Colonoscopy: every 10 years (may be performed more frequently if at higher risk)  OR  FOBT/FIT: every 1 year  OR  Cologuard: every 3 years  OR  Sigmoidoscopy: every 5 years  Screening may be recommended earlier than age 48 if at higher risk for colorectal cancer  Also, an individualized decision between you and your healthcare provider will decide whether screening between the ages of 74-80 would be appropriate  Colonoscopy: Not on file  FOBT/FIT: Not on file  Cologuard: Not on file  Sigmoidoscopy: Not on file    Screening Not Indicated     Breast Cancer Screening Age: 36 years old  Frequency: every 1-2 years  Not required if history of left and right mastectomy Mammogram: Not on file       Cervical Cancer Screening Between the ages of 21-29, pap smear recommended once every 3 years  Between the ages of 33-67, can perform pap smear with HPV co-testing every 5 years     Recommendations may differ for women with a history of total hysterectomy, cervical cancer, or abnormal pap smears in past  Pap Smear: Not on file    Screening Not Indicated   Hepatitis C Screening Once for adults born between 1945 and 1965  More frequently in patients at high risk for Hepatitis C Hep C Antibody: Not on file       Diabetes Screening 1-2 times per year if you're at risk for diabetes or have pre-diabetes Fasting glucose: 92 mg/dL   A1C: 7 2 %    Screening Not Indicated  History Diabetes   Cholesterol Screening Once every 5 years if you don't have a lipid disorder  May order more often based on risk factors  Lipid panel: 09/08/2020    Screening Not Indicated  History Lipid Disorder     Other Preventive Screenings Covered by Medicare:  1  Abdominal Aortic Aneurysm (AAA) Screening: covered once if your at risk  You're considered to be at risk if you have a family history of AAA  2  Lung Cancer Screening: covers low dose CT scan once per year if you meet all of the following conditions: (1) Age 50-69; (2) No signs or symptoms of lung cancer; (3) Current smoker or have quit smoking within the last 15 years; (4) You have a tobacco smoking history of at least 30 pack years (packs per day multiplied by number of years you smoked); (5) You get a written order from a healthcare provider  3  Glaucoma Screening: covered annually if you're considered high risk: (1) You have diabetes OR (2) Family history of glaucoma OR (3)  aged 48 and older OR (3)  American aged 72 and older  3  Osteoporosis Screening: covered every 2 years if you meet one of the following conditions: (1) You're estrogen deficient and at risk for osteoporosis based off medical history and other findings; (2) Have a vertebral abnormality; (3) On glucocorticoid therapy for more than 3 months; (4) Have primary hyperparathyroidism; (5) On osteoporosis medications and need to assess response to drug therapy  · Last bone density test (DXA Scan): 02/25/2019   5  HIV Screening: covered annually if you're between the age of 15-65  Also covered annually if you are younger than 13 and older than 72 with risk factors for HIV infection   For pregnant patients, it is covered up to 3 times per pregnancy  Immunizations:  Immunization Recommendations   Influenza Vaccine Annual influenza vaccination during flu season is recommended for all persons aged >= 6 months who do not have contraindications   Pneumococcal Vaccine (Prevnar and Pneumovax)  * Prevnar = PCV13  * Pneumovax = PPSV23   Adults 25-60 years old: 1-3 doses may be recommended based on certain risk factors  Adults 72 years old: Prevnar (PCV13) vaccine recommended followed by Pneumovax (PPSV23) vaccine  If already received PPSV23 since turning 65, then PCV13 recommended at least one year after PPSV23 dose  Hepatitis B Vaccine 3 dose series if at intermediate or high risk (ex: diabetes, end stage renal disease, liver disease)   Tetanus (Td) Vaccine - COST NOT COVERED BY MEDICARE PART B Following completion of primary series, a booster dose should be given every 10 years to maintain immunity against tetanus  Td may also be given as tetanus wound prophylaxis  Tdap Vaccine - COST NOT COVERED BY MEDICARE PART B Recommended at least once for all adults  For pregnant patients, recommended with each pregnancy  Shingles Vaccine (Shingrix) - COST NOT COVERED BY MEDICARE PART B  2 shot series recommended in those aged 48 and above     Health Maintenance Due:  There are no preventive care reminders to display for this patient  Immunizations Due:      Topic Date Due    Pneumococcal Vaccine: 65+ Years (2 of 2 - PPSV23) 12/01/2015     Advance Directives   What are advance directives? Advance directives are legal documents that state your wishes and plans for medical care  These plans are made ahead of time in case you lose your ability to make decisions for yourself  Advance directives can apply to any medical decision, such as the treatments you want, and if you want to donate organs  What are the types of advance directives? There are many types of advance directives, and each state has rules about how to use them   You may choose a combination of any of the following:  · Living will: This is a written record of the treatment you want  You can also choose which treatments you do not want, which to limit, and which to stop at a certain time  This includes surgery, medicine, IV fluid, and tube feedings  · Durable power of  for healthcare [place] [facility]): This is a written record that states who you want to make healthcare choices for you when you are unable to make them for yourself  This person, called a proxy, is usually a family member or a friend  You may choose more than 1 proxy  · Do not resuscitate (DNR) order:  A DNR order is used in case your heart stops beating or you stop breathing  It is a request not to have certain forms of treatment, such as CPR  A DNR order may be included in other types of advance directives  · Medical directive: This covers the care that you want if you are in a coma, near death, or unable to make decisions for yourself  You can list the treatments you want for each condition  Treatment may include pain medicine, surgery, blood transfusions, dialysis, IV or tube feedings, and a ventilator (breathing machine)  · Values history: This document has questions about your views, beliefs, and how you feel and think about life  This information can help others choose the care that you would choose  Why are advance directives important? An advance directive helps you control your care  Although spoken wishes may be used, it is better to have your wishes written down  Spoken wishes can be misunderstood, or not followed  Treatments may be given even if you do not want them  An advance directive may make it easier for your family to make difficult choices about your care  Weight Management   Why it is important to manage your weight:  Being overweight increases your risk of health conditions such as heart disease, high blood pressure, type 2 diabetes, and certain types of cancer   It can also increase your risk for osteoarthritis, sleep apnea, and other respiratory problems  Aim for a slow, steady weight loss  Even a small amount of weight loss can lower your risk of health problems  How to lose weight safely:  A safe and healthy way to lose weight is to eat fewer calories and get regular exercise  You can lose up about 1 pound a week by decreasing the number of calories you eat by 500 calories each day  Healthy meal plan for weight management:  A healthy meal plan includes a variety of foods, contains fewer calories, and helps you stay healthy  A healthy meal plan includes the following:  · Eat whole-grain foods more often  A healthy meal plan should contain fiber  Fiber is the part of grains, fruits, and vegetables that is not broken down by your body  Whole-grain foods are healthy and provide extra fiber in your diet  Some examples of whole-grain foods are whole-wheat breads and pastas, oatmeal, brown rice, and bulgur  · Eat a variety of vegetables every day  Include dark, leafy greens such as spinach, kale, [place] greens, and mustard greens  Eat yellow and orange vegetables such as carrots, sweet potatoes, and winter squash  · Eat a variety of fruits every day  Choose fresh or canned fruit (canned in its own juice or light syrup) instead of juice  Fruit juice has very little or no fiber  · Eat low-fat dairy foods  Drink fat-free (skim) milk or 1% milk  Eat fat-free yogurt and low-fat cottage cheese  Try low-fat cheeses such as mozzarella and other reduced-fat cheeses  · Choose meat and other protein foods that are low in fat  Choose beans or other legumes such as split peas or lentils  Choose fish, skinless poultry (chicken or turkey), or lean cuts of red meat (beef or pork)  Before you cook meat or poultry, cut off any visible fat  · Use less fat and oil  Try baking foods instead of frying them  Add less fat, such as margarine, sour cream, regular salad dressing and mayonnaise to foods   Eat fewer high-fat foods  Some examples of high-fat foods include french fries, doughnuts, ice cream, and cakes  · Eat fewer sweets  Limit foods and drinks that are high in sugar  This includes candy, cookies, regular soda, and sweetened drinks  Exercise:  Exercise at least 30 minutes per day on most days of the week  Some examples of exercise include walking, biking, dancing, and swimming  You can also fit in more physical activity by taking the stairs instead of the elevator or parking farther away from stores  Ask your healthcare provider about the best exercise plan for you  © Copyright [employer] 2018 Information is for End User's use only and may not be sold, redistributed or otherwise used for commercial purposes  All illustrations and images included in CareNotes® are the copyrighted property of [employer] , [employer]  or [facility] Preventive Visit Patient Instructions  Thank you for completing your Welcome to Medicare Visit or Medicare Annual Wellness Visit today  Your next wellness visit will be due in one year (1/15/2022)  The screening/preventive services that you may require over the next 5-10 years are detailed below  Some tests may not apply to you based off risk factors and/or age  Screening tests ordered at today's visit but not completed yet may show as past due  Also, please note that scanned in results may not display below  Preventive Screenings:  Service Recommendations Previous Testing/Comments   Colorectal Cancer Screening  * Colonoscopy    * Fecal Occult Blood Test (FOBT)/Fecal Immunochemical Test (FIT)  * Fecal DNA/Cologuard Test  * Flexible Sigmoidoscopy Age: 54-65 years old   Colonoscopy: every 10 years (may be performed more frequently if at higher risk)  OR  FOBT/FIT: every 1 year  OR  Cologuard: every 3 years  OR  Sigmoidoscopy: every 5 years  Screening may be recommended earlier than age 48 if at higher risk for colorectal cancer   Also, an individualized decision between you and your healthcare provider will decide whether screening between the ages of 74-80 would be appropriate  Colonoscopy: Not on file  FOBT/FIT: Not on file  Cologuard: Not on file  Sigmoidoscopy: Not on file    Screening Not Indicated     Breast Cancer Screening Age: 36 years old  Frequency: every 1-2 years  Not required if history of left and right mastectomy Mammogram: Not on file       Cervical Cancer Screening Between the ages of 21-29, pap smear recommended once every 3 years  Between the ages of 33-67, can perform pap smear with HPV co-testing every 5 years  Recommendations may differ for women with a history of total hysterectomy, cervical cancer, or abnormal pap smears in past  Pap Smear: Not on file    Screening Not Indicated   Hepatitis C Screening Once for adults born between 1945 and 1965  More frequently in patients at high risk for Hepatitis C Hep C Antibody: Not on file       Diabetes Screening 1-2 times per year if you're at risk for diabetes or have pre-diabetes Fasting glucose: 92 mg/dL   A1C: 7 2 %    Screening Not Indicated  History Diabetes   Cholesterol Screening Once every 5 years if you don't have a lipid disorder  May order more often based on risk factors  Lipid panel: 09/08/2020    Screening Not Indicated  History Lipid Disorder     Other Preventive Screenings Covered by Medicare:  6  Abdominal Aortic Aneurysm (AAA) Screening: covered once if your at risk  You're considered to be at risk if you have a family history of AAA  7  Lung Cancer Screening: covers low dose CT scan once per year if you meet all of the following conditions: (1) Age 50-69; (2) No signs or symptoms of lung cancer; (3) Current smoker or have quit smoking within the last 15 years; (4) You have a tobacco smoking history of at least 30 pack years (packs per day multiplied by number of years you smoked); (5) You get a written order from a healthcare provider    8  Glaucoma Screening: covered annually if you're considered high risk: (1) You have diabetes OR (2) Family history of glaucoma OR (3)  aged 48 and older OR (4)  American aged 72 and older  5  Osteoporosis Screening: covered every 2 years if you meet one of the following conditions: (1) You're estrogen deficient and at risk for osteoporosis based off medical history and other findings; (2) Have a vertebral abnormality; (3) On glucocorticoid therapy for more than 3 months; (4) Have primary hyperparathyroidism; (5) On osteoporosis medications and need to assess response to drug therapy  · Last bone density test (DXA Scan): 02/25/2019   10  HIV Screening: covered annually if you're between the age of 15-65  Also covered annually if you are younger than 13 and older than 72 with risk factors for HIV infection  For pregnant patients, it is covered up to 3 times per pregnancy  Immunizations:  Immunization Recommendations   Influenza Vaccine Annual influenza vaccination during flu season is recommended for all persons aged >= 6 months who do not have contraindications   Pneumococcal Vaccine (Prevnar and Pneumovax)  * Prevnar = PCV13  * Pneumovax = PPSV23   Adults 25-60 years old: 1-3 doses may be recommended based on certain risk factors  Adults 72 years old: Prevnar (PCV13) vaccine recommended followed by Pneumovax (PPSV23) vaccine  If already received PPSV23 since turning 65, then PCV13 recommended at least one year after PPSV23 dose  Hepatitis B Vaccine 3 dose series if at intermediate or high risk (ex: diabetes, end stage renal disease, liver disease)   Tetanus (Td) Vaccine - COST NOT COVERED BY MEDICARE PART B Following completion of primary series, a booster dose should be given every 10 years to maintain immunity against tetanus  Td may also be given as tetanus wound prophylaxis  Tdap Vaccine - COST NOT COVERED BY MEDICARE PART B Recommended at least once for all adults  For pregnant patients, recommended with each pregnancy  Shingles Vaccine (Shingrix) - COST NOT COVERED BY MEDICARE PART B  2 shot series recommended in those aged 48 and above     Health Maintenance Due:  There are no preventive care reminders to display for this patient  Immunizations Due:      Topic Date Due    Pneumococcal Vaccine: 65+ Years (2 of 2 - PPSV23) 12/01/2015     Advance Directives   What are advance directives? Advance directives are legal documents that state your wishes and plans for medical care  These plans are made ahead of time in case you lose your ability to make decisions for yourself  Advance directives can apply to any medical decision, such as the treatments you want, and if you want to donate organs  What are the types of advance directives? There are many types of advance directives, and each state has rules about how to use them  You may choose a combination of any of the following:  · Living will: This is a written record of the treatment you want  You can also choose which treatments you do not want, which to limit, and which to stop at a certain time  This includes surgery, medicine, IV fluid, and tube feedings  · Durable power of  for healthcare [place] [facility]): This is a written record that states who you want to make healthcare choices for you when you are unable to make them for yourself  This person, called a proxy, is usually a family member or a friend  You may choose more than 1 proxy  · Do not resuscitate (DNR) order:  A DNR order is used in case your heart stops beating or you stop breathing  It is a request not to have certain forms of treatment, such as CPR  A DNR order may be included in other types of advance directives  · Medical directive: This covers the care that you want if you are in a coma, near death, or unable to make decisions for yourself  You can list the treatments you want for each condition   Treatment may include pain medicine, surgery, blood transfusions, dialysis, IV or tube feedings, and a ventilator (breathing machine)  · Values history: This document has questions about your views, beliefs, and how you feel and think about life  This information can help others choose the care that you would choose  Why are advance directives important? An advance directive helps you control your care  Although spoken wishes may be used, it is better to have your wishes written down  Spoken wishes can be misunderstood, or not followed  Treatments may be given even if you do not want them  An advance directive may make it easier for your family to make difficult choices about your care  Weight Management   Why it is important to manage your weight:  Being overweight increases your risk of health conditions such as heart disease, high blood pressure, type 2 diabetes, and certain types of cancer  It can also increase your risk for osteoarthritis, sleep apnea, and other respiratory problems  Aim for a slow, steady weight loss  Even a small amount of weight loss can lower your risk of health problems  How to lose weight safely:  A safe and healthy way to lose weight is to eat fewer calories and get regular exercise  You can lose up about 1 pound a week by decreasing the number of calories you eat by 500 calories each day  Healthy meal plan for weight management:  A healthy meal plan includes a variety of foods, contains fewer calories, and helps you stay healthy  A healthy meal plan includes the following:  · Eat whole-grain foods more often  A healthy meal plan should contain fiber  Fiber is the part of grains, fruits, and vegetables that is not broken down by your body  Whole-grain foods are healthy and provide extra fiber in your diet  Some examples of whole-grain foods are whole-wheat breads and pastas, oatmeal, brown rice, and bulgur  · Eat a variety of vegetables every day  Include dark, leafy greens such as spinach, kale, [place] greens, and mustard greens   Eat yellow and orange vegetables such as carrots, sweet potatoes, and winter squash  · Eat a variety of fruits every day  Choose fresh or canned fruit (canned in its own juice or light syrup) instead of juice  Fruit juice has very little or no fiber  · Eat low-fat dairy foods  Drink fat-free (skim) milk or 1% milk  Eat fat-free yogurt and low-fat cottage cheese  Try low-fat cheeses such as mozzarella and other reduced-fat cheeses  · Choose meat and other protein foods that are low in fat  Choose beans or other legumes such as split peas or lentils  Choose fish, skinless poultry (chicken or turkey), or lean cuts of red meat (beef or pork)  Before you cook meat or poultry, cut off any visible fat  · Use less fat and oil  Try baking foods instead of frying them  Add less fat, such as margarine, sour cream, regular salad dressing and mayonnaise to foods  Eat fewer high-fat foods  Some examples of high-fat foods include french fries, doughnuts, ice cream, and cakes  · Eat fewer sweets  Limit foods and drinks that are high in sugar  This includes candy, cookies, regular soda, and sweetened drinks  Exercise:  Exercise at least 30 minutes per day on most days of the week  Some examples of exercise include walking, biking, dancing, and swimming  You can also fit in more physical activity by taking the stairs instead of the elevator or parking farther away from stores  Ask your healthcare provider about the best exercise plan for you  © Copyright [employer] 2018 Information is for End User's use only and may not be sold, redistributed or otherwise used for commercial purposes  All illustrations and images included in CareNotes® are the copyrighted property of [employer] [place]  or [employer] [facility] Preventive Visit Patient Instructions  Thank you for completing your Welcome to Medicare Visit or Medicare Annual Wellness Visit today  Your next wellness visit will be due in one year (1/15/2022)    The screening/preventive services that you may require over the next 5-10 years are detailed below  Some tests may not apply to you based off risk factors and/or age  Screening tests ordered at today's visit but not completed yet may show as past due  Also, please note that scanned in results may not display below  Preventive Screenings:  Service Recommendations Previous Testing/Comments   Colorectal Cancer Screening  * Colonoscopy    * Fecal Occult Blood Test (FOBT)/Fecal Immunochemical Test (FIT)  * Fecal DNA/Cologuard Test  * Flexible Sigmoidoscopy Age: 54-65 years old   Colonoscopy: every 10 years (may be performed more frequently if at higher risk)  OR  FOBT/FIT: every 1 year  OR  Cologuard: every 3 years  OR  Sigmoidoscopy: every 5 years  Screening may be recommended earlier than age 48 if at higher risk for colorectal cancer  Also, an individualized decision between you and your healthcare provider will decide whether screening between the ages of 74-80 would be appropriate  Colonoscopy: Not on file  FOBT/FIT: Not on file  Cologuard: Not on file  Sigmoidoscopy: Not on file    Screening Not Indicated     Breast Cancer Screening Age: 36 years old  Frequency: every 1-2 years  Not required if history of left and right mastectomy Mammogram: Not on file       Cervical Cancer Screening Between the ages of 21-29, pap smear recommended once every 3 years  Between the ages of 33-67, can perform pap smear with HPV co-testing every 5 years     Recommendations may differ for women with a history of total hysterectomy, cervical cancer, or abnormal pap smears in past  Pap Smear: Not on file    Screening Not Indicated   Hepatitis C Screening Once for adults born between 1945 and 1965  More frequently in patients at high risk for Hepatitis C Hep C Antibody: Not on file       Diabetes Screening 1-2 times per year if you're at risk for diabetes or have pre-diabetes Fasting glucose: 92 mg/dL   A1C: 7 2 %    Screening Not Indicated  History Diabetes   Cholesterol Screening Once every 5 years if you don't have a lipid disorder  May order more often based on risk factors  Lipid panel: 09/08/2020    Screening Not Indicated  History Lipid Disorder     Other Preventive Screenings Covered by Medicare:  11  Abdominal Aortic Aneurysm (AAA) Screening: covered once if your at risk  You're considered to be at risk if you have a family history of AAA  12  Lung Cancer Screening: covers low dose CT scan once per year if you meet all of the following conditions: (1) Age 50-69; (2) No signs or symptoms of lung cancer; (3) Current smoker or have quit smoking within the last 15 years; (4) You have a tobacco smoking history of at least 30 pack years (packs per day multiplied by number of years you smoked); (5) You get a written order from a healthcare provider  15  Glaucoma Screening: covered annually if you're considered high risk: (1) You have diabetes OR (2) Family history of glaucoma OR (3)  aged 48 and older OR (3)  American aged 72 and older  15  Osteoporosis Screening: covered every 2 years if you meet one of the following conditions: (1) You're estrogen deficient and at risk for osteoporosis based off medical history and other findings; (2) Have a vertebral abnormality; (3) On glucocorticoid therapy for more than 3 months; (4) Have primary hyperparathyroidism; (5) On osteoporosis medications and need to assess response to drug therapy  · Last bone density test (DXA Scan): 02/25/2019  15  HIV Screening: covered annually if you're between the age of 12-76  Also covered annually if you are younger than 13 and older than 72 with risk factors for HIV infection  For pregnant patients, it is covered up to 3 times per pregnancy      Immunizations:  Immunization Recommendations   Influenza Vaccine Annual influenza vaccination during flu season is recommended for all persons aged >= 6 months who do not have contraindications   Pneumococcal Vaccine (Prevnar and Pneumovax)  * Prevnar = PCV13  * Pneumovax = PPSV23   Adults 25-60 years old: 1-3 doses may be recommended based on certain risk factors  Adults 72 years old: Prevnar (PCV13) vaccine recommended followed by Pneumovax (PPSV23) vaccine  If already received PPSV23 since turning 65, then PCV13 recommended at least one year after PPSV23 dose  Hepatitis B Vaccine 3 dose series if at intermediate or high risk (ex: diabetes, end stage renal disease, liver disease)   Tetanus (Td) Vaccine - COST NOT COVERED BY MEDICARE PART B Following completion of primary series, a booster dose should be given every 10 years to maintain immunity against tetanus  Td may also be given as tetanus wound prophylaxis  Tdap Vaccine - COST NOT COVERED BY MEDICARE PART B Recommended at least once for all adults  For pregnant patients, recommended with each pregnancy  Shingles Vaccine (Shingrix) - COST NOT COVERED BY MEDICARE PART B  2 shot series recommended in those aged 48 and above     Health Maintenance Due:  There are no preventive care reminders to display for this patient  Immunizations Due:      Topic Date Due    Pneumococcal Vaccine: 65+ Years (2 of 2 - PPSV23) 12/01/2015     Advance Directives   What are advance directives? Advance directives are legal documents that state your wishes and plans for medical care  These plans are made ahead of time in case you lose your ability to make decisions for yourself  Advance directives can apply to any medical decision, such as the treatments you want, and if you want to donate organs  What are the types of advance directives? There are many types of advance directives, and each state has rules about how to use them  You may choose a combination of any of the following:  · Living will: This is a written record of the treatment you want  You can also choose which treatments you do not want, which to limit, and which to stop at a certain time   This includes surgery, medicine, IV fluid, and tube feedings  · Durable power of  for healthcare [facility]): This is a written record that states who you want to make healthcare choices for you when you are unable to make them for yourself  This person, called a proxy, is usually a family member or a friend  You may choose more than 1 proxy  · Do not resuscitate (DNR) order:  A DNR order is used in case your heart stops beating or you stop breathing  It is a request not to have certain forms of treatment, such as CPR  A DNR order may be included in other types of advance directives  · Medical directive: This covers the care that you want if you are in a coma, near death, or unable to make decisions for yourself  You can list the treatments you want for each condition  Treatment may include pain medicine, surgery, blood transfusions, dialysis, IV or tube feedings, and a ventilator (breathing machine)  · Values history: This document has questions about your views, beliefs, and how you feel and think about life  This information can help others choose the care that you would choose  Why are advance directives important? An advance directive helps you control your care  Although spoken wishes may be used, it is better to have your wishes written down  Spoken wishes can be misunderstood, or not followed  Treatments may be given even if you do not want them  An advance directive may make it easier for your family to make difficult choices about your care  Weight Management   Why it is important to manage your weight:  Being overweight increases your risk of health conditions such as heart disease, high blood pressure, type 2 diabetes, and certain types of cancer  It can also increase your risk for osteoarthritis, sleep apnea, and other respiratory problems  Aim for a slow, steady weight loss  Even a small amount of weight loss can lower your risk of health problems    How to lose weight safely:  A safe and healthy way to lose weight is to eat fewer calories and get regular exercise  You can lose up about 1 pound a week by decreasing the number of calories you eat by 500 calories each day  Healthy meal plan for weight management:  A healthy meal plan includes a variety of foods, contains fewer calories, and helps you stay healthy  A healthy meal plan includes the following:  · Eat whole-grain foods more often  A healthy meal plan should contain fiber  Fiber is the part of grains, fruits, and vegetables that is not broken down by your body  Whole-grain foods are healthy and provide extra fiber in your diet  Some examples of whole-grain foods are whole-wheat breads and pastas, oatmeal, brown rice, and bulgur  · Eat a variety of vegetables every day  Include dark, leafy greens such as spinach, kale, [place] greens, and mustard greens  Eat yellow and orange vegetables such as carrots, sweet potatoes, and winter squash  · Eat a variety of fruits every day  Choose fresh or canned fruit (canned in its own juice or light syrup) instead of juice  Fruit juice has very little or no fiber  · Eat low-fat dairy foods  Drink fat-free (skim) milk or 1% milk  Eat fat-free yogurt and low-fat cottage cheese  Try low-fat cheeses such as mozzarella and other reduced-fat cheeses  · Choose meat and other protein foods that are low in fat  Choose beans or other legumes such as split peas or lentils  Choose fish, skinless poultry (chicken or turkey), or lean cuts of red meat (beef or pork)  Before you cook meat or poultry, cut off any visible fat  · Use less fat and oil  Try baking foods instead of frying them  Add less fat, such as margarine, sour cream, regular salad dressing and mayonnaise to foods  Eat fewer high-fat foods  Some examples of high-fat foods include french fries, doughnuts, ice cream, and cakes  · Eat fewer sweets  Limit foods and drinks that are high in sugar  This includes candy, cookies, regular soda, and sweetened drinks    Exercise: Exercise at least 30 minutes per day on most days of the week  Some examples of exercise include walking, biking, dancing, and swimming  You can also fit in more physical activity by taking the stairs instead of the elevator or parking farther away from stores  Ask your healthcare provider about the best exercise plan for you  © Copyright [employer] 2018 Information is for End User's use only and may not be sold, redistributed or otherwise used for commercial purposes   All illustrations and images included in CareNotes® are the copyrighted property of [employer]  or [address] [place] [address]

## 2021-01-15 NOTE — ASSESSMENT & PLAN NOTE
-DM2 with CKD and microalbuminuria  A1c overall stable at 7 2    Lab Results   Component Value Date    HGBA1C 7 2 (H) 01/11/2021   -blood sugars reviewed, no hypoglycemia noted  -continue on basaglar 34units daily  -continue ACEI  -foot exam performed

## 2021-01-20 DIAGNOSIS — Z23 ENCOUNTER FOR IMMUNIZATION: ICD-10-CM

## 2021-01-24 ENCOUNTER — IMMUNIZATIONS (OUTPATIENT)
Dept: FAMILY MEDICINE CLINIC | Facility: HOSPITAL | Age: 86
End: 2021-01-24

## 2021-01-24 DIAGNOSIS — Z23 ENCOUNTER FOR IMMUNIZATION: Primary | ICD-10-CM

## 2021-01-24 PROCEDURE — 0011A SARS-COV-2 / COVID-19 MRNA VACCINE (MODERNA) 100 MCG: CPT

## 2021-01-24 PROCEDURE — 91301 SARS-COV-2 / COVID-19 MRNA VACCINE (MODERNA) 100 MCG: CPT

## 2021-03-23 ENCOUNTER — PREPPED CHART (OUTPATIENT)
Dept: URBAN - METROPOLITAN AREA CLINIC 6 | Facility: CLINIC | Age: 86
End: 2021-03-23

## 2021-04-14 PROBLEM — S86.912A MUSCLE STRAIN OF LEFT LOWER LEG: Status: ACTIVE | Noted: 2021-01-01

## 2021-04-14 NOTE — ASSESSMENT & PLAN NOTE
-resolving pulled hamstring  -recommend stretches, alternate heat/ice, can apply voltaren gel  -return to activities as tolerated

## 2021-04-14 NOTE — PROGRESS NOTES
Assessment/Plan:    Problem List Items Addressed This Visit        Musculoskeletal and Integument    Muscle strain of left lower leg - Primary     -resolving pulled hamstring  -recommend stretches, alternate heat/ice, can apply voltaren gel  -return to activities as tolerated               Subjective:      Patient ID: Paula Lopez is a 80 y o  female  HPI  81yo female with DM2 with CKD and microalbuminuria, HTN, anemia, HLD, chronic GERD, HLD, bladder CA, RLL lung CA s/p lobectomy and h/o lymphoma here for evaluation of L leg pain  She woke up last Wednesday morning and pulled muscle in her leg  It is in back of her thigh, it is getting better  Occurred one month ago as well but only lasted two days  She has limited applying pressure on it and has been using her walker more  She has applied heat on it  She denies LE swelling, red, rash  Has LE paresthesia  The following portions of the patient's history were reviewed and updated as appropriate: allergies, current medications, past family history, past medical history, past social history, past surgical history and problem list       Current Outpatient Medications:     acetaminophen (TYLENOL) 325 mg tablet, Take 2 tablets (650 mg total) by mouth every 6 (six) hours as needed for mild pain, headaches or fever  , Disp: 30 tablet, Rfl: 0    amLODIPine (NORVASC) 5 mg tablet, Take 1 tablet (5 mg total) by mouth daily, Disp: 90 tablet, Rfl: 1    Azopt 1 % ophthalmic suspension, 3 (three) times a day , Disp: , Rfl:     brimonidine (Alphagan P) 0 1 %, Alphagan P 0 1 % eye drops  ADMINISTER 1 DROP OPHTHALMICALLY IN THE RIGHT EYE THREE TIMES A DAY   90 DAY SUPPLY, Disp: , Rfl:     calcium carbonate (OS-DEMETRIA) 1250 (500 Ca) MG tablet, Take 1 tablet by mouth daily, Disp: , Rfl:     cholecalciferol (VITAMIN D3) 1,000 units tablet, Take 2,000 Units by mouth daily, Disp: , Rfl:     Cyanocobalamin (VITAMIN B-12 PO), Take 1 tablet by mouth , Disp: , Rfl:    hydrochlorothiazide (HYDRODIURIL) 12 5 mg tablet, Take 1 tablet (12 5 mg total) by mouth daily, Disp: 90 tablet, Rfl: 3    insulin glargine (Basaglar KwikPen) 100 units/mL injection pen, Inject 34 Units under the skin daily, Disp: 32 mL, Rfl: 1    Insulin Pen Needle (BD ULTRA-FINE PEN NEEDLES) 29G X 12 7MM MISC, by Does not apply route 2 (two) times a day, Disp: 180 each, Rfl: 1    lisinopril (ZESTRIL) 10 mg tablet, Take 1 tablet (10 mg total) by mouth daily, Disp: 90 tablet, Rfl: 1    Multiple Vitamin (MULTIVITAMIN) tablet, Take 1 tablet by mouth daily  , Disp: , Rfl:     omeprazole (PriLOSEC) 20 mg delayed release capsule, Take 20 mg by mouth daily  , Disp: , Rfl:     pravastatin (PRAVACHOL) 20 mg tablet, Take 1 tablet (20 mg total) by mouth daily, Disp: 90 tablet, Rfl: 1    calcium citrate-vitamin D (CITRACAL+D) 315-200 MG-UNIT per tablet, Take 1 tablet by mouth daily  , Disp: , Rfl:     Review of Systems   Musculoskeletal: Positive for arthralgias, gait problem and myalgias  Neurological: Positive for numbness  Objective:    /70 (BP Location: Left arm, Patient Position: Sitting)   Pulse 94   Temp 98 9 °F (37 2 °C) (Tympanic)   Resp 16   Ht 5' 1" (1 549 m)   Wt 74 8 kg (165 lb)   SpO2 98%   BMI 31 18 kg/m²      Physical Exam  Vitals signs reviewed  Musculoskeletal:      Left upper leg: She exhibits no tenderness, no bony tenderness, no swelling, no edema and no deformity  Right lower leg: No edema  Left lower leg: She exhibits no deformity  Edema (trace) present  Comments: BL knee flexion and extension normal  Ambulates with a cane   Skin:     Findings: No rash  Neurological:      Mental Status: She is alert and oriented to person, place, and time

## 2021-06-04 NOTE — ASSESSMENT & PLAN NOTE
Ms Sylvia Bal is 9 years out from resection with no evidence of a new lung cancer  We had a discussion regarding further follow up, since she was wondering if she needs to continue surveillance  According to the patient, at her age, she would not have any findings further investigated or treated  Therefore, no further surveillance is required  We discussed this at length  We will not schedule a follow up appointment at this time  She is in complete agreement with the plan

## 2021-07-26 PROBLEM — S86.912A MUSCLE STRAIN OF LEFT LOWER LEG: Status: RESOLVED | Noted: 2021-01-01 | Resolved: 2021-01-01

## 2021-07-26 PROBLEM — E83.52 HYPERCALCEMIA: Status: ACTIVE | Noted: 2021-01-01

## 2021-07-26 PROBLEM — Z85.118 HISTORY OF ADENOCARCINOMA OF LUNG: Status: ACTIVE | Noted: 2017-03-13

## 2021-07-26 NOTE — ASSESSMENT & PLAN NOTE
- to decrease pill burden at her age, advised to complete supply of pravastatin then d/c  -adhere to low fat, low cholesterol diet

## 2021-07-26 NOTE — PROGRESS NOTES
Assessment/Plan:    Problem List Items Addressed This Visit        Endocrine    Diabetes mellitus with microalbuminuria ([facility] [address] ) - Primary     -DM2 with microalbuminuria and CKD  A1c is improved at 6 5  Lab Results   Component Value Date    HGBA1C 6 5 (H) 07/23/2021   -to reduce risk of hypoglycemia, pt to decrease Basaglar to 30units daily  -on ACEI         Relevant Orders    Hemoglobin A1C    Microalbumin / creatinine urine ratio    Comprehensive metabolic panel       Genitourinary    CKD (chronic kidney disease), stage III ([facility] [address] )    Relevant Orders    Basic metabolic panel    Microalbumin / creatinine urine ratio       Other    History of adenocarcinoma of lung     -s/p lobectomy in 2012  -pt has been released from thoracic surgery care recently         Hyperlipidemia     - to decrease pill burden at her age, advised to complete supply of pravastatin then d/c  -adhere to low fat, low cholesterol diet         Relevant Orders    TSH, 3rd generation with Free T4 reflex    TSH, 3rd generation with Free T4 reflex    Hypercalcemia     -d/c all calcium supplements  -recheck BMP and pth in one month  -if still elevated will have her hold hctz         Relevant Orders    Basic metabolic panel    PTH, intact    Comprehensive metabolic panel      Other Visit Diagnoses     Decreased thyroid stimulating hormone (TSH) level        -suppressed tsh with normal FT4  -plan to repeat TFTs    Relevant Orders    TSH, 3rd generation with Free T4 reflex    TSH, 3rd generation with Free T4 reflex          Subjective:      Patient ID: Ángel Koch is a 80 y o  female  81yo female with DM2 with CKD, microalbuminuria, HTN, HLD, chronic GERD, h/o bladder CA, RLL lung CA s/p lobectomy in 2012 and h/o lymphoma here for follow up care  She was discharged from thoracic for history of lung cancer  Last CT chest 5/2021 that did not show new pulmonary nodules  She denies SOB, cough or wheeze      Diabetes  She presents for her follow-up diabetic visit  She has type 2 diabetes mellitus  Her disease course has been improving  Pertinent negatives for hypoglycemia include no dizziness or headaches  (Feels "woozy"  Had one episode of low blood sugar of 88) Pertinent negatives for diabetes include no chest pain and no fatigue  Risk factors for coronary artery disease include obesity, hypertension, diabetes mellitus, dyslipidemia and post-menopausal  Current diabetic treatment includes insulin injections  (FBS typically <140)     She has adjusted her calcium intake and supplements, takes her supplement every other day due to hypercalcemia  The following portions of the patient's history were reviewed and updated as appropriate: allergies, current medications, past family history, past medical history, past social history, past surgical history and problem list       Current Outpatient Medications:     acetaminophen (TYLENOL) 325 mg tablet, Take 2 tablets (650 mg total) by mouth every 6 (six) hours as needed for mild pain, headaches or fever  , Disp: 30 tablet, Rfl: 0    amLODIPine (NORVASC) 5 mg tablet, Take 1 tablet (5 mg total) by mouth daily, Disp: 90 tablet, Rfl: 1    Azopt 1 % ophthalmic suspension, 3 (three) times a day , Disp: , Rfl:     brimonidine (Alphagan P) 0 1 %, Alphagan P 0 1 % eye drops  ADMINISTER 1 DROP OPHTHALMICALLY IN THE RIGHT EYE THREE TIMES A DAY   90 DAY SUPPLY, Disp: , Rfl:     calcium citrate-vitamin D (CITRACAL+D) 315-200 MG-UNIT per tablet, Take 1 tablet by mouth daily  , Disp: , Rfl:     cholecalciferol (VITAMIN D3) 1,000 units tablet, Take 2,000 Units by mouth daily, Disp: , Rfl:     Cyanocobalamin (VITAMIN B-12 PO), Take 1 tablet by mouth , Disp: , Rfl:     hydrochlorothiazide (HYDRODIURIL) 12 5 mg tablet, Take 1 tablet (12 5 mg total) by mouth daily, Disp: 90 tablet, Rfl: 3    insulin glargine (Basaglar KwikPen) 100 units/mL injection pen, Inject 34 Units under the skin daily, Disp: 32 mL, Rfl: 1    Insulin Pen Needle (BD ULTRA-FINE PEN NEEDLES) 29G X 12 7MM MISC, by Does not apply route 2 (two) times a day, Disp: 180 each, Rfl: 1    lisinopril (ZESTRIL) 10 mg tablet, Take 1 tablet (10 mg total) by mouth daily, Disp: 90 tablet, Rfl: 1    Multiple Vitamin (MULTIVITAMIN) tablet, Take 1 tablet by mouth daily  , Disp: , Rfl:     omeprazole (PriLOSEC) 20 mg delayed release capsule, Take 20 mg by mouth daily  , Disp: , Rfl:     pravastatin (PRAVACHOL) 20 mg tablet, Take 1 tablet (20 mg total) by mouth daily, Disp: 90 tablet, Rfl: 1    Review of Systems   Constitutional: Positive for unexpected weight change (mild intentional weight loss)  Negative for appetite change and fatigue  Respiratory: Negative for cough, chest tightness, shortness of breath and wheezing  Cardiovascular: Negative for chest pain, palpitations and leg swelling  Gastrointestinal: Negative for abdominal pain, constipation, diarrhea, nausea and vomiting  Genitourinary: Negative for difficulty urinating and hematuria  Musculoskeletal: Positive for back pain (with prolonged standing) and gait problem  Neurological: Negative for dizziness and headaches  Objective:    /70 (BP Location: Right arm, Patient Position: Sitting)   Pulse 90   Temp 98 5 °F (36 9 °C)   Resp 16   Ht 5' 1" (1 549 m)   Wt 73 kg (161 lb)   SpO2 96%   BMI 30 42 kg/m²      Physical Exam  Vitals reviewed  Constitutional:       General: She is not in acute distress  Appearance: She is not diaphoretic  Cardiovascular:      Rate and Rhythm: Normal rate and regular rhythm  Pulses: Normal pulses  Heart sounds: Normal heart sounds  Pulmonary:      Effort: Pulmonary effort is normal  No respiratory distress  Breath sounds: Normal breath sounds  Musculoskeletal:      Right lower leg: No edema  Left lower leg: No edema        Comments: Ambulates with a cane  BLE varicose veins   Neurological:      Mental Status: She is alert and oriented to person, place, and time  Psychiatric:         Attention and Perception: Attention normal          Mood and Affect: Mood normal          Speech: Speech normal          Behavior: Behavior is cooperative  Recent Results (from the past 504 hour(s))   Hemoglobin A1C    Collection Time: 07/23/21  8:07 AM   Result Value Ref Range    Hemoglobin A1C 6 5 (H) Normal 3 8-5 6%; PreDiabetic 5 7-6 4%; Diabetic >=6 5%; Glycemic control for adults with diabetes <7 0% %     mg/dl   Comprehensive metabolic panel    Collection Time: 07/23/21  8:07 AM   Result Value Ref Range    Sodium 139 136 - 145 mmol/L    Potassium 4 8 3 5 - 5 3 mmol/L    Chloride 111 (H) 100 - 108 mmol/L    CO2 20 (L) 21 - 32 mmol/L    ANION GAP 8 4 - 13 mmol/L    BUN 47 (H) 5 - 25 mg/dL    Creatinine 1 77 (H) 0 60 - 1 30 mg/dL    Glucose, Fasting 101 (H) 65 - 99 mg/dL    Calcium 9 8 8 3 - 10 1 mg/dL    Corrected Calcium 10 4 (H) 8 3 - 10 1 mg/dL    AST 11 5 - 45 U/L    ALT 14 12 - 78 U/L    Alkaline Phosphatase 64 46 - 116 U/L    Total Protein 8 0 6 4 - 8 2 g/dL    Albumin 3 2 (L) 3 5 - 5 0 g/dL    Total Bilirubin 0 28 0 20 - 1 00 mg/dL    eGFR 25 ml/min/1 73sq m   Lipid panel    Collection Time: 07/23/21  8:07 AM   Result Value Ref Range    Cholesterol 132 50 - 200 mg/dL    Triglycerides 124 <=150 mg/dL    HDL, Direct 40 >=40 mg/dL    LDL Calculated 67 0 - 100 mg/dL    Non-HDL-Chol (CHOL-HDL) 92 mg/dl   TSH, 3rd generation with Free T4 reflex    Collection Time: 07/23/21  8:07 AM   Result Value Ref Range    TSH 3RD GENERATON 0 259 (L) 0 358 - 3 740 uIU/mL   Microalbumin / creatinine urine ratio    Collection Time: 07/23/21  8:07 AM   Result Value Ref Range    Creatinine, Ur 47 7 mg/dL    Microalbum  ,U,Random 18 5 0 0 - 20 0 mg/L    Microalb Creat Ratio 39 (H) 0 - 30 mg/g creatinine   T4, free    Collection Time: 07/23/21  8:07 AM   Result Value Ref Range    Free T4 1 08 0 76 - 1 46 ng/dL     BMI Counseling: Body mass index is 30 42 kg/m²   The BMI is above normal  Nutrition recommendations include decreasing portion sizes, consuming healthier snacks, limiting drinks that contain sugar, moderation in carbohydrate intake and reducing intake of cholesterol  Exercise recommendations include exercising 3-5 times per week and strength training exercises  No pharmacotherapy was ordered

## 2021-07-26 NOTE — ASSESSMENT & PLAN NOTE
-d/c all calcium supplements  -recheck BMP and pth in one month  -if still elevated will have her hold hctz

## 2021-07-26 NOTE — ASSESSMENT & PLAN NOTE
-DM2 with microalbuminuria and CKD    A1c is improved at 6 5  Lab Results   Component Value Date    HGBA1C 6 5 (H) 07/23/2021   -to reduce risk of hypoglycemia, pt to decrease Basaglar to 30units daily  -on ACEI

## 2021-09-27 ENCOUNTER — FOLLOW UP (OUTPATIENT)
Dept: URBAN - METROPOLITAN AREA CLINIC 6 | Facility: CLINIC | Age: 86
End: 2021-09-27

## 2021-09-27 DIAGNOSIS — H40.1111: ICD-10-CM

## 2021-09-27 DIAGNOSIS — H40.1123: ICD-10-CM

## 2021-09-27 PROCEDURE — G8428 CUR MEDS NOT DOCUMENT: HCPCS

## 2021-09-27 PROCEDURE — 92012 INTRM OPH EXAM EST PATIENT: CPT

## 2021-09-27 PROCEDURE — 1036F TOBACCO NON-USER: CPT

## 2021-09-27 ASSESSMENT — VISUAL ACUITY: OD_SC: 20/30-2

## 2021-09-27 ASSESSMENT — TONOMETRY
OS_IOP_MMHG: 18
OD_IOP_MMHG: 26

## 2021-10-27 PROBLEM — N18.4 CKD (CHRONIC KIDNEY DISEASE), STAGE IV (HCC): Status: ACTIVE | Noted: 2019-03-11

## 2021-11-05 PROBLEM — E21.3 HYPERPARATHYROIDISM (HCC): Status: ACTIVE | Noted: 2021-01-01

## 2021-12-10 PROBLEM — E11.51 PERIPHERAL CIRCULATORY DISORDER DUE TO TYPE 2 DIABETES MELLITUS (HCC): Status: ACTIVE | Noted: 2021-01-01

## 2022-01-01 ENCOUNTER — TELEPHONE (OUTPATIENT)
Dept: LAB | Facility: HOSPITAL | Age: 87
End: 2022-01-01

## 2022-01-01 ENCOUNTER — APPOINTMENT (OUTPATIENT)
Dept: RADIOLOGY | Age: 87
DRG: 683 | End: 2022-01-01
Payer: MEDICARE

## 2022-01-01 ENCOUNTER — TELEPHONE (OUTPATIENT)
Dept: CARDIOLOGY CLINIC | Facility: CLINIC | Age: 87
End: 2022-01-01

## 2022-01-01 ENCOUNTER — HOSPITAL ENCOUNTER (OUTPATIENT)
Dept: RADIOLOGY | Age: 87
Discharge: HOME | DRG: 683 | End: 2022-01-24
Payer: MEDICARE

## 2022-01-01 ENCOUNTER — OFFICE VISIT (OUTPATIENT)
Dept: INTERNAL MEDICINE CLINIC | Facility: CLINIC | Age: 87
End: 2022-01-01
Payer: MEDICARE

## 2022-01-01 ENCOUNTER — TELEPHONE (OUTPATIENT)
Dept: INTERNAL MEDICINE CLINIC | Facility: CLINIC | Age: 87
End: 2022-01-01

## 2022-01-01 ENCOUNTER — HOSPITAL ENCOUNTER (INPATIENT)
Facility: HOSPITAL | Age: 87
LOS: 4 days | DRG: 683 | End: 2022-01-30
Attending: EMERGENCY MEDICINE | Admitting: HOSPITALIST
Payer: MEDICARE

## 2022-01-01 ENCOUNTER — APPOINTMENT (OUTPATIENT)
Dept: LAB | Facility: HOSPITAL | Age: 87
DRG: 683 | End: 2022-01-01
Payer: MEDICARE

## 2022-01-01 ENCOUNTER — TELEPHONE (OUTPATIENT)
Dept: OTHER | Facility: OTHER | Age: 87
End: 2022-01-01

## 2022-01-01 ENCOUNTER — APPOINTMENT (EMERGENCY)
Dept: RADIOLOGY | Facility: HOSPITAL | Age: 87
DRG: 683 | End: 2022-01-01
Payer: MEDICARE

## 2022-01-01 ENCOUNTER — HOSPITAL ENCOUNTER (OUTPATIENT)
Dept: NON INVASIVE DIAGNOSTICS | Facility: CLINIC | Age: 87
Discharge: HOME | End: 2022-01-07
Payer: MEDICARE

## 2022-01-01 VITALS
SYSTOLIC BLOOD PRESSURE: 158 MMHG | DIASTOLIC BLOOD PRESSURE: 74 MMHG | WEIGHT: 155 LBS | HEIGHT: 61 IN | HEART RATE: 70 BPM | BODY MASS INDEX: 29.27 KG/M2

## 2022-01-01 VITALS
HEIGHT: 61 IN | OXYGEN SATURATION: 95 % | WEIGHT: 160 LBS | TEMPERATURE: 97.1 F | DIASTOLIC BLOOD PRESSURE: 44 MMHG | SYSTOLIC BLOOD PRESSURE: 106 MMHG | HEART RATE: 103 BPM | BODY MASS INDEX: 30.21 KG/M2

## 2022-01-01 VITALS
DIASTOLIC BLOOD PRESSURE: 48 MMHG | OXYGEN SATURATION: 88 % | SYSTOLIC BLOOD PRESSURE: 108 MMHG | TEMPERATURE: 97.6 F | HEART RATE: 129 BPM | RESPIRATION RATE: 20 BRPM

## 2022-01-01 DIAGNOSIS — N17.9 AKI (ACUTE KIDNEY INJURY) (HCC): ICD-10-CM

## 2022-01-01 DIAGNOSIS — N18.32 STAGE 3B CHRONIC KIDNEY DISEASE (HCC): ICD-10-CM

## 2022-01-01 DIAGNOSIS — C83.30 DIFFUSE LARGE B-CELL LYMPHOMA, UNSPECIFIED BODY REGION (HCC): Chronic | ICD-10-CM

## 2022-01-01 DIAGNOSIS — E83.52 HYPERCALCEMIA: ICD-10-CM

## 2022-01-01 DIAGNOSIS — E16.2 HYPOGLYCEMIA: ICD-10-CM

## 2022-01-01 DIAGNOSIS — E11.29 DIABETES MELLITUS WITH MICROALBUMINURIA (HCC): ICD-10-CM

## 2022-01-01 DIAGNOSIS — I10 ESSENTIAL HYPERTENSION: Chronic | ICD-10-CM

## 2022-01-01 DIAGNOSIS — D63.1 ANEMIA DUE TO CHRONIC KIDNEY DISEASE, UNSPECIFIED CKD STAGE: ICD-10-CM

## 2022-01-01 DIAGNOSIS — R79.89 ELEVATED LFTS: ICD-10-CM

## 2022-01-01 DIAGNOSIS — E78.2 MIXED HYPERLIPIDEMIA: ICD-10-CM

## 2022-01-01 DIAGNOSIS — R29.898 LEFT LEG WEAKNESS: ICD-10-CM

## 2022-01-01 DIAGNOSIS — D69.6 THROMBOCYTOPENIA (HCC): ICD-10-CM
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
## 2022-01-01 DIAGNOSIS — R16.1 SPLENOMEGALY: ICD-10-CM

## 2022-01-01 DIAGNOSIS — I10 BENIGN ESSENTIAL HTN: ICD-10-CM

## 2022-01-01 DIAGNOSIS — R53.1 WEAKNESS: ICD-10-CM

## 2022-01-01 DIAGNOSIS — R94.31 ABNORMAL EKG: ICD-10-CM

## 2022-01-01 DIAGNOSIS — N18.9 ANEMIA DUE TO CHRONIC KIDNEY DISEASE, UNSPECIFIED CKD STAGE: ICD-10-CM
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
## 2022-01-01 DIAGNOSIS — D69.6 THROMBOCYTOPENIA (HCC): Primary | ICD-10-CM

## 2022-01-01 DIAGNOSIS — E21.3 HYPERPARATHYROIDISM (HCC): ICD-10-CM

## 2022-01-01 DIAGNOSIS — R29.898 LEFT LEG WEAKNESS: Primary | ICD-10-CM

## 2022-01-01 DIAGNOSIS — R79.89 DECREASED THYROID STIMULATING HORMONE (TSH) LEVEL: ICD-10-CM

## 2022-01-01 DIAGNOSIS — R80.9 DIABETES MELLITUS WITH MICROALBUMINURIA (HCC): ICD-10-CM

## 2022-01-01 LAB
2HR DELTA HS TROPONIN: -10 NG/L
4HR DELTA HS TROPONIN: 0 NG/L
ABO GROUP BLD BPU: NORMAL
ABO GROUP BLD BPU: NORMAL
ABO GROUP BLD: NORMAL
ALBUMIN SERPL BCP-MCNC: 2 G/DL (ref 3.5–5)
ALBUMIN SERPL BCP-MCNC: 2.5 G/DL (ref 3.5–5)
ALBUMIN SERPL BCP-MCNC: 2.6 G/DL (ref 3.5–5)
ALBUMIN SERPL BCP-MCNC: 2.8 G/DL (ref 3.5–5)
ALP SERPL-CCNC: 464 U/L (ref 46–116)
ALP SERPL-CCNC: 467 U/L (ref 46–116)
ALP SERPL-CCNC: 518 U/L (ref 46–116)
ALP SERPL-CCNC: 543 U/L (ref 46–116)
ALT SERPL W P-5'-P-CCNC: 164 U/L (ref 12–78)
ALT SERPL W P-5'-P-CCNC: 175 U/L (ref 12–78)
ALT SERPL W P-5'-P-CCNC: 182 U/L (ref 12–78)
ALT SERPL W P-5'-P-CCNC: 187 U/L (ref 12–78)
AMORPH URATE CRY URNS QL MICRO: ABNORMAL /HPF
ANION GAP SERPL CALCULATED.3IONS-SCNC: 10 MMOL/L (ref 4–13)
ANION GAP SERPL CALCULATED.3IONS-SCNC: 12 MMOL/L (ref 4–13)
ANION GAP SERPL CALCULATED.3IONS-SCNC: 15 MMOL/L (ref 4–13)
ANION GAP SERPL CALCULATED.3IONS-SCNC: 8 MMOL/L (ref 4–13)
AORTIC ROOT: 2.8 CM
APICAL FOUR CHAMBER EJECTION FRACTION: 56 %
AST SERPL W P-5'-P-CCNC: 246 U/L (ref 5–45)
AST SERPL W P-5'-P-CCNC: 280 U/L (ref 5–45)
AST SERPL W P-5'-P-CCNC: 287 U/L (ref 5–45)
AST SERPL W P-5'-P-CCNC: 297 U/L (ref 5–45)
ATRIAL RATE: 111 BPM
AV REGURGITATION PRESSURE HALF TIME: 0.36 MS
BACTERIA UR QL AUTO: ABNORMAL /HPF
BASE EX.OXY STD BLDV CALC-SCNC: 95.1 % (ref 60–80)
BASE EXCESS BLDV CALC-SCNC: -13.2 MMOL/L
BASOPHILS # BLD AUTO: 0.01 THOUSANDS/ΜL (ref 0–0.1)
BASOPHILS # BLD AUTO: 0.02 THOUSANDS/ΜL (ref 0–0.1)
BASOPHILS NFR BLD AUTO: 0 % (ref 0–1)
BASOPHILS NFR BLD AUTO: 0 % (ref 0–1)
BILIRUB DIRECT SERPL-MCNC: 0.66 MG/DL (ref 0–0.2)
BILIRUB SERPL-MCNC: 0.99 MG/DL (ref 0.2–1)
BILIRUB SERPL-MCNC: 1.11 MG/DL (ref 0.2–1)
BILIRUB SERPL-MCNC: 1.16 MG/DL (ref 0.2–1)
BILIRUB SERPL-MCNC: 1.4 MG/DL (ref 0.2–1)
BILIRUB UR QL STRIP: NEGATIVE
BILIRUB UR QL STRIP: NEGATIVE
BLD GP AB SCN SERPL QL: NEGATIVE
BPU ID: NORMAL
BPU ID: NORMAL
BUN SERPL-MCNC: 82 MG/DL (ref 5–25)
BUN SERPL-MCNC: 88 MG/DL (ref 5–25)
BUN SERPL-MCNC: 88 MG/DL (ref 5–25)
BUN SERPL-MCNC: 91 MG/DL (ref 5–25)
CALCIUM ALBUM COR SERPL-MCNC: 13.7 MG/DL (ref 8.3–10.1)
CALCIUM ALBUM COR SERPL-MCNC: 13.9 MG/DL (ref 8.3–10.1)
CALCIUM ALBUM COR SERPL-MCNC: 14.1 MG/DL (ref 8.3–10.1)
CALCIUM SERPL-MCNC: 12.1 MG/DL (ref 8.3–10.1)
CALCIUM SERPL-MCNC: 12.7 MG/DL (ref 8.3–10.1)
CALCIUM SERPL-MCNC: 12.8 MG/DL (ref 8.3–10.1)
CALCIUM SERPL-MCNC: 13.1 MG/DL (ref 8.3–10.1)
CARDIAC TROPONIN I PNL SERPL HS: 45 NG/L
CARDIAC TROPONIN I PNL SERPL HS: 55 NG/L
CARDIAC TROPONIN I PNL SERPL HS: 55 NG/L
CHLORIDE SERPL-SCNC: 100 MMOL/L (ref 100–108)
CHLORIDE SERPL-SCNC: 103 MMOL/L (ref 100–108)
CHLORIDE SERPL-SCNC: 104 MMOL/L (ref 100–108)
CHLORIDE SERPL-SCNC: 109 MMOL/L (ref 100–108)
CK SERPL-CCNC: 30 U/L (ref 26–192)
CLARITY UR: ABNORMAL
CLARITY UR: CLEAR
CO2 SERPL-SCNC: 18 MMOL/L (ref 21–32)
CO2 SERPL-SCNC: 23 MMOL/L (ref 21–32)
CO2 SERPL-SCNC: 25 MMOL/L (ref 21–32)
CO2 SERPL-SCNC: 25 MMOL/L (ref 21–32)
COLOR UR: ABNORMAL
COLOR UR: YELLOW
CREAT SERPL-MCNC: 2.79 MG/DL (ref 0.6–1.3)
CREAT SERPL-MCNC: 2.88 MG/DL (ref 0.6–1.3)
CREAT SERPL-MCNC: 2.94 MG/DL (ref 0.6–1.3)
CREAT SERPL-MCNC: 3.33 MG/DL (ref 0.6–1.3)
CREAT UR-MCNC: 101 MG/DL
CREAT UR-MCNC: 109 MG/DL
CREAT UR-MCNC: 109 MG/DL
E WAVE DECELERATION TIME: 198 MS
EOSINOPHIL # BLD AUTO: 0 THOUSAND/ΜL (ref 0–0.61)
EOSINOPHIL # BLD AUTO: 0.01 THOUSAND/ΜL (ref 0–0.61)
EOSINOPHIL NFR BLD AUTO: 0 % (ref 0–6)
EOSINOPHIL NFR BLD AUTO: 0 % (ref 0–6)
ERYTHROCYTE [DISTWIDTH] IN BLOOD BY AUTOMATED COUNT: 16 % (ref 11.6–15.1)
ERYTHROCYTE [DISTWIDTH] IN BLOOD BY AUTOMATED COUNT: 16.3 % (ref 11.6–15.1)
ERYTHROCYTE [DISTWIDTH] IN BLOOD BY AUTOMATED COUNT: 16.5 % (ref 11.6–15.1)
ERYTHROCYTE [DISTWIDTH] IN BLOOD BY AUTOMATED COUNT: 17 % (ref 11.6–15.1)
EST. AVERAGE GLUCOSE BLD GHB EST-MCNC: 137 MG/DL
FLUAV RNA RESP QL NAA+PROBE: NEGATIVE
FLUBV RNA RESP QL NAA+PROBE: NEGATIVE
FRACTIONAL SHORTENING: 30 % (ref 28–44)
GFR SERPL CREATININE-BSD FRML MDRD: 11 ML/MIN/1.73SQ M
GFR SERPL CREATININE-BSD FRML MDRD: 13 ML/MIN/1.73SQ M
GFR SERPL CREATININE-BSD FRML MDRD: 13 ML/MIN/1.73SQ M
GFR SERPL CREATININE-BSD FRML MDRD: 14 ML/MIN/1.73SQ M
GLUCOSE SERPL-MCNC: 100 MG/DL (ref 65–140)
GLUCOSE SERPL-MCNC: 102 MG/DL (ref 65–140)
GLUCOSE SERPL-MCNC: 117 MG/DL (ref 65–140)
GLUCOSE SERPL-MCNC: 124 MG/DL (ref 65–140)
GLUCOSE SERPL-MCNC: 137 MG/DL (ref 65–140)
GLUCOSE SERPL-MCNC: 161 MG/DL (ref 65–140)
GLUCOSE SERPL-MCNC: 164 MG/DL (ref 65–140)
GLUCOSE SERPL-MCNC: 21 MG/DL (ref 65–140)
GLUCOSE SERPL-MCNC: 256 MG/DL (ref 65–140)
GLUCOSE SERPL-MCNC: 26 MG/DL (ref 65–140)
GLUCOSE SERPL-MCNC: 285 MG/DL (ref 65–140)
GLUCOSE SERPL-MCNC: 50 MG/DL (ref 65–140)
GLUCOSE SERPL-MCNC: 57 MG/DL (ref 65–140)
GLUCOSE SERPL-MCNC: 65 MG/DL (ref 65–140)
GLUCOSE SERPL-MCNC: 66 MG/DL (ref 65–140)
GLUCOSE SERPL-MCNC: 70 MG/DL (ref 65–140)
GLUCOSE SERPL-MCNC: 84 MG/DL (ref 65–140)
GLUCOSE SERPL-MCNC: 88 MG/DL (ref 65–140)
GLUCOSE SERPL-MCNC: 93 MG/DL (ref 65–140)
GLUCOSE UR STRIP-MCNC: NEGATIVE MG/DL
GLUCOSE UR STRIP-MCNC: NEGATIVE MG/DL
HBA1C MFR BLD: 6.4 %
HCO3 BLDV-SCNC: 14.3 MMOL/L (ref 24–30)
HCT VFR BLD AUTO: 28 % (ref 34.8–46.1)
HCT VFR BLD AUTO: 28.5 % (ref 34.8–46.1)
HCT VFR BLD AUTO: 31.7 % (ref 34.8–46.1)
HCT VFR BLD AUTO: 35.3 % (ref 34.8–46.1)
HGB BLD-MCNC: 10.2 G/DL (ref 11.5–15.4)
HGB BLD-MCNC: 11 G/DL (ref 11.5–15.4)
HGB BLD-MCNC: 8.6 G/DL (ref 11.5–15.4)
HGB BLD-MCNC: 8.8 G/DL (ref 11.5–15.4)
HGB UR QL STRIP.AUTO: ABNORMAL
HGB UR QL STRIP.AUTO: NEGATIVE
IMM GRANULOCYTES # BLD AUTO: 0.03 THOUSAND/UL (ref 0–0.2)
IMM GRANULOCYTES # BLD AUTO: 0.04 THOUSAND/UL (ref 0–0.2)
IMM GRANULOCYTES NFR BLD AUTO: 1 % (ref 0–2)
IMM GRANULOCYTES NFR BLD AUTO: 1 % (ref 0–2)
INR PPP: 1.27 (ref 0.84–1.19)
INTERVENTRICULAR SEPTUM IN DIASTOLE (PARASTERNAL SHORT AXIS VIEW): 0.8 CM
KETONES UR STRIP-MCNC: NEGATIVE MG/DL
KETONES UR STRIP-MCNC: NEGATIVE MG/DL
LACTATE SERPL-SCNC: 6.6 MMOL/L (ref 0.5–2)
LACTATE SERPL-SCNC: 8.8 MMOL/L (ref 0.5–2)
LDH SERPL-CCNC: 833 U/L (ref 81–234)
LEFT ATRIUM AREA SYSTOLE SINGLE PLANE A4C: 15.2 CM2
LEFT INTERNAL DIMENSION IN SYSTOLE: 3.1 CM (ref 2.1–4)
LEFT VENTRICULAR INTERNAL DIMENSION IN DIASTOLE: 4.4 CM (ref 3.99–5.94)
LEFT VENTRICULAR POSTERIOR WALL IN END DIASTOLE: 0.8 CM
LEFT VENTRICULAR STROKE VOLUME: 52 ML
LEUKOCYTE ESTERASE UR QL STRIP: ABNORMAL
LEUKOCYTE ESTERASE UR QL STRIP: NEGATIVE
LYMPHOCYTES # BLD AUTO: 0.55 THOUSANDS/ΜL (ref 0.6–4.47)
LYMPHOCYTES # BLD AUTO: 0.62 THOUSANDS/ΜL (ref 0.6–4.47)
LYMPHOCYTES NFR BLD AUTO: 12 % (ref 14–44)
LYMPHOCYTES NFR BLD AUTO: 15 % (ref 14–44)
MAGNESIUM SERPL-MCNC: 1.8 MG/DL (ref 1.6–2.6)
MAGNESIUM SERPL-MCNC: 1.9 MG/DL (ref 1.6–2.6)
MCH RBC QN AUTO: 27.2 PG (ref 26.8–34.3)
MCH RBC QN AUTO: 27.3 PG (ref 26.8–34.3)
MCH RBC QN AUTO: 27.6 PG (ref 26.8–34.3)
MCH RBC QN AUTO: 27.6 PG (ref 26.8–34.3)
MCHC RBC AUTO-ENTMCNC: 30.2 G/DL (ref 31.4–37.4)
MCHC RBC AUTO-ENTMCNC: 31.2 G/DL (ref 31.4–37.4)
MCHC RBC AUTO-ENTMCNC: 31.4 G/DL (ref 31.4–37.4)
MCHC RBC AUTO-ENTMCNC: 32.2 G/DL (ref 31.4–37.4)
MCV RBC AUTO: 86 FL (ref 82–98)
MCV RBC AUTO: 87 FL (ref 82–98)
MCV RBC AUTO: 89 FL (ref 82–98)
MCV RBC AUTO: 90 FL (ref 82–98)
MICROALBUMIN UR-MCNC: 21.9 MG/L (ref 0–20)
MICROALBUMIN/CREAT 24H UR: 22 MG/G CREATININE (ref 0–30)
MONOCYTES # BLD AUTO: 0.4 THOUSAND/ΜL (ref 0.17–1.22)
MONOCYTES # BLD AUTO: 0.47 THOUSAND/ΜL (ref 0.17–1.22)
MONOCYTES NFR BLD AUTO: 13 % (ref 4–12)
MONOCYTES NFR BLD AUTO: 8 % (ref 4–12)
MV E'TISSUE VEL-SEP: 5 CM/S
MV PEAK A VEL: 1.18 M/S
MV PEAK E VEL: 63 CM/S
MV STENOSIS PRESSURE HALF TIME: 0 MS
NEUTROPHILS # BLD AUTO: 2.56 THOUSANDS/ΜL (ref 1.85–7.62)
NEUTROPHILS # BLD AUTO: 4.19 THOUSANDS/ΜL (ref 1.85–7.62)
NEUTS SEG NFR BLD AUTO: 71 % (ref 43–75)
NEUTS SEG NFR BLD AUTO: 79 % (ref 43–75)
NITRITE UR QL STRIP: NEGATIVE
NITRITE UR QL STRIP: NEGATIVE
NON-SQ EPI CELLS URNS QL MICRO: ABNORMAL /HPF
NRBC BLD AUTO-RTO: 0 /100 WBCS
NRBC BLD AUTO-RTO: 0 /100 WBCS
O2 CT BLDV-SCNC: 13.2 ML/DL
OSMOLALITY UR: 536 MMOL/KG
P AXIS: 70 DEGREES
PCO2 BLDV: 39.6 MM HG (ref 42–50)
PH BLDV: 7.18 [PH] (ref 7.3–7.4)
PH UR STRIP.AUTO: 5 [PH]
PH UR STRIP.AUTO: 6 [PH]
PHOSPHATE SERPL-MCNC: 2.4 MG/DL (ref 2.3–4.1)
PHOSPHATE SERPL-MCNC: 3.3 MG/DL (ref 2.3–4.1)
PLATELET # BLD AUTO: 18 THOUSANDS/UL (ref 149–390)
PLATELET # BLD AUTO: 19 THOUSANDS/UL (ref 149–390)
PLATELET # BLD AUTO: 22 THOUSANDS/UL (ref 149–390)
PLATELET # BLD AUTO: 5 THOUSANDS/UL (ref 149–390)
PMV BLD AUTO: 11.3 FL (ref 8.9–12.7)
PO2 BLDV: 117.8 MM HG (ref 35–45)
POTASSIUM SERPL-SCNC: 3.4 MMOL/L (ref 3.5–5.3)
POTASSIUM SERPL-SCNC: 3.5 MMOL/L (ref 3.5–5.3)
POTASSIUM SERPL-SCNC: 3.5 MMOL/L (ref 3.5–5.3)
POTASSIUM SERPL-SCNC: 4 MMOL/L (ref 3.5–5.3)
PR INTERVAL: 160 MS
PROCALCITONIN SERPL-MCNC: 2.51 NG/ML
PROT SERPL-MCNC: 5 G/DL (ref 6.4–8.2)
PROT SERPL-MCNC: 5.7 G/DL (ref 6.4–8.2)
PROT SERPL-MCNC: 6.3 G/DL (ref 6.4–8.2)
PROT SERPL-MCNC: 6.4 G/DL (ref 6.4–8.2)
PROT UR STRIP-MCNC: NEGATIVE MG/DL
PROT UR STRIP-MCNC: NEGATIVE MG/DL
PROT UR-MCNC: 35 MG/DL
PROT/CREAT UR: 0.32 MG/G{CREAT} (ref 0–0.1)
PROTHROMBIN TIME: 15.3 SECONDS (ref 11.6–14.5)
PTH-INTACT SERPL-MCNC: 10.5 PG/ML (ref 18.4–80.1)
PTH-INTACT SERPL-MCNC: 11.2 PG/ML (ref 18.4–80.1)
QRS AXIS: -39 DEGREES
QRSD INTERVAL: 116 MS
QT INTERVAL: 332 MS
QTC INTERVAL: 451 MS
RBC # BLD AUTO: 3.16 MILLION/UL (ref 3.81–5.12)
RBC # BLD AUTO: 3.22 MILLION/UL (ref 3.81–5.12)
RBC # BLD AUTO: 3.7 MILLION/UL (ref 3.81–5.12)
RBC # BLD AUTO: 3.98 MILLION/UL (ref 3.81–5.12)
RBC #/AREA URNS AUTO: ABNORMAL /HPF
RH BLD: NEGATIVE
RIGHT ATRIUM AREA SYSTOLE A4C: 10.2 CM2
RIGHT VENTRICLE ID DIMENSION: 3.3 CM
RSV RNA RESP QL NAA+PROBE: NEGATIVE
SARS-COV-2 RNA RESP QL NAA+PROBE: NEGATIVE
SL CV AV DECELERATION TIME RETROGRADE: 1243 MS
SL CV AV PEAK GRADIENT RETROGRADE: 49 MMHG
SL CV LV EF: 55
SL CV PED ECHO LEFT VENTRICLE DIASTOLIC VOLUME (MOD BIPLANE) 2D: 90 ML
SL CV PED ECHO LEFT VENTRICLE SYSTOLIC VOLUME (MOD BIPLANE) 2D: 37 ML
SODIUM 24H UR-SCNC: 20 MOL/L
SODIUM SERPL-SCNC: 135 MMOL/L (ref 136–145)
SODIUM SERPL-SCNC: 136 MMOL/L (ref 136–145)
SODIUM SERPL-SCNC: 139 MMOL/L (ref 136–145)
SODIUM SERPL-SCNC: 142 MMOL/L (ref 136–145)
SP GR UR STRIP.AUTO: 1.02 (ref 1–1.03)
SP GR UR STRIP.AUTO: 1.02 (ref 1–1.03)
SPECIMEN EXPIRATION DATE: NORMAL
T WAVE AXIS: 118 DEGREES
TRICUSPID VALVE S': 0.6 CM/S
TSH SERPL DL<=0.05 MIU/L-ACNC: 0.5 UIU/ML (ref 0.36–3.74)
TSH SERPL DL<=0.05 MIU/L-ACNC: 0.53 UIU/ML (ref 0.36–3.74)
UNIT DISPENSE STATUS: NORMAL
UNIT DISPENSE STATUS: NORMAL
UNIT PRODUCT CODE: NORMAL
UNIT PRODUCT CODE: NORMAL
UNIT PRODUCT VOLUME: 244 ML
UNIT PRODUCT VOLUME: 300 ML
UNIT RH: NORMAL
UNIT RH: NORMAL
URATE SERPL-MCNC: 10.5 MG/DL (ref 2–6.8)
URATE SERPL-MCNC: 15.7 MG/DL (ref 2–6.8)
UROBILINOGEN UR QL STRIP.AUTO: 0.2 E.U./DL
UROBILINOGEN UR QL STRIP.AUTO: 1 E.U./DL
VENTRICULAR RATE: 111 BPM
WBC # BLD AUTO: 3.32 THOUSAND/UL (ref 4.31–10.16)
WBC # BLD AUTO: 3.63 THOUSAND/UL (ref 4.31–10.16)
WBC # BLD AUTO: 4.59 THOUSAND/UL (ref 4.31–10.16)
WBC # BLD AUTO: 5.27 THOUSAND/UL (ref 4.31–10.16)
WBC #/AREA URNS AUTO: ABNORMAL /HPF
Z-SCORE OF LEFT VENTRICULAR DIMENSION IN END SYSTOLE: -1.01

## 2022-01-01 PROCEDURE — 70450 CT HEAD/BRAIN W/O DYE: CPT

## 2022-01-01 PROCEDURE — 74176 CT ABD & PELVIS W/O CONTRAST: CPT

## 2022-01-01 PROCEDURE — 84100 ASSAY OF PHOSPHORUS: CPT | Performed by: HOSPITALIST

## 2022-01-01 PROCEDURE — 99285 EMERGENCY DEPT VISIT HI MDM: CPT | Performed by: EMERGENCY MEDICINE

## 2022-01-01 PROCEDURE — 80053 COMPREHEN METABOLIC PANEL: CPT | Performed by: EMERGENCY MEDICINE

## 2022-01-01 PROCEDURE — 84100 ASSAY OF PHOSPHORUS: CPT | Performed by: NURSE PRACTITIONER

## 2022-01-01 PROCEDURE — 82948 REAGENT STRIP/BLOOD GLUCOSE: CPT

## 2022-01-01 PROCEDURE — 84443 ASSAY THYROID STIM HORMONE: CPT

## 2022-01-01 PROCEDURE — 85027 COMPLETE CBC AUTOMATED: CPT

## 2022-01-01 PROCEDURE — G1004 CDSM NDSC: HCPCS

## 2022-01-01 PROCEDURE — 97163 PT EVAL HIGH COMPLEX 45 MIN: CPT

## 2022-01-01 PROCEDURE — 81003 URINALYSIS AUTO W/O SCOPE: CPT | Performed by: HOSPITALIST

## 2022-01-01 PROCEDURE — 84550 ASSAY OF BLOOD/URIC ACID: CPT | Performed by: NURSE PRACTITIONER

## 2022-01-01 PROCEDURE — 99238 HOSP IP/OBS DSCHRG MGMT 30/<: CPT | Performed by: FAMILY MEDICINE

## 2022-01-01 PROCEDURE — 83036 HEMOGLOBIN GLYCOSYLATED A1C: CPT

## 2022-01-01 PROCEDURE — 84156 ASSAY OF PROTEIN URINE: CPT | Performed by: HOSPITALIST

## 2022-01-01 PROCEDURE — 86900 BLOOD TYPING SEROLOGIC ABO: CPT | Performed by: EMERGENCY MEDICINE

## 2022-01-01 PROCEDURE — 99232 SBSQ HOSP IP/OBS MODERATE 35: CPT | Performed by: FAMILY MEDICINE

## 2022-01-01 PROCEDURE — 80076 HEPATIC FUNCTION PANEL: CPT | Performed by: HOSPITALIST

## 2022-01-01 PROCEDURE — 85025 COMPLETE CBC W/AUTO DIFF WBC: CPT | Performed by: NURSE PRACTITIONER

## 2022-01-01 PROCEDURE — P9037 PLATE PHERES LEUKOREDU IRRAD: HCPCS

## 2022-01-01 PROCEDURE — 80053 COMPREHEN METABOLIC PANEL: CPT

## 2022-01-01 PROCEDURE — 99232 SBSQ HOSP IP/OBS MODERATE 35: CPT | Performed by: INTERNAL MEDICINE

## 2022-01-01 PROCEDURE — 84300 ASSAY OF URINE SODIUM: CPT | Performed by: HOSPITALIST

## 2022-01-01 PROCEDURE — 82805 BLOOD GASES W/O2 SATURATION: CPT | Performed by: PHYSICIAN ASSISTANT

## 2022-01-01 PROCEDURE — 85610 PROTHROMBIN TIME: CPT | Performed by: HOSPITALIST

## 2022-01-01 PROCEDURE — 99223 1ST HOSP IP/OBS HIGH 75: CPT | Performed by: HOSPITALIST

## 2022-01-01 PROCEDURE — 82397 CHEMILUMINESCENT ASSAY: CPT | Performed by: HOSPITALIST

## 2022-01-01 PROCEDURE — 83970 ASSAY OF PARATHORMONE: CPT

## 2022-01-01 PROCEDURE — 85025 COMPLETE CBC W/AUTO DIFF WBC: CPT | Performed by: EMERGENCY MEDICINE

## 2022-01-01 PROCEDURE — 82570 ASSAY OF URINE CREATININE: CPT | Performed by: HOSPITALIST

## 2022-01-01 PROCEDURE — 93306 TTE W/DOPPLER COMPLETE: CPT

## 2022-01-01 PROCEDURE — 83735 ASSAY OF MAGNESIUM: CPT | Performed by: NURSE PRACTITIONER

## 2022-01-01 PROCEDURE — 99222 1ST HOSP IP/OBS MODERATE 55: CPT | Performed by: INTERNAL MEDICINE

## 2022-01-01 PROCEDURE — 93306 TTE W/DOPPLER COMPLETE: CPT | Performed by: INTERNAL MEDICINE

## 2022-01-01 PROCEDURE — 96374 THER/PROPH/DIAG INJ IV PUSH: CPT

## 2022-01-01 PROCEDURE — 82570 ASSAY OF URINE CREATININE: CPT

## 2022-01-01 PROCEDURE — 86901 BLOOD TYPING SEROLOGIC RH(D): CPT | Performed by: EMERGENCY MEDICINE

## 2022-01-01 PROCEDURE — 80053 COMPREHEN METABOLIC PANEL: CPT | Performed by: PHYSICIAN ASSISTANT

## 2022-01-01 PROCEDURE — 83935 ASSAY OF URINE OSMOLALITY: CPT | Performed by: HOSPITALIST

## 2022-01-01 PROCEDURE — 84443 ASSAY THYROID STIM HORMONE: CPT | Performed by: HOSPITALIST

## 2022-01-01 PROCEDURE — 99233 SBSQ HOSP IP/OBS HIGH 50: CPT | Performed by: INTERNAL MEDICINE

## 2022-01-01 PROCEDURE — 0241U HB NFCT DS VIR RESP RNA 4 TRGT: CPT | Performed by: PHYSICIAN ASSISTANT

## 2022-01-01 PROCEDURE — 84484 ASSAY OF TROPONIN QUANT: CPT | Performed by: HOSPITALIST

## 2022-01-01 PROCEDURE — 30233R1 TRANSFUSION OF NONAUTOLOGOUS PLATELETS INTO PERIPHERAL VEIN, PERCUTANEOUS APPROACH: ICD-10-PCS | Performed by: HOSPITALIST

## 2022-01-01 PROCEDURE — 36415 COLL VENOUS BLD VENIPUNCTURE: CPT | Performed by: HOSPITALIST

## 2022-01-01 PROCEDURE — 99223 1ST HOSP IP/OBS HIGH 75: CPT | Performed by: INTERNAL MEDICINE

## 2022-01-01 PROCEDURE — P9035 PLATELET PHERES LEUKOREDUCED: HCPCS

## 2022-01-01 PROCEDURE — 82043 UR ALBUMIN QUANTITATIVE: CPT

## 2022-01-01 PROCEDURE — 85027 COMPLETE CBC AUTOMATED: CPT | Performed by: HOSPITALIST

## 2022-01-01 PROCEDURE — 80048 BASIC METABOLIC PNL TOTAL CA: CPT | Performed by: HOSPITALIST

## 2022-01-01 PROCEDURE — 99232 SBSQ HOSP IP/OBS MODERATE 35: CPT | Performed by: PHYSICIAN ASSISTANT

## 2022-01-01 PROCEDURE — 99285 EMERGENCY DEPT VISIT HI MDM: CPT

## 2022-01-01 PROCEDURE — 86850 RBC ANTIBODY SCREEN: CPT | Performed by: EMERGENCY MEDICINE

## 2022-01-01 PROCEDURE — 93010 ELECTROCARDIOGRAM REPORT: CPT | Performed by: INTERNAL MEDICINE

## 2022-01-01 PROCEDURE — 84145 PROCALCITONIN (PCT): CPT | Performed by: PHYSICIAN ASSISTANT

## 2022-01-01 PROCEDURE — 81001 URINALYSIS AUTO W/SCOPE: CPT | Performed by: NURSE PRACTITIONER

## 2022-01-01 PROCEDURE — 83605 ASSAY OF LACTIC ACID: CPT | Performed by: PHYSICIAN ASSISTANT

## 2022-01-01 PROCEDURE — 83970 ASSAY OF PARATHORMONE: CPT | Performed by: HOSPITALIST

## 2022-01-01 PROCEDURE — 71250 CT THORAX DX C-: CPT

## 2022-01-01 PROCEDURE — 72110 X-RAY EXAM L-2 SPINE 4/>VWS: CPT

## 2022-01-01 PROCEDURE — 99233 SBSQ HOSP IP/OBS HIGH 50: CPT | Performed by: FAMILY MEDICINE

## 2022-01-01 PROCEDURE — 92610 EVALUATE SWALLOWING FUNCTION: CPT

## 2022-01-01 PROCEDURE — 83615 LACTATE (LD) (LDH) ENZYME: CPT | Performed by: NURSE PRACTITIONER

## 2022-01-01 PROCEDURE — 99214 OFFICE O/P EST MOD 30 MIN: CPT | Performed by: NURSE PRACTITIONER

## 2022-01-01 PROCEDURE — 96361 HYDRATE IV INFUSION ADD-ON: CPT

## 2022-01-01 PROCEDURE — 97167 OT EVAL HIGH COMPLEX 60 MIN: CPT

## 2022-01-01 PROCEDURE — 87040 BLOOD CULTURE FOR BACTERIA: CPT | Performed by: PHYSICIAN ASSISTANT

## 2022-01-01 PROCEDURE — 82550 ASSAY OF CK (CPK): CPT | Performed by: EMERGENCY MEDICINE

## 2022-01-01 PROCEDURE — 93005 ELECTROCARDIOGRAM TRACING: CPT

## 2022-01-01 PROCEDURE — 83735 ASSAY OF MAGNESIUM: CPT | Performed by: HOSPITALIST

## 2022-01-01 PROCEDURE — 36415 COLL VENOUS BLD VENIPUNCTURE: CPT

## 2022-01-01 PROCEDURE — 72125 CT NECK SPINE W/O DYE: CPT

## 2022-01-01 RX ORDER — AMLODIPINE BESYLATE 5 MG/1
5 TABLET ORAL DAILY
Status: DISCONTINUED | OUTPATIENT
Start: 2022-01-01 | End: 2022-01-01

## 2022-01-01 RX ORDER — DEXTROSE AND SODIUM CHLORIDE 5; .9 G/100ML; G/100ML
100 INJECTION, SOLUTION INTRAVENOUS CONTINUOUS
Status: DISCONTINUED | OUTPATIENT
Start: 2022-01-01 | End: 2022-01-01

## 2022-01-01 RX ORDER — LORAZEPAM 2 MG/ML
0.5 INJECTION INTRAMUSCULAR
Status: DISCONTINUED | OUTPATIENT
Start: 2022-01-01 | End: 2022-01-01 | Stop reason: HOSPADM

## 2022-01-01 RX ORDER — DEXTROSE MONOHYDRATE 25 G/50ML
25 INJECTION, SOLUTION INTRAVENOUS ONCE
Status: COMPLETED | OUTPATIENT
Start: 2022-01-01 | End: 2022-01-01

## 2022-01-01 RX ORDER — HALOPERIDOL 5 MG/ML
1 INJECTION INTRAMUSCULAR
Status: DISCONTINUED | OUTPATIENT
Start: 2022-01-01 | End: 2022-01-01 | Stop reason: HOSPADM

## 2022-01-01 RX ORDER — HYDROCHLOROTHIAZIDE 12.5 MG/1
TABLET ORAL
Qty: 90 TABLET | Refills: 3 | Status: SHIPPED | OUTPATIENT
Start: 2022-01-01 | End: 2022-01-01 | Stop reason: HOSPADM

## 2022-01-01 RX ORDER — DEXTROSE MONOHYDRATE 25 G/50ML
INJECTION, SOLUTION INTRAVENOUS
Status: COMPLETED
Start: 2022-01-01 | End: 2022-01-01

## 2022-01-01 RX ORDER — DEXTROSE AND SODIUM CHLORIDE 5; .9 G/100ML; G/100ML
150 INJECTION, SOLUTION INTRAVENOUS CONTINUOUS
Status: DISCONTINUED | OUTPATIENT
Start: 2022-01-01 | End: 2022-01-01 | Stop reason: SDUPTHER

## 2022-01-01 RX ORDER — BRIMONIDINE TARTRATE 2 MG/ML
1 SOLUTION/ DROPS OPHTHALMIC 3 TIMES DAILY
Status: DISCONTINUED | OUTPATIENT
Start: 2022-01-01 | End: 2022-01-01

## 2022-01-01 RX ORDER — ALLOPURINOL 100 MG/1
100 TABLET ORAL DAILY
Status: DISCONTINUED | OUTPATIENT
Start: 2022-01-01 | End: 2022-01-01

## 2022-01-01 RX ORDER — ATORVASTATIN CALCIUM 20 MG/1
20 TABLET, FILM COATED ORAL DAILY
Status: DISCONTINUED | OUTPATIENT
Start: 2022-01-01 | End: 2022-01-01

## 2022-01-01 RX ORDER — POTASSIUM CHLORIDE 20 MEQ/1
40 TABLET, EXTENDED RELEASE ORAL ONCE
Status: DISCONTINUED | OUTPATIENT
Start: 2022-01-01 | End: 2022-01-01

## 2022-01-01 RX ORDER — PANTOPRAZOLE SODIUM 20 MG/1
20 TABLET, DELAYED RELEASE ORAL
Status: DISCONTINUED | OUTPATIENT
Start: 2022-01-01 | End: 2022-01-01

## 2022-01-01 RX ORDER — ACETAMINOPHEN 325 MG/1
650 TABLET ORAL EVERY 6 HOURS PRN
Status: DISCONTINUED | OUTPATIENT
Start: 2022-01-01 | End: 2022-01-01 | Stop reason: HOSPADM

## 2022-01-01 RX ORDER — AMLODIPINE BESYLATE 5 MG/1
TABLET ORAL
Qty: 90 TABLET | Refills: 1 | Status: SHIPPED | OUTPATIENT
Start: 2022-01-01 | End: 2022-01-01 | Stop reason: HOSPADM

## 2022-01-01 RX ORDER — ONDANSETRON 2 MG/ML
4 INJECTION INTRAMUSCULAR; INTRAVENOUS EVERY 6 HOURS PRN
Status: DISCONTINUED | OUTPATIENT
Start: 2022-01-01 | End: 2022-01-01

## 2022-01-01 RX ORDER — DOCUSATE SODIUM 100 MG/1
100 CAPSULE, LIQUID FILLED ORAL 2 TIMES DAILY
Status: DISCONTINUED | OUTPATIENT
Start: 2022-01-01 | End: 2022-01-01

## 2022-01-01 RX ORDER — PREDNISONE 20 MG/1
80 TABLET ORAL DAILY
Status: DISCONTINUED | OUTPATIENT
Start: 2022-01-01 | End: 2022-01-01

## 2022-01-01 RX ORDER — DEXTROSE AND SODIUM CHLORIDE 5; .9 G/100ML; G/100ML
100 INJECTION, SOLUTION INTRAVENOUS CONTINUOUS
Status: DISPENSED | OUTPATIENT
Start: 2022-01-01 | End: 2022-01-01

## 2022-01-01 RX ORDER — HYDROMORPHONE HCL IN WATER/PF 6 MG/30 ML
0.2 PATIENT CONTROLLED ANALGESIA SYRINGE INTRAVENOUS
Status: DISCONTINUED | OUTPATIENT
Start: 2022-01-01 | End: 2022-01-01 | Stop reason: HOSPADM

## 2022-01-01 RX ADMIN — BRIMONIDINE TARTRATE 1 DROP: 2 SOLUTION OPHTHALMIC at 22:40

## 2022-01-01 RX ADMIN — BRIMONIDINE TARTRATE 1 DROP: 2 SOLUTION OPHTHALMIC at 00:41

## 2022-01-01 RX ADMIN — SODIUM CHLORIDE 1000 ML: 0.9 INJECTION, SOLUTION INTRAVENOUS at 19:44

## 2022-01-01 RX ADMIN — ACETAMINOPHEN 650 MG: 325 TABLET, FILM COATED ORAL at 05:31

## 2022-01-01 RX ADMIN — LORAZEPAM 0.5 MG: 2 INJECTION INTRAMUSCULAR; INTRAVENOUS at 15:37

## 2022-01-01 RX ADMIN — DOCUSATE SODIUM 100 MG: 100 CAPSULE, LIQUID FILLED ORAL at 14:27

## 2022-01-01 RX ADMIN — PANTOPRAZOLE SODIUM 20 MG: 20 TABLET, DELAYED RELEASE ORAL at 05:31

## 2022-01-01 RX ADMIN — DEXTROSE MONOHYDRATE 25 ML: 500 INJECTION PARENTERAL at 09:36

## 2022-01-01 RX ADMIN — ONDANSETRON 8 MG: 2 INJECTION INTRAMUSCULAR; INTRAVENOUS at 21:26

## 2022-01-01 RX ADMIN — AMLODIPINE BESYLATE 5 MG: 5 TABLET ORAL at 09:16

## 2022-01-01 RX ADMIN — BRIMONIDINE TARTRATE 1 DROP: 2 SOLUTION OPHTHALMIC at 10:52

## 2022-01-01 RX ADMIN — DEXTROSE MONOHYDRATE 25 ML: 25 INJECTION, SOLUTION INTRAVENOUS at 20:43

## 2022-01-01 RX ADMIN — PREDNISONE 50 MG: 20 TABLET ORAL at 09:15

## 2022-01-01 RX ADMIN — CYANOCOBALAMIN TAB 500 MCG 500 MCG: 500 TAB at 09:15

## 2022-01-01 RX ADMIN — DEXTROSE AND SODIUM CHLORIDE 125 ML/HR: 5; .9 INJECTION, SOLUTION INTRAVENOUS at 00:26

## 2022-01-01 RX ADMIN — BRIMONIDINE TARTRATE 1 DROP: 2 SOLUTION OPHTHALMIC at 16:36

## 2022-01-01 RX ADMIN — DEXTROSE AND SODIUM CHLORIDE 100 ML/HR: 5; .9 INJECTION, SOLUTION INTRAVENOUS at 22:40

## 2022-01-01 RX ADMIN — DEXTROSE MONOHYDRATE 50 ML: 25 INJECTION, SOLUTION INTRAVENOUS at 05:38

## 2022-01-01 RX ADMIN — SODIUM BICARBONATE: 84 INJECTION, SOLUTION INTRAVENOUS at 14:05

## 2022-01-01 RX ADMIN — ALLOPURINOL 100 MG: 100 TABLET ORAL at 09:16

## 2022-01-01 RX ADMIN — DEXTROSE AND SODIUM CHLORIDE 150 ML/HR: 5; .9 INJECTION, SOLUTION INTRAVENOUS at 20:44

## 2022-01-01 RX ADMIN — DOCUSATE SODIUM 100 MG: 100 CAPSULE, LIQUID FILLED ORAL at 09:15

## 2022-01-01 RX ADMIN — PAMIDRONATE DISODIUM 60 MG: 3 INJECTION INTRAVENOUS at 16:55

## 2022-01-01 RX ADMIN — SODIUM CHLORIDE 3 MG: 9 INJECTION, SOLUTION INTRAVENOUS at 16:27

## 2022-01-01 RX ADMIN — BRIMONIDINE TARTRATE 1 DROP: 2 SOLUTION OPHTHALMIC at 08:30

## 2022-01-01 RX ADMIN — BRIMONIDINE TARTRATE 1 DROP: 2 SOLUTION OPHTHALMIC at 15:23

## 2022-01-01 RX ADMIN — DEXTROSE AND SODIUM CHLORIDE 100 ML/HR: 5; .9 INJECTION, SOLUTION INTRAVENOUS at 13:57

## 2022-01-01 RX ADMIN — CYANOCOBALAMIN TAB 500 MCG 500 MCG: 500 TAB at 14:26

## 2022-01-01 RX ADMIN — ONDANSETRON 8 MG: 2 INJECTION INTRAMUSCULAR; INTRAVENOUS at 16:30

## 2022-01-01 RX ADMIN — BRIMONIDINE TARTRATE 1 DROP: 2 SOLUTION OPHTHALMIC at 21:26

## 2022-01-01 RX ADMIN — BRIMONIDINE TARTRATE 1 DROP: 2 SOLUTION OPHTHALMIC at 09:16

## 2022-01-01 RX ADMIN — DEXTROSE AND SODIUM CHLORIDE 125 ML/HR: 5; .9 INJECTION, SOLUTION INTRAVENOUS at 05:30

## 2022-01-01 RX ADMIN — ONDANSETRON 8 MG: 2 INJECTION INTRAMUSCULAR; INTRAVENOUS at 05:36

## 2022-01-01 RX ADMIN — HYDROMORPHONE HYDROCHLORIDE 0.2 MG: 0.2 INJECTION, SOLUTION INTRAMUSCULAR; INTRAVENOUS; SUBCUTANEOUS at 05:43

## 2022-01-24 PROBLEM — R29.898 LEFT LEG WEAKNESS: Status: ACTIVE | Noted: 2022-01-01
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
## 2022-01-24 NOTE — ASSESSMENT & PLAN NOTE
New onset focal weakness of LLE occurred over the weekend with noted 3/5 strength with hip flexion as well as +3 edema and numbness of the leg  Gait is unsteady requiring assist for transfer  With hx of falls, over the weekend with new onset fatigue would like to get head ct to r/o stroke/bleed  Stat order placed  Will also get xr of lumbar spine and basic labs  Referral for PT    F/u in office Friday as scheduled

## 2022-01-24 NOTE — PROGRESS NOTES
Assessment/Plan:    Left leg weakness  New onset focal weakness of LLE occurred over the weekend with noted 3/5 strength with hip flexion as well as +3 edema and numbness of the leg  Gait is unsteady requiring assist for transfer  With hx of falls, over the weekend with new onset fatigue would like to get head ct to r/o stroke/bleed  Stat order placed  Will also get xr of lumbar spine and basic labs  Referral for PT  F/u in office Friday as scheduled    Fall  2 falls at home over the weekend with new onset LLE weakness, numbness, and fatigue  Exam remarkable for LLE weakness with hip flexion + 3 edema LLE  Will get basic labs and ua, stat CT ordered of head to r/o stroke/bleed  Referral to PT  Continue with assist for transfer and ADLs  Fu as planned Friday       Diagnoses and all orders for this visit:    Left leg weakness  -     UA w Reflex to Microscopic w Reflex to Culture  -     Basic metabolic panel; Future  -     Cancel: CT head wo contrast; Future  -     CT head wo contrast; Future  -     XR spine lumbar minimum 4 views non injury; Future  -     Ambulatory Referral to Physical Therapy; Future    Fall, subsequent encounter  -     Cancel: CT head wo contrast; Future  -     CT head wo contrast; Future  -     Ambulatory Referral to Physical Therapy; Future          Subjective:      Patient ID: Darwin Chery is a 80 y o  female  80 year female presented to office with b/l weakness in lower extremities over the last several days with profound fatigue and falls  PMH of HTN, DM, hyperparathyroidism, CKD, lymphoma, HLD, osteoporosis  She does have hx of baseline lower extremity edema, over the last several days with severe Left leg edema from the toe up to the knee, which is inconsistent with her baseline  Unable to get dressed, shower, or do her ADLs due to her fatigue  Typically walks with walker, however unable get around with walker the last few days  She had 2 falls within the last two days  One fall she reported her seat cushion slid out from under her bottom and she was unable to shift back up with her lower extremities  The other fall she states her RLE just "gave out on her "  She was able to catch herself and sit down on the toilet so no injuries sustained  Denies LOC or head strike with either fall  Family states the patient is struggling to speak almost like takes effort out of the patient to talk  Denies SOB, CP, dizziness  The following portions of the patient's history were reviewed and updated as appropriate: allergies, current medications, past family history, past medical history, past social history, past surgical history and problem list     Review of Systems   Constitutional: Positive for fatigue  Negative for activity change, appetite change, chills, diaphoresis, fever and unexpected weight change  HENT: Negative for hearing loss  Eyes: Negative for visual disturbance  Respiratory: Negative for cough, chest tightness and shortness of breath  Cardiovascular: Positive for leg swelling  Negative for chest pain and palpitations  Gastrointestinal: Negative for constipation, diarrhea, nausea and vomiting  Genitourinary: Negative for dysuria and frequency  Musculoskeletal: Negative for arthralgias, back pain and myalgias  Neurological: Positive for weakness and numbness  Negative for dizziness, facial asymmetry, light-headedness and headaches  Psychiatric/Behavioral: Negative for sleep disturbance  Objective:      BP (!) 106/44   Pulse 103   Temp (!) 97 1 °F (36 2 °C)   Ht 5' 1" (1 549 m)   Wt 72 6 kg (160 lb)   SpO2 95%   BMI 30 23 kg/m²          Physical Exam  Vitals reviewed  Constitutional:       General: She is awake  She is not in acute distress  Appearance: Normal appearance  She is well-developed and well-groomed  She is not ill-appearing  HENT:      Head: Normocephalic        Right Ear: Hearing normal       Left Ear: Hearing normal  Eyes:      General: Lids are normal       Extraocular Movements: Extraocular movements intact  Conjunctiva/sclera: Conjunctivae normal       Comments: 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   Neck:      Vascular: Normal carotid pulses  No carotid bruit  Cardiovascular:      Rate and Rhythm: Normal rate and regular rhythm  Pulses: Normal pulses  Heart sounds: Normal heart sounds  No murmur heard  Pulmonary:      Effort: Pulmonary effort is normal       Breath sounds: Normal breath sounds  Abdominal:      General: Bowel sounds are normal       Palpations: Abdomen is soft  Tenderness: There is no abdominal tenderness  Musculoskeletal:         General: Normal range of motion  Right lower leg: No edema  Left lower leg: 3+ Pitting Edema present  Lymphadenopathy:      Cervical: No cervical adenopathy  Skin:     General: Skin is warm and dry  Findings: No ecchymosis or erythema  Neurological:      Mental Status: She is alert and oriented to person, place, and time  Motor: Weakness (generalized weakness, 4/5 BUE, RLE; 3/5 strength LLE with hip flexion ) present  No tremor  Psychiatric:         Attention and Perception: Attention normal          Mood and Affect: Mood normal          Speech: Speech normal          Behavior: Behavior normal  Behavior is cooperative  Thought Content:  Thought content normal          Cognition and Memory: Cognition normal          Judgment: Judgment normal

## 2022-01-24 NOTE — ASSESSMENT & PLAN NOTE
2 falls at home over the weekend with new onset LLE weakness, numbness, and fatigue  Exam remarkable for LLE weakness with hip flexion + 3 edema LLE  Will get basic labs and ua, stat CT ordered of head to r/o stroke/bleed  Referral to PT  Continue with assist for transfer and ADLs    Fu as planned Friday

## 2022-01-24 NOTE — PROGRESS NOTES
80 year female presented to office with b/l weakness in lower extremities  Left leg noted swelling and difficulty walking  Unable to get dress, shower or do her ADLs  Typically walks with walker, however unable get around with walker  Has had 2 falls within the last two days  Denies LOC or head strike  Family states the patient is struggling to speak almost like takes effort out of the patient to talk  Denies SOB, CP, dizziness

## 2022-01-26 PROBLEM — N18.32 ACUTE RENAL FAILURE SUPERIMPOSED ON STAGE 3B CHRONIC KIDNEY DISEASE (HCC): Status: ACTIVE | Noted: 2022-01-01

## 2022-01-26 PROBLEM — E21.3 HYPERPARATHYROIDISM (HCC): Chronic | Status: ACTIVE | Noted: 2021-01-01

## 2022-01-26 PROBLEM — R53.1 GENERALIZED WEAKNESS: Status: ACTIVE | Noted: 2022-01-01

## 2022-01-26 PROBLEM — E11.649 HYPOGLYCEMIA ASSOCIATED WITH TYPE 2 DIABETES MELLITUS (HCC): Status: ACTIVE | Noted: 2022-01-01

## 2022-01-26 PROBLEM — N17.9 ACUTE RENAL FAILURE SUPERIMPOSED ON STAGE 3B CHRONIC KIDNEY DISEASE (HCC): Status: ACTIVE | Noted: 2022-01-01

## 2022-01-26 PROBLEM — Z85.118 HISTORY OF LUNG CANCER: Chronic | Status: ACTIVE | Noted: 2018-03-16

## 2022-01-26 PROBLEM — R74.01 TRANSAMINITIS: Status: ACTIVE | Noted: 2022-01-01

## 2022-01-26 PROBLEM — D69.6 THROMBOCYTOPENIA (HCC): Status: ACTIVE | Noted: 2022-01-01

## 2022-01-26 NOTE — TELEPHONE ENCOUNTER
Spoke to patient and daughter in law regarding labs dated 1/26 showing acute thrombocytopenia with platelets down to 90D from 338K two months ago  Daughter in law reports only new meds started 12/2021 by Cardio were ASA and atorvastatin  Patient seen yesterday in office for leg weakness, multiple falls, extreme exhaustion  Daughter in law and patient's son have had difficulty with transfers    Recommend ED transfer for further evaluation of acute severe thrombocytopenia, frailty  Recommend d/c ASA and statin

## 2022-01-26 NOTE — ED PROVIDER NOTES
History  Chief Complaint   Patient presents with    Weakness - Generalized     platelets were found to be 22 told to come to ER feeling weak for past week     HPI     79 yo F with past medical hx of B cell s/p treatment, diabetes and hyperlipidemia who presents for evaluation of generalized weakness  Patient is here with her son who helps provide history  Patient has been having increasing weakness over the past two weeks  She had two falls over the past few days  She states she fell because her legs gave out  Denies headstrike or LOC  Denies injuries from the falls  Patient states she has had increasing difficulty with walking over the past two weeks due to weakness  She also endorses decreased PO intake  Denies fevers, chills, chest pain, shortness of breath, abdominal pain, nausea, vomiting or diarrhea  Patient had outpatient labs performed by PCP which showed hypercalcemia, LAURA and thrombocytopenia so she was sent to ER  Prior to Admission Medications   Prescriptions Last Dose Informant Patient Reported? Taking? Azopt 1 % ophthalmic suspension  Self Yes No   Sig: 3 (three) times a day    Cyanocobalamin (VITAMIN B-12 PO)  Self Yes No   Sig: Take 1 tablet by mouth  Insulin Pen Needle (BD ULTRA-FINE PEN NEEDLES) 29G X 12 7MM MISC  Self No No   Sig: by Does not apply route 2 (two) times a day   Lumigan 0 01 % ophthalmic drops  Self Yes No   Multiple Vitamin (MULTIVITAMIN) tablet  Self Yes No   Sig: Take 1 tablet by mouth daily  acetaminophen (TYLENOL) 325 mg tablet  Self No No   Sig: Take 2 tablets (650 mg total) by mouth every 6 (six) hours as needed for mild pain, headaches or fever     amLODIPine (NORVASC) 5 mg tablet   No No   Sig: TAKE 1 TABLET DAILY   aspirin (ECOTRIN LOW STRENGTH) 81 mg EC tablet   No No   Sig: Take 1 tablet (81 mg total) by mouth daily   atorvastatin (LIPITOR) 20 mg tablet   No No   Sig: Take 1 tablet (20 mg total) by mouth daily   brimonidine (Alphagan P) 0 1 %  Self Yes No Sig: Alphagan P 0 1 % eye drops   ADMINISTER 1 DROP OPHTHALMICALLY IN THE RIGHT EYE THREE TIMES A DAY  90 DAY SUPPLY   cholecalciferol (VITAMIN D3) 1,000 units tablet  Self Yes No   Sig: Take 2,000 Units by mouth daily   hydrochlorothiazide (HYDRODIURIL) 12 5 mg tablet   No No   Sig: TAKE 1 TABLET DAILY   insulin glargine (Basaglar KwikPen) 100 units/mL injection pen  Self No No   Sig: Inject 34 Units under the skin daily   omeprazole (PriLOSEC) 20 mg delayed release capsule  Self Yes No   Sig: Take 20 mg by mouth daily  Facility-Administered Medications: None       Past Medical History:   Diagnosis Date    Anemia     Bladder cancer ([facility] )     Cancer ([facility] )     Diabetes mellitus ([facility] )     GERD (gastroesophageal reflux disease)     GERD without esophagitis     Hyperlipidemia     last assessed 4/12/13    Hypertension     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[facility] )     last assessed  7/12/13    Liver cancer ([facility] )     Lung cancer ([facility] )     Squamous Cell Lung CA -Right     Lymphoma ([facility] )     Muscle strain of left lower leg 4/14/2021    Non Hodgkin's lymphoma ([facility] )     last assessed 1/20/14    Osteoporosis     last assessed  4/12/13    Right cataract     last assessed  3/2/15    Shingles        Past Surgical History:   Procedure Laterality Date    CATARACT EXTRACTION      EYE SURGERY      LIVER LOBECTOMY      LUNG REMOVAL, PARTIAL Right     lobectomy - onset approx 3/12/12 - right lower lobe       Family History   Problem Relation Age of Onset    Brain cancer Mother     Other Mother         adenocarcinoma of the accessor sinuses    Cancer Mother     Diabetes Father     Leukemia Father     Cirrhosis Sister         Alcoholic's cirrhosis - Laejose's     I have reviewed and agree with the history as documented      E-Cigarette/Vaping     E-Cigarette/Vaping Substances     Social History     Tobacco Use    Smoking status: Former Smoker     Packs/day: 2 00     Years: 40 00     Pack years: 80 00     Start date: 1951     Quit date:      Years since quittin 0    Smokeless tobacco: Never Used    Tobacco comment: 60 pack year hx, Quit 15 yrs ago  as per [facility]   Substance Use Topics    Alcohol use: No    Drug use: No        Review of Systems   Constitutional: Negative for appetite change, chills and fever  HENT: Negative for congestion, rhinorrhea and sore throat  Respiratory: Negative for cough and shortness of breath  Cardiovascular: Negative for chest pain  Gastrointestinal: Negative for abdominal pain, diarrhea, nausea and vomiting  Genitourinary: Negative for dysuria, frequency, hematuria and urgency  Musculoskeletal: Negative for arthralgias and myalgias  Skin: Negative for rash  Neurological: Positive for weakness  Negative for dizziness, light-headedness, numbness and headaches  All other systems reviewed and are negative  Physical Exam  ED Triage Vitals   Temperature Pulse Respirations Blood Pressure SpO2   22 1743 22 1743 22 1743 22 1743 22 1743   98 1 °F (36 7 °C) (!) 110 18 128/59 95 %      Temp Source Heart Rate Source Patient Position - Orthostatic VS BP Location FiO2 (%)   22 1743 22 1743 22 1743 22 1743 --   Oral Monitor Lying Right arm       Pain Score       22 2100       No Pain             Orthostatic Vital Signs  Vitals:    22 0653 22 0838 22 0910 22 0956   BP: (!) 109/45 123/99 132/50 125/50   Pulse:  91 98 96   Patient Position - Orthostatic VS:           Physical Exam  Vitals and nursing note reviewed  Constitutional:       General: She is not in acute distress  Appearance: Normal appearance  She is well-developed and normal weight  She is not ill-appearing, toxic-appearing or diaphoretic  HENT:      Head: Normocephalic and atraumatic        Right Ear: External ear normal       Left Ear: External ear normal       Mouth/Throat:      Mouth: Mucous membranes are moist  Pharynx: Oropharynx is clear  Eyes:      Extraocular Movements: Extraocular movements intact  Conjunctiva/sclera: Conjunctivae normal       Pupils: Pupils are equal, round, and reactive to light  Cardiovascular:      Rate and Rhythm: Normal rate and regular rhythm  Pulses: Normal pulses  Heart sounds: Normal heart sounds  No murmur heard  No friction rub  No gallop  Pulmonary:      Effort: Pulmonary effort is normal  No respiratory distress  Breath sounds: Normal breath sounds  No wheezing or rales  Abdominal:      General: There is no distension  Palpations: Abdomen is soft  Tenderness: There is no abdominal tenderness  There is no guarding or rebound  Musculoskeletal:         General: No tenderness  Cervical back: Neck supple  Right lower leg: No edema  Left lower leg: No edema  Skin:     General: Skin is warm and dry  Coloration: Skin is not pale  Findings: No erythema or rash  Neurological:      Mental Status: She is alert and oriented to person, place, and time  Cranial Nerves: No cranial nerve deficit  Sensory: No sensory deficit  Motor: Weakness present  Comments: 2/5 strength in bilateral lower extremities  5/5 strength in b/l upper extremities      Psychiatric:         Mood and Affect: Mood normal          Behavior: Behavior normal          ED Medications  Medications   brimonidine tartrate 0 2 % ophthalmic solution 1 drop (1 drop Right Eye Given 1/28/22 0916)   cyanocobalamin (VITAMIN B-12) tablet 500 mcg (500 mcg Oral Given 1/28/22 0915)   pantoprazole (PROTONIX) EC tablet 20 mg (20 mg Oral Given 1/28/22 0531)   dextrose 5 % and sodium chloride 0 9 % infusion (0 mL/hr Intravenous Stopped 1/27/22 1350)   acetaminophen (TYLENOL) tablet 650 mg (650 mg Oral Given 1/28/22 0531)   docusate sodium (COLACE) capsule 100 mg (100 mg Oral Given 1/28/22 0915)   ondansetron (ZOFRAN) injection 4 mg (has no administration in time range)   insulin lispro (HumaLOG) 100 units/mL subcutaneous injection 1-5 Units (1 Units Subcutaneous Not Given 1/28/22 1034)   insulin lispro (HumaLOG) 100 units/mL subcutaneous injection 1-5 Units (1 Units Subcutaneous Not Given 1/27/22 2133)   amLODIPine (NORVASC) tablet 5 mg (5 mg Oral Given 1/28/22 0916)   potassium chloride (K-DUR,KLOR-CON) CR tablet 40 mEq (0 mEq Oral Hold 1/27/22 1205)   allopurinol (ZYLOPRIM) tablet 100 mg (100 mg Oral Given 1/28/22 0916)   predniSONE tablet 50 mg (50 mg Oral Given 1/28/22 0915)   sodium bicarbonate 75 mEq in dextrose 5 % and sodium chloride 0 45 % (D5%-0 45%NaCl) 1,000 mL infusion (has no administration in time range)   sodium chloride 0 9 % bolus 1,000 mL (0 mL Intravenous Stopped 1/26/22 2043)   dextrose 50 % IV solution 25 mL (25 mL Intravenous Given 1/26/22 2043)   dextrose 50 % IV solution **ADS Override Pull** (50 mL  Given 1/27/22 0538)   dextrose 50 % IV solution 25 mL (25 mL Intravenous Given 1/27/22 0936)   pamidronate (AREDIA) 60 mg in sodium chloride 0 9 % 500 mL IVPB (60 mg Intravenous New Bag 1/27/22 1655)   rasburicase (ELITEK) 3 mg in sodium chloride 0 9 % 52 mL IVPB (0 mg Intravenous Stopped 1/27/22 1657)       Diagnostic Studies  Results Reviewed     Procedure Component Value Units Date/Time    Comprehensive metabolic panel [[id number]]  (Abnormal) Collected: 01/28/22 0508    Lab Status: Final result Specimen: Blood from Arm, Right Updated: 01/28/22 0715     Sodium 142 mmol/L      Potassium 3 5 mmol/L      Chloride 109 mmol/L      CO2 18 mmol/L      ANION GAP 15 mmol/L      BUN 82 mg/dL      Creatinine 2 79 mg/dL      Glucose 57 mg/dL      Calcium 12 1 mg/dL      Corrected Calcium 13 7 mg/dL       U/L       U/L      Alkaline Phosphatase 518 U/L      Total Protein 5 0 g/dL      Albumin 2 0 g/dL      Total Bilirubin 1 11 mg/dL      eGFR 14 ml/min/1 73sq m     Narrative:      [place] guidelines for Chronic Kidney Disease (CKD):     Stage 1 with normal or high GFR (GFR > 90 mL/min/1 73 square meters)    Stage 2 Mild CKD (GFR = 60-89 mL/min/1 73 square meters)    Stage 3A Moderate CKD (GFR = 45-59 mL/min/1 73 square meters)    Stage 3B Moderate CKD (GFR = 30-44 mL/min/1 73 square meters)    Stage 4 Severe CKD (GFR = 15-29 mL/min/1 73 square meters)    Stage 5 End Stage CKD (GFR <15 mL/min/1 73 square meters)  Note: GFR calculation is accurate only with a steady state creatinine    Uric acid [[id number]]  (Abnormal) Collected: 01/28/22 0508    Lab Status: Final result Specimen: Blood from Arm, Right Updated: 01/28/22 0706     Uric Acid 10 5 mg/dL     Magnesium [[id number]]  (Normal) Collected: 01/28/22 0508    Lab Status: Final result Specimen: Blood from Arm, Right Updated: 01/28/22 0706     Magnesium 1 8 mg/dL     Phosphorus [[id number]]  (Normal) Collected: 01/28/22 0508    Lab Status: Final result Specimen: Blood from Arm, Right Updated: 01/28/22 0706     Phosphorus 3 3 mg/dL     CBC and differential [[id number]]  (Abnormal) Collected: 01/28/22 0508    Lab Status: Final result Specimen: Blood from Arm, Right Updated: 01/28/22 0618     WBC 3 63 Thousand/uL      RBC 3 16 Million/uL      Hemoglobin 8 6 g/dL      Hematocrit 28 5 %      MCV 90 fL      MCH 27 2 pg      MCHC 30 2 g/dL      RDW 17 0 %      Platelets 5 Thousands/uL      nRBC 0 /100 WBCs      Neutrophils Relative 71 %      Immat GRANS % 1 %      Lymphocytes Relative 15 %      Monocytes Relative 13 %      Eosinophils Relative 0 %      Basophils Relative 0 %      Neutrophils Absolute 2 56 Thousands/µL      Immature Grans Absolute 0 03 Thousand/uL      Lymphocytes Absolute 0 55 Thousands/µL      Monocytes Absolute 0 47 Thousand/µL      Eosinophils Absolute 0 01 Thousand/µL      Basophils Absolute 0 01 Thousands/µL     Fingerstick Glucose (POCT) [[id number]]  (Normal) Collected: 01/27/22 1726    Lab Status: Final result Updated: 01/27/22 1727     POC Glucose 93 mg/dl Fingerstick Glucose (POCT) [[id number]]  (Abnormal) Collected: 01/27/22 1543    Lab Status: Final result Updated: 01/27/22 1544     POC Glucose 285 mg/dl     Fingerstick Glucose (POCT) [[id number]]  (Normal) Collected: 01/27/22 1348    Lab Status: Final result Updated: 01/27/22 1349     POC Glucose 100 mg/dl     LD,Blood [[id number]]  (Abnormal) Collected: 01/27/22 0439    Lab Status: Final result Specimen: Blood from Arm, Left Updated: 01/27/22 1311      U/L     Uric acid [[id number]]  (Abnormal) Collected: 01/27/22 0439    Lab Status: Final result Specimen: Blood from Arm, Left Updated: 01/27/22 1311     Uric Acid 15 7 mg/dL     Fingerstick Glucose (POCT) [[id number]]  (Normal) Collected: 01/27/22 1219    Lab Status: Final result Updated: 01/27/22 1220     POC Glucose 124 mg/dl     Vitamin D 25 hydroxy [[id number]]     Lab Status: No result Specimen: Blood     Immunoglobulin free LT chains blood [[id number]]     Lab Status: No result Specimen: Blood     Protein electrophoresis, serum [[id number]]     Lab Status: No result Specimen: Blood     Protein electrophoresis, urine [[id number]]     Lab Status: No result Specimen: Urine     Fingerstick Glucose (POCT) [[id number]]  (Abnormal) Collected: 01/27/22 1052    Lab Status: Final result Updated: 01/27/22 1053     POC Glucose 256 mg/dl     PTH, intact [[id number]]  (Abnormal) Collected: 01/27/22 0439    Lab Status: Final result Specimen: Blood from Arm, Left Updated: 01/27/22 0953     PTH 11 2 pg/mL     Fingerstick Glucose (POCT) [[id number]]  (Abnormal) Collected: 01/27/22 0903    Lab Status: Final result Updated: 01/27/22 0904     POC Glucose 50 mg/dl     Fingerstick Glucose (POCT) [[id number]]  (Normal) Collected: 01/27/22 0712    Lab Status: Final result Updated: 01/27/22 0713     POC Glucose 117 mg/dl     Fingerstick Glucose (POCT) [[id number]]  (Normal) Collected: 01/27/22 0641    Lab Status: Final result Updated: 01/27/22 0642     POC Glucose 137 mg/dl Fingerstick Glucose (POCT) [[id number]]  (Normal) Collected: 01/27/22 0610    Lab Status: Final result Updated: 01/27/22 0612     POC Glucose 84 mg/dl     Osmolality, urine [[id number]]  (Normal) Collected: 01/27/22 0439    Lab Status: Final result Specimen: Urine, Other Updated: 01/27/22 0604     Osmolality, Ur 536 mmol/KG     UA (URINE) with reflex to Scope [[id number]] Collected: 01/27/22 0440    Lab Status: Final result Specimen: Urine, Other Updated: 01/27/22 0600     Color, UA Yellow     Clarity, UA Clear     Specific Gravity, UA 1 025     pH, UA 5 0     Leukocytes, UA Negative     Nitrite, UA Negative     Protein, UA Negative mg/dl      Glucose, UA Negative mg/dl      Ketones, UA Negative mg/dl      Urobilinogen, UA 0 2 E U /dl      Bilirubin, UA Negative     Blood, UA Negative    HS Troponin I 4hr [[id number]]  (Abnormal) Collected: 01/27/22 0439    Lab Status: Final result Specimen: Blood from Arm, Left Updated: 01/27/22 0551     hs TnI 4hr 55 ng/L      Delta 4hr hsTnI 0 ng/L     CBC (With Platelets) [[id number]]  (Abnormal) Collected: 01/27/22 0439    Lab Status: Final result Specimen: Blood from Arm, Left Updated: 01/27/22 0543     WBC 3 32 Thousand/uL      RBC 3 22 Million/uL      Hemoglobin 8 8 g/dL      Hematocrit 28 0 %      MCV 87 fL      MCH 27 3 pg      MCHC 31 4 g/dL      RDW 16 5 %      Platelets 18 Thousands/uL      MPV 11 3 fL     Fingerstick Glucose (POCT) [[id number]]  (Abnormal) Collected: 01/27/22 0542    Lab Status: Final result Updated: 01/27/22 0543     POC Glucose 164 mg/dl     Sodium, urine, random [[id number]] Collected: 01/27/22 0439    Lab Status: Final result Specimen: Urine, Other Updated: 01/27/22 0542     Sodium, Ur 20    Creatinine, urine, random [[id number]] Collected: 01/27/22 0439    Lab Status: Final result Specimen: Urine, Other Updated: 01/27/22 0542     Creatinine, Ur 109 0 mg/dL     Protein / creatinine ratio, urine [[id number]]  (Abnormal) Collected: 01/27/22 0439    Lab Status: Final result Specimen: Urine, Other Updated: 01/27/22 0542     Creatinine, Ur 109 0 mg/dL      Protein Urine Random 35 mg/dL      Prot/Creat Ratio, Ur 2 59    Basic metabolic panel [[id number]]  (Abnormal) Collected: 01/27/22 0439    Lab Status: Final result Specimen: Blood from Arm, Left Updated: 01/27/22 0529     Sodium 139 mmol/L      Potassium 3 4 mmol/L      Chloride 104 mmol/L      CO2 25 mmol/L      ANION GAP 10 mmol/L      BUN 88 mg/dL      Creatinine 2 88 mg/dL      Glucose 26 mg/dL      Calcium 12 7 mg/dL      eGFR 13 ml/min/1 73sq m     Narrative:      [place] guidelines for Chronic Kidney Disease (CKD):     Stage 1 with normal or high GFR (GFR > 90 mL/min/1 73 square meters)    Stage 2 Mild CKD (GFR = 60-89 mL/min/1 73 square meters)    Stage 3A Moderate CKD (GFR = 45-59 mL/min/1 73 square meters)    Stage 3B Moderate CKD (GFR = 30-44 mL/min/1 73 square meters)    Stage 4 Severe CKD (GFR = 15-29 mL/min/1 73 square meters)    Stage 5 End Stage CKD (GFR <15 mL/min/1 73 square meters)  Note: GFR calculation is accurate only with a steady state creatinine    Protime-INR [[id number]]  (Abnormal) Collected: 01/27/22 0439    Lab Status: Final result Specimen: Blood from Arm, Left Updated: 01/27/22 0529     Protime 15 3 seconds      INR 1 27    Magnesium [[id number]]  (Normal) Collected: 01/27/22 0439    Lab Status: Final result Specimen: Blood from Arm, Left Updated: 01/27/22 0522     Magnesium 1 9 mg/dL     Phosphorus [[id number]]  (Normal) Collected: 01/27/22 0439    Lab Status: Final result Specimen: Blood from Arm, Left Updated: 01/27/22 0522     Phosphorus 2 4 mg/dL     Hepatic function panel [[id number]]  (Abnormal) Collected: 01/27/22 0439    Lab Status: Final result Specimen: Blood from Arm, Left Updated: 01/27/22 0522     Total Bilirubin 0 99 mg/dL      Bilirubin, Direct 0 66 mg/dL      Alkaline Phosphatase 467 U/L       U/L       U/L      Total Protein 5 7 g/dL      Albumin 2 5 g/dL     PTH-related peptide [[id number]] Collected: 01/27/22 0439    Lab Status: In process Specimen: Blood from Arm, Left Updated: 01/27/22 0448    HS Troponin I 2hr [[id number]]  (Normal) Collected: 01/27/22 0234    Lab Status: Final result Specimen: Blood from Arm, Left Updated: 01/27/22 0318     hs TnI 2hr 45 ng/L      Delta 2hr hsTnI -10 ng/L     HS Troponin 0hr (reflex protocol) [[id number]]  (Abnormal) Collected: 01/27/22 0026    Lab Status: Final result Specimen: Blood from Arm, Right Updated: 01/27/22 0118     hs TnI 0hr 55 ng/L     TSH, 3rd generation [[id number]]  (Normal) Collected: 01/27/22 0026    Lab Status: Final result Specimen: Blood from Arm, Right Updated: 01/27/22 0106     TSH 3RD GENERATON 0 500 uIU/mL     Narrative:      Patients undergoing fluorescein dye angiography may retain small amounts of fluorescein in the body for 48-72 hours post procedure  Samples containing fluorescein can produce falsely depressed TSH values  If the patient had this procedure,a specimen should be resubmitted post fluorescein clearance        Fingerstick Glucose (POCT) [[id number]]  (Normal) Collected: 01/27/22 0023    Lab Status: Final result Updated: 01/27/22 0025     POC Glucose 102 mg/dl     Fingerstick Glucose (POCT) [[id number]]  (Abnormal) Collected: 01/26/22 2118    Lab Status: Final result Updated: 01/26/22 2119     POC Glucose 161 mg/dl     CBC and differential [[id number]]  (Abnormal) Collected: 01/26/22 1943    Lab Status: Final result Specimen: Blood from Arm, Left Updated: 01/26/22 2045     WBC 5 27 Thousand/uL      RBC 3 70 Million/uL      Hemoglobin 10 2 g/dL      Hematocrit 31 7 %      MCV 86 fL      MCH 27 6 pg      MCHC 32 2 g/dL      RDW 16 0 %      Platelets 19 Thousands/uL      nRBC 0 /100 WBCs      Neutrophils Relative 79 %      Immat GRANS % 1 %      Lymphocytes Relative 12 %      Monocytes Relative 8 %      Eosinophils Relative 0 %      Basophils Relative 0 % Neutrophils Absolute 4 19 Thousands/µL      Immature Grans Absolute 0 04 Thousand/uL      Lymphocytes Absolute 0 62 Thousands/µL      Monocytes Absolute 0 40 Thousand/µL      Eosinophils Absolute 0 00 Thousand/µL      Basophils Absolute 0 02 Thousands/µL     Comprehensive metabolic panel [[id number]]  (Abnormal) Collected: 01/26/22 1944    Lab Status: Final result Specimen: Blood from Arm, Left Updated: 01/26/22 2033     Sodium 136 mmol/L      Potassium 3 5 mmol/L      Chloride 103 mmol/L      CO2 25 mmol/L      ANION GAP 8 mmol/L      BUN 88 mg/dL      Creatinine 2 94 mg/dL      Glucose 21 mg/dL      Calcium 12 8 mg/dL      Corrected Calcium 13 9 mg/dL       U/L       U/L      Alkaline Phosphatase 543 U/L      Total Protein 6 3 g/dL      Albumin 2 6 g/dL      Total Bilirubin 1 16 mg/dL      eGFR 13 ml/min/1 73sq m     Narrative:      [place] guidelines for Chronic Kidney Disease (CKD):     Stage 1 with normal or high GFR (GFR > 90 mL/min/1 73 square meters)    Stage 2 Mild CKD (GFR = 60-89 mL/min/1 73 square meters)    Stage 3A Moderate CKD (GFR = 45-59 mL/min/1 73 square meters)    Stage 3B Moderate CKD (GFR = 30-44 mL/min/1 73 square meters)    Stage 4 Severe CKD (GFR = 15-29 mL/min/1 73 square meters)    Stage 5 End Stage CKD (GFR <15 mL/min/1 73 square meters)  Note: GFR calculation is accurate only with a steady state creatinine    CK (with reflex to MB) [[id number]]  (Normal) Collected: 01/26/22 1944    Lab Status: Final result Specimen: Blood from Arm, Left Updated: 01/26/22 2023     Total CK 30 U/L                  CT head without contrast   Final Result by Jorge Brito MD (01/26 2044)      No acute intracranial abnormality  No interval change  Workstation performed: [id number]         CT spine cervical without contrast   Final Result by Jorge Brito MD (01/26 2051)      No cervical spine fracture or traumatic malalignment  Workstation performed: [id number]         CT chest abdomen pelvis wo contrast   Final Result by Debria Hatchet, MD (01/26 2105)      Status post right lower lobectomy with minimal dependent atelectasis at the right lung base and trace right pleural effusion  Interval development of splenomegaly and extensive retroperitoneal/left inguinal adenopathy as described  Findings suspicious for lymphoma recurrence  Left hydronephrosis due to the extensive retroperitoneal adenopathy  Thickening of the left bladder wall adjacent to the conglomerate adenopathy  Direct invasion of the bladder cannot be excluded on this unenhanced exam                         Workstation performed: [id number]               Procedures  ECG 12 Lead Documentation Only    Date/Time: 1/27/2022 2:27 AM  Performed by: Romie Singer MD  Authorized by: Romie Singer MD     Indications / Diagnosis:  Weakness  ECG reviewed by me, the ED Provider: yes    Patient location:  ED  Previous ECG:     Previous ECG:  Compared to current    Similarity:  Changes noted    Comparison to cardiac monitor: Yes    Interpretation:     Interpretation: abnormal    Rate:     ECG rate:  111    ECG rate assessment: tachycardic    Rhythm:     Rhythm: sinus rhythm    Ectopy:     Ectopy: none    QRS:     QRS axis:  Left    QRS intervals:  Normal  Conduction:     Conduction: abnormal      Abnormal conduction: non-specific intraventricular conduction delay    ST segments:     ST segments:  Normal  T waves:     T waves: normal            ED Course                             SBIRT 22yo+      Most Recent Value   SBIRT (22 yo +)    In order to provide better care to our patients, we are screening all of our patients for alcohol and drug use  Would it be okay to ask you these screening questions?  Unable to answer at this time Filed at: 01/26/2022 1745                MDM     81 yo F with past medical hx of B cell s/p treatment, diabetes and hyperlipidemia who presents for evaluation of generalized weakness for the past two weeks  OP labs shows LAURA, hypercalcemia, thrombocytopenia  Concern for recurrent malignancy  Will check labs including CBC, CMP, troponin, EKG and CT head- c-spine and c/a/p to look for possible compression fracture or malignancy  Will give 1L bolus IVF for hypercalcemiia  Reviewed labs, glucose 21, given one amp dextrose, will start on D5NS infusion  Will check frequent finger sticks  Platelets 45F, will hold on transfusion at this time as patient does not have active bleeding and platelets above 87P  Reviewed CT scan, shows likely recurrence of lymphoma  Discussed with patient, will admit to [facility]  SL requesting patient be transfused platelets, will obtain consent  Disposition  Final diagnoses: Thrombocytopenia (HCC)   Weakness   Hypercalcemia   Hypoglycemia   Splenomegaly   Elevated LFTs   LAURA (acute kidney injury) ([facility] )     Time reflects when diagnosis was documented in both MDM as applicable and the Disposition within this note     Time User Action Codes Description Comment    1/26/2022  9:41 PM Margi Beavers Add [D69 6] Thrombocytopenia ([facility] )     1/26/2022  9:41 PM Margi Beavers Add [R53 1] Weakness     1/26/2022  9:41 PM Margi Beavers Add [E83 52] Hypercalcemia     1/26/2022  9:42 PM Margi Beavers Add [E16 2] Hypoglycemia     1/26/2022  9:42 PM Margi Beavers Add [R16 1] Splenomegaly     1/26/2022  9:43 PM Radha Fend [R79 89] Elevated LFTs     1/26/2022  9:43 PM Margi Beavers Add [N17 9] LAURA (acute kidney injury) ([facility] )     1/27/2022 12:13 PM Tommie Meraz Add [C83 30] Diffuse large B-cell lymphoma, unspecified body region [facility])       ED Disposition     ED Disposition Condition Date/Time Comment    Admit Stable Wed Jan 26, 2022  9:41 PM Case was discussed with [facility] and the patient's admission status was agreed to be Admission Status: inpatient status to the service of Dr Rich Robles          Follow-up Information    None Current Discharge Medication List      CONTINUE these medications which have NOT CHANGED    Details   acetaminophen (TYLENOL) 325 mg tablet Take 2 tablets (650 mg total) by mouth every 6 (six) hours as needed for mild pain, headaches or fever  Qty: 30 tablet, Refills: 0      amLODIPine (NORVASC) 5 mg tablet TAKE 1 TABLET DAILY  Qty: 90 tablet, Refills: 1    Associated Diagnoses: Benign essential HTN      aspirin (ECOTRIN LOW STRENGTH) 81 mg EC tablet Take 1 tablet (81 mg total) by mouth daily    Associated Diagnoses: 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 ([facility])      atorvastatin (LIPITOR) 20 mg tablet Take 1 tablet (20 mg total) by mouth daily  Qty: 90 tablet, Refills: 3    Associated Diagnoses: 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 (HCC)      Azopt 1 % ophthalmic suspension 3 (three) times a day       brimonidine (Alphagan P) 0 1 % Alphagan P 0 1 % eye drops   ADMINISTER 1 DROP OPHTHALMICALLY IN THE RIGHT EYE THREE TIMES A DAY  90 DAY SUPPLY      cholecalciferol (VITAMIN D3) 1,000 units tablet Take 2,000 Units by mouth daily      Cyanocobalamin (VITAMIN B-12 PO) Take 1 tablet by mouth  hydrochlorothiazide (HYDRODIURIL) 12 5 mg tablet TAKE 1 TABLET DAILY  Qty: 90 tablet, Refills: 3    Associated Diagnoses: Essential hypertension      insulin glargine (Basaglar KwikPen) 100 units/mL injection pen Inject 34 Units under the skin daily  Qty: 32 mL, Refills: 1    Comments: Dx E11 9, R80 9, NPI [id number]  Associated Diagnoses: Diabetes mellitus with microalbuminuria ([facility])      Insulin Pen Needle (BD ULTRA-FINE PEN NEEDLES) 29G X 12 7MM MISC by Does not apply route 2 (two) times a day  Qty: 180 each, Refills: 1    Comments: Fax to [address] [facility] [place]  Associated Diagnoses: Diabetes mellitus with microalbuminuria ([address] );  Uncontrolled type 2 diabetes mellitus with microalbuminuria, without long-term current use of insulin ([facility])      Lumigan 0 01 % ophthalmic drops Multiple Vitamin (MULTIVITAMIN) tablet Take 1 tablet by mouth daily  omeprazole (PriLOSEC) 20 mg delayed release capsule Take 20 mg by mouth daily  No discharge procedures on file  PDMP Review     None           ED Provider  Attending physically available and evaluated Sandra Martiza STROUD managed the patient along with the ED Attending      Electronically Signed by         Mandi Snowden MD  01/28/22 [id number]       Mandi Snowden MD  01/28/22 0349

## 2022-01-26 NOTE — TELEPHONE ENCOUNTER
Lab Result: Calcium 13 1 / Corrected Calcium 14 1    Date/Time Drawn: 1/26/22 07:06    Ordering Provider: Dr Sosa Drafts Name: Sanna Miranda        The following critical/stat result was read back to the lab as stated above and [employer] messaged to the on-call provider

## 2022-01-26 NOTE — TELEPHONE ENCOUNTER
The lab called about a critical results  Patient's platelets went from 206,173 to 22,000  Lab reports there was no clumping or clotting in the sample

## 2022-01-27 PROBLEM — E87.6 HYPOKALEMIA: Status: ACTIVE | Noted: 2022-01-01

## 2022-01-27 NOTE — ASSESSMENT & PLAN NOTE
· Calcium on admission: 12 8 (corrects to 13 9)  · PTH elevated @ 11 2  · S/p pamidronate 60 mg IV x1 dose  · Renal following and appreciate their recommendations

## 2022-01-27 NOTE — ASSESSMENT & PLAN NOTE
Lab Results   Component Value Date    HGBA1C 6 4 (H) 01/26/2022     Recent Labs     01/27/22  0903 01/27/22  1052 01/27/22  1219 01/27/22  1348   POCGLU 50* 256* 124 100     Blood Sugar Average: Last 72 hrs:  (P) 129 5   · Decreased oral intake causing hypoglycemia  · Hold glargine at this time

## 2022-01-27 NOTE — ED NOTES
fely and Masoud Ontiveros requested x2 from pharmacy @ this time       Leroy Chavez RN  01/27/22 [id number]

## 2022-01-27 NOTE — CONSULTS
Medical Oncology/Hematology Consult Note  Kristin Shah, female, 80 y  o , 11/6/1931,  ED 19/ED 19, [id number]     Assessment and Plan    1  Significant lymphadenopathy  2  Splenomegaly   3  Pancytopenia  4  Hypercalcemia  4  History of DLBCL  - Pt with history of DLBCL, diagnosed in 2007; received 8 cycles of R-CHOP at that time  - Pt presented with weakness, poor appetite for several weeks  - CT CAP shows splenomegaly and extensive retroperitoneal/left inguinal adenopathy as described   Findings suspicious for recurrence of her DLBCL  - check uric acid and LDH daily   - start prednisone 50mg daily x 5 days - hopefully this will help control her lymphoma, and improve her pancytopenia  - pamidronate 60mg IV x 1 dose  - reviewed with patient and son at bedside, that patient likely has recurrence of her lymphoma; unfortunately, other than a short course of steroids, there are no treatment options for her given her age and comorbidities; we discussed the possibility of a biopsy to confirm this diagnosis, however, it is not needed; we reviewed that either way, no treatment options exist   recommend supportive cares  - palliative care consult to assist with GOC and to discuss hospice   - pancytopenia likely from bone marrow involvement of her lymphoma  Would prefer to avoid blood/platelet transfusions given blood shortage; would advise waiting to see if steroids help  5  Tumor lysis syndrome  6  LAURA on CKD  - Uric acid 15 7, ; this is contributing to her LAURA on CKD  - rasburicase 3mg now x 1; repeat tomorrow if uric acid greater than 6  - allopurinol 100mg Qdaily        Outpatient follow up plan: not clinically indicated given above     Communication with patient/family:  I did update the patient, as well as her     Communication with team:  Did communicate with Dr Heladio Skinner, primary team  I also spoke with Wen Fernando, with palliative care        I did see this patient with Dr Semaj Cote and he is in agreement with this plan  Morales August [facility]  Hematology/Oncology Nurse Practitioner     Reason for consultation: concern for recurrence of lymphoma    History of present illness:  Esteban Geronimo is a 80 y o  female presents to the ER with generalized weakness, fatigue, cough, and decreased appetite for several weeks  She denies any fevers, chills, night sweats or abdominal pain  She did have labs checked and abnormalities were noted, and she was told to go to the ER  She did have a CT of chest and pelvis that shows new significant splenomegaly as well as retroperitoneal and inguinal adenopathy, concerning for recurrence of her diffuse large B-cell lymphoma  Additionally she has left hydronephrosis and LAURA, as well as bilateral lower extremity edema, which is likely more pronounced due to her adenopathy  She also has hypercalcemia  She has been weak for quite some time, and is unable to walk currently  She usually lives at home with her son  Review of Systems:   Review of Systems   Constitutional: Positive for activity change, appetite change, fatigue and unexpected weight change  Negative for chills and fever  HENT: Negative for congestion, mouth sores, nosebleeds and sore throat  Eyes: Negative for visual disturbance  Respiratory: Negative for cough and shortness of breath  Cardiovascular: Negative for chest pain  Gastrointestinal: Positive for abdominal distention  Negative for abdominal pain  Genitourinary: Negative for difficulty urinating and dysuria  Musculoskeletal:        Weakness in LE legs   Skin: Positive for rash and wound (chronic stasis wounds bilaterally, worse on left)  Neurological: Positive for weakness  Negative for dizziness and light-headedness  Hematological: Negative for adenopathy  Bruises/bleeds easily  Psychiatric/Behavioral: Negative for agitation and confusion           Oncology History:   Cancer Staging  No matching staging information was found for the patient  Oncology History    No history exists  Past Medical History:   Diagnosis Date    Anemia     [facility]     [facility]     Diabetes mellitus [facility]     GERD (gastroesophageal reflux disease)     GERD without esophagitis     Hyperlipidemia     last assessed 13    Hypertension     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[facility]     last assessed  13    [facility]     Lung cancer ([facility]     Squamous Cell Lung CA -Right     Lymphoma [facility]     Muscle strain of left lower leg 2021    Non Hodgkin's lymphoma ([facility]     last assessed 14    Osteoporosis     last assessed  13    Right cataract     last assessed  3/2/15    Shingles        Past Surgical History:   Procedure Laterality Date    CATARACT EXTRACTION      EYE SURGERY      LIVER LOBECTOMY      LUNG REMOVAL, PARTIAL Right     lobectomy - onset approx 3/12/12 - right lower lobe       Family History   Problem Relation Age of Onset    Brain cancer Mother     Other Mother         adenocarcinoma of the accessor sinuses    Cancer Mother     Diabetes Father     Leukemia Father     Cirrhosis Sister         Alcoholic's cirrhosis - Laenn's       Social History     Socioeconomic History    Marital status:       Spouse name: None    Number of children: None    Years of education: None    Highest education level: None   Occupational History    Occupation: retired     Comment:    Tobacco Use    Smoking status: Former Smoker     Packs/day: 2 00     Years: 40 00     Pack years: 80 00     Start date: 1951     Quit date:      Years since quittin 0    Smokeless tobacco: Never Used    Tobacco comment: 60 pack year hx, Quit 15 yrs ago  as per [employer]   Substance and Sexual Activity    Alcohol use: No    Drug use: No    Sexual activity: Not Currently     Partners: Female   Other Topics Concern    None   Social History Narrative    None     Social Determinants of Health     Financial Resource Strain: Not on file   Food Insecurity: Not on file   Transportation Needs: Not on file   Physical Activity: Not on file   Stress: Not on file   Social Connections: Not on file   Intimate Partner Violence: Not on file   Housing Stability: Not on file         Current Facility-Administered Medications:     acetaminophen (TYLENOL) tablet 650 mg, 650 mg, Oral, Q6H PRN, MD Sandra Shook  Kirstin Alatorre ON 1/28/2022] amLODIPine (NORVASC) tablet 5 mg, 5 mg, Oral, Daily, Yani Thibodeaux PA-C    brimonidine tartrate 0 2 % ophthalmic solution 1 drop, 1 drop, Right Eye, TID, Raquel Salazar MD, 1 drop at 01/27/22 1052    cyanocobalamin (VITAMIN B-12) tablet 500 mcg, 500 mcg, Oral, Daily, Raquel Salazar MD    dextrose 5 % and sodium chloride 0 9 % infusion, 100 mL/hr, Intravenous, Continuous, Yani Thibodeaux PA-C, Last Rate: 125 mL/hr at 01/27/22 0530, 125 mL/hr at 01/27/22 0530    docusate sodium (COLACE) capsule 100 mg, 100 mg, Oral, BID, Raquel Salazar MD    insulin lispro (HumaLOG) 100 units/mL subcutaneous injection 1-5 Units, 1-5 Units, Subcutaneous, TID AC **AND** Fingerstick Glucose (POCT), , , TID AC, Raquel Salazar MD    insulin lispro (HumaLOG) 100 units/mL subcutaneous injection 1-5 Units, 1-5 Units, Subcutaneous, HS, Raquel Salazar MD    ondansetron (ZOFRAN) injection 4 mg, 4 mg, Intravenous, Q6H PRN, Raquel Salazar MD    pantoprazole (PROTONIX) EC tablet 20 mg, 20 mg, Oral, Early Morning, Raquel Salazar MD    potassium chloride (K-DUR,KLOR-CON) CR tablet 40 mEq, 40 mEq, Oral, Once, Yani Thibodeaux PA-C    Current Outpatient Medications:     acetaminophen (TYLENOL) 325 mg tablet, Take 2 tablets (650 mg total) by mouth every 6 (six) hours as needed for mild pain, headaches or fever  , Disp: 30 tablet, Rfl: 0    amLODIPine (NORVASC) 5 mg tablet, TAKE 1 TABLET DAILY, Disp: 90 tablet, Rfl: 1    aspirin (ECOTRIN LOW STRENGTH) 81 mg EC tablet, Take 1 tablet (81 mg total) by mouth daily, Disp: , Rfl:     atorvastatin (LIPITOR) 20 mg tablet, Take 1 tablet (20 mg total) by mouth daily, Disp: 90 tablet, Rfl: 3    Azopt 1 % ophthalmic suspension, 3 (three) times a day , Disp: , Rfl:     brimonidine (Alphagan P) 0 1 %, Alphagan P 0 1 % eye drops  ADMINISTER 1 DROP OPHTHALMICALLY IN THE RIGHT EYE THREE TIMES A DAY  80 DAY SUPPLY, Disp: , Rfl:     cholecalciferol (VITAMIN D3) 1,000 units tablet, Take 2,000 Units by mouth daily, Disp: , Rfl:     Cyanocobalamin (VITAMIN B-12 PO), Take 1 tablet by mouth , Disp: , Rfl:     hydrochlorothiazide (HYDRODIURIL) 12 5 mg tablet, TAKE 1 TABLET DAILY, Disp: 90 tablet, Rfl: 3    insulin glargine (Basaglar KwikPen) 100 units/mL injection pen, Inject 34 Units under the skin daily, Disp: 32 mL, Rfl: 1    Insulin Pen Needle (AgeneBio ULTRA-FINE PEN NEEDLES) 29G X 12 7MM MISC, by Does not apply route 2 (two) times a day, Disp: 180 each, Rfl: 1    Lumigan 0 01 % ophthalmic drops, , Disp: , Rfl:     Multiple Vitamin (MULTIVITAMIN) tablet, Take 1 tablet by mouth daily  , Disp: , Rfl:     omeprazole (PriLOSEC) 20 mg delayed release capsule, Take 20 mg by mouth daily  , Disp: , Rfl:     (Not in a hospital admission)      Allergies   Allergen Reactions    Eye Drops [Tetrahydrozoline] Eye Swelling     Patient states when she went to the eye doctor and received the eye dilation solution it irritated her eyes         Physical Exam:     /58   Pulse 84   Temp 99 3 °F (37 4 °C) (Oral)   Resp 18   SpO2 98%     Physical Exam  Constitutional:       Appearance: Normal appearance  She is ill-appearing  She is not toxic-appearing  Comments: elderly   HENT:      Head: Normocephalic and atraumatic  Nose: Nose normal       Mouth/Throat:      Comments: Missing teeth  Eyes:      Extraocular Movements: Extraocular movements intact  Conjunctiva/sclera: Conjunctivae normal       Pupils: Pupils are equal, round, and reactive to light  Cardiovascular:      Rate and Rhythm: Normal rate and regular rhythm  Heart sounds: Normal heart sounds  Pulmonary:      Effort: Pulmonary effort is normal  No respiratory distress  Breath sounds: Normal breath sounds  No wheezing, rhonchi or rales  Abdominal:      General: Bowel sounds are normal  There is distension  Tenderness: There is no abdominal tenderness  Comments: Splenomegaly, about 8 cm below CVA   Musculoskeletal:         General: Normal range of motion  Cervical back: Normal range of motion and neck supple  Right lower leg: Edema (+2) present  Left lower leg: Edema (+2) present  Skin:     General: Skin is warm and dry  Coloration: Skin is not jaundiced  Findings: Lesion present  No bruising or rash  Comments: Chronic stasis wounds, worse on left than right   Neurological:      General: No focal deficit present  Mental Status: She is alert and oriented to person, place, and time  Psychiatric:         Mood and Affect: Mood normal          Judgment: Judgment normal          Recent Results (from the past 48 hour(s))   Hemoglobin A1C    Collection Time: 01/26/22  7:06 AM   Result Value Ref Range    Hemoglobin A1C 6 4 (H) Normal 3 8-5 6%; PreDiabetic 5 7-6 4%;  Diabetic >=6 5%; Glycemic control for adults with diabetes <7 0% %     mg/dl   Comprehensive metabolic panel    Collection Time: 01/26/22  7:06 AM   Result Value Ref Range    Sodium 135 (L) 136 - 145 mmol/L    Potassium 4 0 3 5 - 5 3 mmol/L    Chloride 100 100 - 108 mmol/L    CO2 23 21 - 32 mmol/L    ANION GAP 12 4 - 13 mmol/L    BUN 91 (H) 5 - 25 mg/dL    Creatinine 3 33 (H) 0 60 - 1 30 mg/dL    Glucose 66 65 - 140 mg/dL    Calcium 13 1 (HH) 8 3 - 10 1 mg/dL    Corrected Calcium 14 1 (HH) 8 3 - 10 1 mg/dL     (H) 5 - 45 U/L     (H) 12 - 78 U/L    Alkaline Phosphatase 464 (H) 46 - 116 U/L    Total Protein 6 4 6 4 - 8 2 g/dL    Albumin 2 8 (L) 3 5 - 5 0 g/dL    Total Bilirubin 1 40 (H) 0 20 - 1 00 mg/dL    eGFR 11 ml/min/1 73sq m   TSH, 3rd generation with Free T4 reflex    Collection Time: 01/26/22  7:06 AM   Result Value Ref Range    TSH 3RD GENERATON 0 529 0 358 - 3 740 uIU/mL   CBC    Collection Time: 01/26/22  7:06 AM   Result Value Ref Range    WBC 4 59 4 31 - 10 16 Thousand/uL    RBC 3 98 3 81 - 5 12 Million/uL    Hemoglobin 11 0 (L) 11 5 - 15 4 g/dL    Hematocrit 35 3 34 8 - 46 1 %    MCV 89 82 - 98 fL    MCH 27 6 26 8 - 34 3 pg    MCHC 31 2 (L) 31 4 - 37 4 g/dL    RDW 16 3 (H) 11 6 - 15 1 %    Platelets 22 (LL) 266 - 390 Thousands/uL   PTH, intact    Collection Time: 01/26/22  7:06 AM   Result Value Ref Range    PTH 10 5 (L) 18 4 - 80 1 pg/mL   UA w Reflex to Microscopic w Reflex to Culture    Collection Time: 01/26/22 12:37 PM    Specimen: Urine, Clean Catch   Result Value Ref Range    Color, UA Elvia     Clarity, UA Turbid     Specific [place], UA 1 020 1 003 - 1 030    pH, UA 6 0 4 5, 5 0, 5 5, 6 0, 6 5, 7 0, 7 5, 8 0    Leukocytes, UA Small (A) Negative    Nitrite, UA Negative Negative    Protein, UA Negative Negative mg/dl    Glucose, UA Negative Negative mg/dl    Ketones, UA Negative Negative mg/dl    Urobilinogen, UA 1 0 0 2, 1 0 E U /dl E U /dl    Bilirubin, UA Negative Negative    Blood, UA Moderate    Urine Microscopic    Collection Time: 01/26/22 12:37 PM   Result Value Ref Range    RBC, UA 4-10 (A) None Seen, 2-4 /hpf    WBC, UA 4-10 (A) None Seen, 2-4, 5-60 /hpf    Epithelial Cells Occasional None Seen, Occasional /hpf    Bacteria, UA Moderate (A) None Seen, Occasional /hpf    AMORPH URATES Moderate /hpf   CBC and differential    Collection Time: 01/26/22  7:43 PM   Result Value Ref Range    WBC 5 27 4 31 - 10 16 Thousand/uL    RBC 3 70 (L) 3 81 - 5 12 Million/uL    Hemoglobin 10 2 (L) 11 5 - 15 4 g/dL    Hematocrit 31 7 (L) 34 8 - 46 1 %    MCV 86 82 - 98 fL    MCH 27 6 26 8 - 34 3 pg    MCHC 32 2 31 4 - 37 4 g/dL    RDW 16 0 (H) 11 6 - 15 1 %    Platelets 19 (LL) 149 - 390 Thousands/uL    nRBC 0 /100 WBCs    Neutrophils Relative 79 (H) 43 - 75 %    Immat GRANS % 1 0 - 2 %    Lymphocytes Relative 12 (L) 14 - 44 %    Monocytes Relative 8 4 - 12 %    Eosinophils Relative 0 0 - 6 %    Basophils Relative 0 0 - 1 %    Neutrophils Absolute 4 19 1 85 - 7 62 Thousands/µL    Immature Grans Absolute 0 04 0 00 - 0 20 Thousand/uL    Lymphocytes Absolute 0 62 0 60 - 4 47 Thousands/µL    Monocytes Absolute 0 40 0 17 - 1 22 Thousand/µL    Eosinophils Absolute 0 00 0 00 - 0 61 Thousand/µL    Basophils Absolute 0 02 0 00 - 0 10 Thousands/µL   Type and screen    Collection Time: 01/26/22  7:43 PM   Result Value Ref Range    ABO Grouping B     Rh Factor Negative     Antibody Screen Negative     Specimen Expiration Date [id number]    Comprehensive metabolic panel    Collection Time: 01/26/22  7:44 PM   Result Value Ref Range    Sodium 136 136 - 145 mmol/L    Potassium 3 5 3 5 - 5 3 mmol/L    Chloride 103 100 - 108 mmol/L    CO2 25 21 - 32 mmol/L    ANION GAP 8 4 - 13 mmol/L    BUN 88 (H) 5 - 25 mg/dL    Creatinine 2 94 (H) 0 60 - 1 30 mg/dL    Glucose 21 (LL) 65 - 140 mg/dL    Calcium 12 8 (HH) 8 3 - 10 1 mg/dL    Corrected Calcium 13 9 (HH) 8 3 - 10 1 mg/dL     (H) 5 - 45 U/L     (H) 12 - 78 U/L    Alkaline Phosphatase 543 (H) 46 - 116 U/L    Total Protein 6 3 (L) 6 4 - 8 2 g/dL    Albumin 2 6 (L) 3 5 - 5 0 g/dL    Total Bilirubin 1 16 (H) 0 20 - 1 00 mg/dL    eGFR 13 ml/min/1 73sq m   CK (with reflex to MB)    Collection Time: 01/26/22  7:44 PM   Result Value Ref Range    Total CK 30 26 - 192 U/L   Fingerstick Glucose (POCT)    Collection Time: 01/26/22  9:18 PM   Result Value Ref Range    POC Glucose 161 (H) 65 - 140 mg/dl   Fingerstick Glucose (POCT)    Collection Time: 01/27/22 12:23 AM   Result Value Ref Range    POC Glucose 102 65 - 140 mg/dl   TSH, 3rd generation    Collection Time: 01/27/22 12:26 AM   Result Value Ref Range    TSH 3RD GENERATON 0 500 0 358 - 3 740 uIU/mL   HS Troponin 0hr (reflex protocol)    Collection Time: 01/27/22 12:26 AM   Result Value Ref Range    hs TnI 0hr 55 (H) "Refer to ACS Flowchart"- see link ng/L   HS Troponin I 2hr    Collection Time: 01/27/22  2:34 AM   Result Value Ref Range    hs TnI 2hr 45 "Refer to ACS Flowchart"- see link ng/L    Delta 2hr hsTnI -10 <20 ng/L   Osmolality, urine    Collection Time: 01/27/22  4:39 AM   Result Value Ref Range    Osmolality, Ur 536 250 - 900 mmol/KG   Sodium, urine, random    Collection Time: 01/27/22  4:39 AM   Result Value Ref Range    Sodium, Ur 20    Creatinine, urine, random    Collection Time: 01/27/22  4:39 AM   Result Value Ref Range    Creatinine, Ur 109 0 mg/dL   Protein / creatinine ratio, urine    Collection Time: 01/27/22  4:39 AM   Result Value Ref Range    Creatinine, Ur 109 0 mg/dL    Protein Urine Random 35 mg/dL    Prot/Creat Ratio, Ur 0 32 (H) 0 00 - 0 10   HS Troponin I 4hr    Collection Time: 01/27/22  4:39 AM   Result Value Ref Range    hs TnI 4hr 55 (H) "Refer to ACS Flowchart"- see link ng/L    Delta 4hr hsTnI 0 <20 ng/L   Basic metabolic panel    Collection Time: 01/27/22  4:39 AM   Result Value Ref Range    Sodium 139 136 - 145 mmol/L    Potassium 3 4 (L) 3 5 - 5 3 mmol/L    Chloride 104 100 - 108 mmol/L    CO2 25 21 - 32 mmol/L    ANION GAP 10 4 - 13 mmol/L    BUN 88 (H) 5 - 25 mg/dL    Creatinine 2 88 (H) 0 60 - 1 30 mg/dL    Glucose 26 (LL) 65 - 140 mg/dL    Calcium 12 7 (HH) 8 3 - 10 1 mg/dL    eGFR 13 ml/min/1 73sq m   Magnesium    Collection Time: 01/27/22  4:39 AM   Result Value Ref Range    Magnesium 1 9 1 6 - 2 6 mg/dL   Phosphorus    Collection Time: 01/27/22  4:39 AM   Result Value Ref Range    Phosphorus 2 4 2 3 - 4 1 mg/dL   CBC (With Platelets)    Collection Time: 01/27/22  4:39 AM   Result Value Ref Range    WBC 3 32 (L) 4 31 - 10 16 Thousand/uL    RBC 3 22 (L) 3 81 - 5 12 Million/uL    Hemoglobin 8 8 (L) 11 5 - 15 4 g/dL    Hematocrit 28 0 (L) 34 8 - 46 1 %    MCV 87 82 - 98 fL    MCH 27 3 26 8 - 34 3 pg    MCHC 31 4 31 4 - 37 4 g/dL    RDW 16 5 (H) 11 6 - 15 1 %    Platelets 18 (LL) 291 - 390 Thousands/uL    MPV 11 3 8 9 - 12 7 fL   Hepatic function panel    Collection Time: 01/27/22  4:39 AM   Result Value Ref Range    Total Bilirubin 0 99 0 20 - 1 00 mg/dL    Bilirubin, Direct 0 66 (H) 0 00 - 0 20 mg/dL    Alkaline Phosphatase 467 (H) 46 - 116 U/L     (H) 5 - 45 U/L     (H) 12 - 78 U/L    Total Protein 5 7 (L) 6 4 - 8 2 g/dL    Albumin 2 5 (L) 3 5 - 5 0 g/dL   Protime-INR    Collection Time: 01/27/22  4:39 AM   Result Value Ref Range    Protime 15 3 (H) 11 6 - 14 5 seconds    INR 1 27 (H) 0 84 - 1 19   PTH, intact    Collection Time: 01/27/22  4:39 AM   Result Value Ref Range    PTH 11 2 (L) 18 4 - 80 1 pg/mL   LD,Blood    Collection Time: 01/27/22  4:39 AM   Result Value Ref Range     (H) 81 - 234 U/L   Uric acid    Collection Time: 01/27/22  4:39 AM   Result Value Ref Range    Uric Acid 15 7 (H) 2 0 - 6 8 mg/dL   UA (URINE) with reflex to Scope    Collection Time: 01/27/22  4:40 AM   Result Value Ref Range    Color, UA Yellow     Clarity, UA Clear     Specific Gravity, UA 1 025 1 003 - 1 030    pH, UA 5 0 4 5, 5 0, 5 5, 6 0, 6 5, 7 0, 7 5, 8 0    Leukocytes, UA Negative Negative    Nitrite, UA Negative Negative    Protein, UA Negative Negative mg/dl    Glucose, UA Negative Negative mg/dl    Ketones, UA Negative Negative mg/dl    Urobilinogen, UA 0 2 0 2, 1 0 E U /dl E U /dl    Bilirubin, UA Negative Negative    Blood, UA Negative    Fingerstick Glucose (POCT)    Collection Time: 01/27/22  5:42 AM   Result Value Ref Range    POC Glucose 164 (H) 65 - 140 mg/dl   Prepare Leukoreduced Platelet Pheresis (1 pheresis product = 6-8 pooled units): 1 Product    Collection Time: 01/27/22  5:55 AM   Result Value Ref Range    Unit Product Code L1375D17     Unit Number [id number]     Unit ABO B     Unit RH NEG     Unit Dispense Status Presumed Trans     Unit Product Volume 244 mL   Fingerstick Glucose (POCT)    Collection Time: 01/27/22  6:10 AM   Result Value Ref Range    POC Glucose 84 65 - 140 mg/dl   Fingerstick Glucose (POCT)    Collection Time: 01/27/22  6:41 AM   Result Value Ref Range    POC Glucose 137 65 - 140 mg/dl   Fingerstick Glucose (POCT)    Collection Time: 01/27/22  7:12 AM   Result Value Ref Range    POC Glucose 117 65 - 140 mg/dl   Fingerstick Glucose (POCT)    Collection Time: 01/27/22  9:03 AM   Result Value Ref Range    POC Glucose 50 (L) 65 - 140 mg/dl   Fingerstick Glucose (POCT)    Collection Time: 01/27/22 10:52 AM   Result Value Ref Range    POC Glucose 256 (H) 65 - 140 mg/dl   Fingerstick Glucose (POCT)    Collection Time: 01/27/22 12:19 PM   Result Value Ref Range    POC Glucose 124 65 - 140 mg/dl   Fingerstick Glucose (POCT)    Collection Time: 01/27/22  1:48 PM   Result Value Ref Range    POC Glucose 100 65 - 140 mg/dl       CT chest abdomen pelvis wo contrast    Result Date: 1/26/2022  Narrative: CT CHEST, ABDOMEN AND PELVIS WITHOUT IV CONTRAST INDICATION:   falls, weakness  History of lung cancer and lymphoma  COMPARISON:  CT chest 5/24/2021 and CT chest/abdomen/pelvis 9/9/2011  TECHNIQUE: CT examination of the chest, abdomen and pelvis was performed without intravenous contrast   Axial, sagittal, and coronal 2D reformatted images were created from the source data and submitted for interpretation  Radiation dose length product (DLP) for this visit:  765 73 mGy-cm   This examination, like all CT scans performed in the [facility], was performed utilizing techniques to minimize radiation dose exposure, including the use of iterative  reconstruction and automated exposure control  Enteric contrast was not administered  FINDINGS: CHEST LUNGS:  Status post right lower lobectomy  No focal nodule  Minor atelectasis dependently at the right base  PLEURA:  Trace right pleural effusion   HEART/GREAT VESSELS: Heart is unremarkable for patient's age  No thoracic aortic aneurysm  MEDIASTINUM AND CASSIE:  Unremarkable  CHEST WALL AND LOWER NECK:   Unremarkable  ABDOMEN LIVER/BILIARY TREE:  Unremarkable  GALLBLADDER:  There are gallstone(s) within the gallbladder, without pericholecystic inflammatory changes  SPLEEN:  Interval development of splenomegaly measuring 14 cm  PANCREAS:  Atrophied  ADRENAL GLANDS:  Unremarkable  KIDNEYS/URETERS:  Simple right renal cyst   Interval development of mild left hydronephrosis due to extensive retroperitoneal adenopathy  STOMACH AND BOWEL:  There is colonic diverticulosis without evidence of acute diverticulitis  APPENDIX:  No findings to suggest appendicitis  ABDOMINOPELVIC CAVITY:  No ascites  No pneumoperitoneum  Interval development of extensive pathological conglomerate matted adenopathy in the retroperitoneum for example surrounding the left common iliac artery there is a lymphatic mass measuring 8 0 x  5 3 cm  Adenopathy extends into the left iliac chain and into the left inguinal region where there is conglomerate clementine mass measuring 5 7 x 3 9 cm  VESSELS:  Atherosclerotic changes are present  No evidence of aneurysm  PELVIS REPRODUCTIVE ORGANS:  Unremarkable for patient's age  URINARY BLADDER:  Significant bladder wall thickening towards the left adjacent to the extensive lymphadenopathy  ABDOMINAL WALL/INGUINAL REGIONS:  Anasarca  OSSEOUS STRUCTURES:  No acute fracture or destructive osseous lesion  Chronic T11 compression deformity  Impression: Status post right lower lobectomy with minimal dependent atelectasis at the right lung base and trace right pleural effusion  Interval development of splenomegaly and extensive retroperitoneal/left inguinal adenopathy as described  Findings suspicious for lymphoma recurrence  Left hydronephrosis due to the extensive retroperitoneal adenopathy  Thickening of the left bladder wall adjacent to the conglomerate adenopathy    Direct invasion of the bladder cannot be excluded on this unenhanced exam  Workstation performed: [id number]     XR spine lumbar minimum 4 views non injury    Result Date: 1/27/2022  Narrative: LUMBAR SPINE INDICATION:   [id number]: Other symptoms and signs involving the musculoskeletal system  Left leg weakness  COMPARISON:  Lumbar spine series 9/14/2010 and additional priors VIEWS:  XR SPINE LUMBAR MINIMUM 4 VIEWS NON INJURY FINDINGS: There are 5 non rib bearing lumbar vertebral bodies  There is no evidence of acute fracture or destructive osseous lesion  Moderate to severe compression fracture at T11 has been seen on prior CT examinations  Mild curvature of the lumbar spine to the left similar to the prior exam  Moderate-to-severe degenerative changes in the lumbar spine  This has progressed from 2010  The pedicles appear intact  There are atherosclerotic calcifications  Soft tissues are otherwise unremarkable  Impression: No acute osseous abnormality  Degenerative changes as described  Chronic compression fracture at T11  Workstation performed: [id number]     CT head without contrast    Result Date: 1/26/2022  Narrative: CT BRAIN - WITHOUT CONTRAST INDICATION:   fall, low platelets  COMPARISON:  CT scan 1/24/2022 TECHNIQUE:  CT examination of the brain was performed  In addition to axial images, sagittal and coronal 2D reformatted images were created and submitted for interpretation  Radiation dose length product (DLP) for this visit:  859 58 mGy-cm   This examination, like all CT scans performed in the [facility], was performed utilizing techniques to minimize radiation dose exposure, including the use of iterative  reconstruction and automated exposure control  IMAGE QUALITY:  Diagnostic   FINDINGS: PARENCHYMA: Decreased attenuation is noted in periventricular and subcortical white matter demonstrating an appearance that is statistically most likely to represent moderate microangiopathic change; this appearance is similar when compared to most recent prior examination  No CT signs of acute infarction  No intracranial mass, mass effect or midline shift  No acute parenchymal hemorrhage  VENTRICLES AND EXTRA-AXIAL SPACES:  Ventricles and extra-axial CSF spaces are prominent commensurate with the degree of volume loss  No hydrocephalus  No acute extra-axial hemorrhage  VISUALIZED ORBITS AND PARANASAL SINUSES:  Sinuses are clear  Stable 3 mm metallic foreign body anterior to the left globe  CALVARIUM AND EXTRACRANIAL SOFT TISSUES:  Stable soft tissue nodule at the vertex of the scalp measuring 1 8 cm  Impression: No acute intracranial abnormality  No interval change  Workstation performed: [id number]     CT head wo contrast    Result Date: 1/24/2022  Narrative: CT BRAIN - WITHOUT CONTRAST INDICATION:   R29 [id number]: Other symptoms and signs involving the musculoskeletal system W19  XXXD: Unspecified fall, subsequent encounter  COMPARISON:  Multiple priors most recently 6/20/2004 TECHNIQUE:  CT examination of the brain was performed  In addition to axial images, sagittal and coronal 2D reformatted images were created and submitted for interpretation  Radiation dose length product (DLP) for this visit:  976 62 004 mGy-cm   This examination, like all CT scans performed in the [facility], was performed utilizing techniques to minimize radiation dose exposure, including the use of iterative reconstruction and automated exposure control  IMAGE QUALITY:  Diagnostic  FINDINGS: PARENCHYMA: Decreased attenuation is noted in periventricular and subcortical white matter demonstrating an appearance that is statistically most likely to represent moderate microangiopathic change  No CT signs of acute infarction  No intracranial mass, mass effect or midline shift  No acute parenchymal hemorrhage  VENTRICLES AND EXTRA-AXIAL SPACES:  Volume loss without hydrocephalus   VISUALIZED ORBITS AND PARANASAL SINUSES: Bilateral globes are aphakic  Punctate metallic density in the left anterior globe, suspicious for a retained foreign body CALVARIUM AND EXTRACRANIAL SOFT TISSUES:  1 cm soft tissue nodule at the scalp vertex     Impression: No acute intracranial abnormality  Punctate metallic density in the left anterior globe, suspicious for a retained foreign body, age indeterminate 1 0 cm soft tissue nodule the scalp vertex  Correlate with direct visual inspection  The study was marked in [facility] for immediate notification  Workstation performed: [id number]     CT spine cervical without contrast    Result Date: 1/26/2022  Narrative: CT CERVICAL SPINE - WITHOUT CONTRAST INDICATION:   falls, weakness  COMPARISON:  CT cervical spine 8/12/2013 TECHNIQUE:  CT examination of the cervical spine was performed without intravenous contrast   Contiguous axial images were obtained  Sagittal and coronal reconstructions were performed  Radiation dose length product (DLP) for this visit:  411 74 mGy-cm   This examination, like all CT scans performed in the [facility], was performed utilizing techniques to minimize radiation dose exposure, including the use of iterative  reconstruction and automated exposure control  IMAGE QUALITY:  Diagnostic  FINDINGS: ALIGNMENT:  Normal alignment of the cervical spine  No subluxation  VERTEBRAL BODIES:  No fracture  DEGENERATIVE CHANGES:  Stable moderate multilevel cervical degenerative changes are noted, most severe at C4-C5 and C5-C6  No critical central canal stenosis  PREVERTEBRAL AND PARASPINAL SOFT TISSUES:  Unremarkable  THORACIC INLET:  Normal      Impression: No cervical spine fracture or traumatic malalignment  Workstation performed: [id number]     Echo complete w/ contrast if indicated    Result Date: 1/7/2022  Narrative: Jaspreet Clarke  Left Ventricle: Left ventricular cavity size is normal  The left ventricular ejection fraction is 55%   Systolic function is normal  Wall motion is normal  Diastolic function is mildly abnormal, consistent with grade I (abnormal) relaxation    Aortic Valve: There is mild regurgitation    Mitral Valve: There is mild annular calcification  Labs and pertinent reports reviewed  This note has been generated by voice recognition software system  Therefore, there may be spelling, grammar, and or syntax errors  Please contact if questions arise

## 2022-01-27 NOTE — ASSESSMENT & PLAN NOTE
· Hold statin due to elevated LFTs  · Seen by GI  No further work up at this time for elevated LFTs

## 2022-01-27 NOTE — ASSESSMENT & PLAN NOTE
· Suspect secondary to lymphoma recurrence  · Discontinue aspirin  · Oncology consulted and appreciate their recommendations  Avoid transfusion given blood shortage and no evidence of bleeding

## 2022-01-27 NOTE — ASSESSMENT & PLAN NOTE
· Multifactorial secondary to thrombocytopenia, dehydration causing acute on chronic renal failure, hypercalcemia, hyperglycemia  · Continue D5 containing fluids  Accu-Cheks insulin sliding scale  Hold long-acting insulin  Hemoglobin A1c 6 8  · Nephrology consulted for LAURA (see plan under LAURA) and hypercalcemia  · Continue IV fluids  · PT/OT evaluation for discharge recommendations  Patient current resides with her son and daughter-in-law

## 2022-01-27 NOTE — ED NOTES
Admitting aware of drop in BG again and change in IVF rate  Will continue to monitor       Pamela Jensen, RN  01/27/22 1065

## 2022-01-27 NOTE — ASSESSMENT & PLAN NOTE
Check TSH parathyroid hormone  Continue fluid  Will defer endocrinology consult to the primary team  Will hold vitamin D due to hypercalcemia

## 2022-01-27 NOTE — ASSESSMENT & PLAN NOTE
· According to outpatient thoracic surgeon notes from June 2021 "Adenocarcinoma of lung, right ([address] )  · No indication of recurrence

## 2022-01-27 NOTE — ED NOTES
Pt drinking OJ  More awake and talking with RNs stating "my sugar has gone low before   I really only eat breakfast and a little lunch I havent been eating as much"     Kush Gaytan, RN  01/26/22 2056

## 2022-01-27 NOTE — ED NOTES
Gave pt sips of juice D/T hypoglycemia  Pt began to cough/choke  Speech consult ordered, admitting team aware  Will continue to monitor       Dipak Romero RN  01/27/22 1293

## 2022-01-27 NOTE — ASSESSMENT & PLAN NOTE
Significant lab abnormalities  CT of abdomen reported 'Status post right lower lobectomy with minimal dependent atelectasis at the right lung base and trace right pleural effusion    Interval development of splenomegaly and extensive retroperitoneal/left inguinal adenopathy as described  Findings suspicious for lymphoma recurrence    Left hydronephrosis due to the extensive retroperitoneal adenopathy  Thickening of the left bladder wall adjacent to the conglomerate adenopathy    Direct invasion of the bladder cannot be excluded on this unenhanced exam '  Hematology consult  Will defer need of Urology consult to the primary team

## 2022-01-27 NOTE — ED NOTES
notified of pts blood glucose of 26  Amp of dextrose given at this time   Will continue to monitor     Robert Mendez RN  01/27/22 6403

## 2022-01-27 NOTE — ED NOTES
Pt attempted to give oj, pt took few sips and then coughed   HOB remains elevated, pt noted to be diaphoretic      Madhav Wolf RN  01/26/22 2044

## 2022-01-27 NOTE — CONSULTS
Consultation - [facility]  Xi Nguyễn 80 y o  female MRN: [id number]  Unit/Bed#: ED 23 Encounter: [id number]        Consults    Reason for Consult / Principal Problem:     Elevated liver enzymes       ASSESSMENT AND PLAN:    This 27-year-old female with past medical history of CKD3, right lung cancer status post lobectomy, diabetes, history of B cell lymphoma presented to the hospital because of fatigue  GI being consulted for elevated liver enzymes  Elevated liver enzymes    Patient has a mixed hepatocellular and cholestatic pattern with elevated bilirubin on admission  This could be related to hypoperfusion given patient with LAUAR and can be related to statin  Her liver function enzymes are already improving  CT abdomen and pelvis shows splenomegaly and extensive retroperitoneal adenopathy for lymphoma recurrence  No lesions were seen on the liver  · Patient's liver enzymes are already improving, she does not need any further workup at this time  · Follow-up on right upper quadrant ultrasound  · Stop statin, evaluate liver enzymes as outpatient and can consider starting it as outpatient    Pancytopenia    · Her hemoglobin is down from baseline, however also and lines are down  This could be related to her lymphoma  · No overt signs of GI bleed    GI will sign off at this time  Recommend outpatient follow-up with GI on discharge  Damaso Butcher MD   Gastroenterology Fellow     ______________________________________________________________________    HPI:  This 27-year-old female with past medical history of CKD3, right lung cancer status post lobectomy, diabetes, history of B cell lymphoma presented to the hospital because of fatigue  GI being consulted for elevated liver enzymes  Patient states that she was started on aspirin and statin 2 weeks ago  She denies using any over-the-counter medications  She denies being hypotensive at home    She denies any alcohol or illicit drug use  She states that she has been feeling extremely fatigued at home  She has also been complaining of some shortness of breath  REVIEW OF SYSTEMS:    CONSTITUTIONAL: Denies any fever, chills, rigors, and weight loss  HEENT: No earache or tinnitus  Denies hearing loss or visual disturbances  CARDIOVASCULAR: No chest pain or palpitations  RESPIRATORY: Denies any cough, hemoptysis, shortness of breath or dyspnea on exertion  GASTROINTESTINAL: As noted in the History of Present Illness  GENITOURINARY: No problems with urination  Denies any hematuria or dysuria  NEUROLOGIC: No dizziness or vertigo, denies headaches  MUSCULOSKELETAL: Denies any muscle or joint pain  SKIN: Denies skin rashes or itching  ENDOCRINE: Denies excessive thirst  Denies intolerance to heat or cold  PSYCHOSOCIAL: Denies depression or anxiety  Denies any recent memory loss         Historical Information   Past Medical History:   Diagnosis Date    Anemia     Bladder [facility] )     Cancer ([facility] )     [facility]     GERD (gastroesophageal reflux disease)     GERD without esophagitis     Hyperlipidemia     last assessed 4/12/13    Hypertension     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[facility] )     last assessed  7/12/13    Liver cancer [facility]     Lung cancer ([facility] )     Squamous Cell Lung CA -Right     Lymphoma ([facility]     Muscle strain of left lower leg 4/14/2021    Non Hodgkin's lymphoma ([facility] )     last assessed 1/20/14    Osteoporosis     last assessed  4/12/13    Right cataract     last assessed  3/2/15    Shingles      Past Surgical History:   Procedure Laterality Date    CATARACT EXTRACTION      EYE SURGERY      LIVER LOBECTOMY      LUNG REMOVAL, PARTIAL Right     lobectomy - onset approx 3/12/12 - right lower lobe     Social History   Social History     Substance and Sexual Activity   Alcohol Use No     Social History     Substance and Sexual Activity   Drug Use No     Social History     Tobacco Use Smoking Status Former Smoker    Packs/day: 2 00    Years: 40 00    Pack years: 80 00    Start date: 1951    Quit date: 12    Years since quittin 0   Smokeless Tobacco Never Used   Tobacco Comment    60 pack year hx, Quit 15 yrs ago  as per [employer]     Family History   Problem Relation Age of Onset    Brain cancer Mother     Other Mother         adenocarcinoma of the accessor sinuses    Cancer Mother     Diabetes Father     Leukemia Father     Cirrhosis Sister         Alcoholic's cirrhosis - Laennec's       Meds/Allergies     (Not in a hospital admission)    Current Facility-Administered Medications   Medication Dose Route Frequency    acetaminophen (TYLENOL) tablet 650 mg  650 mg Oral Q6H PRN    allopurinol (ZYLOPRIM) tablet 100 mg  100 mg Oral Daily    [START ON 2022] amLODIPine (NORVASC) tablet 5 mg  5 mg Oral Daily    brimonidine tartrate 0 2 % ophthalmic solution 1 drop  1 drop Right Eye TID    cyanocobalamin (VITAMIN B-12) tablet 500 mcg  500 mcg Oral Daily    dextrose 5 % and sodium chloride 0 9 % infusion  100 mL/hr Intravenous Continuous    docusate sodium (COLACE) capsule 100 mg  100 mg Oral BID    insulin lispro (HumaLOG) 100 units/mL subcutaneous injection 1-5 Units  1-5 Units Subcutaneous TID AC    insulin lispro (HumaLOG) 100 units/mL subcutaneous injection 1-5 Units  1-5 Units Subcutaneous HS    ondansetron (ZOFRAN) injection 4 mg  4 mg Intravenous Q6H PRN    pamidronate (AREDIA) 60 mg in sodium chloride 0 9 % 500 mL IVPB  60 mg Intravenous Once    pantoprazole (PROTONIX) EC tablet 20 mg  20 mg Oral Early Morning    potassium chloride (K-DUR,KLOR-CON) CR tablet 40 mEq  40 mEq Oral Once    [START ON 2022] predniSONE tablet 50 mg  50 mg Oral Daily       Allergies   Allergen Reactions    Eye Drops [Tetrahydrozoline] Eye Swelling     Patient states when she went to the eye doctor and received the eye dilation solution it irritated her eyes Objective     Blood pressure 119/58, pulse 84, temperature 99 3 °F (37 4 °C), temperature source Oral, resp  rate 18, SpO2 98 %  There is no height or weight on file to calculate BMI  Intake/Output Summary (Last 24 hours) at 1/27/2022 1336  Last data filed at 1/27/2022 0157  Gross per 24 hour   Intake 344 ml   Output --   Net 344 ml         PHYSICAL EXAM:      General Appearance:   Alert, cooperative, no distress   HEENT:   Normocephalic, atraumatic, anicteric  Neck:  Supple, symmetrical, trachea midline   Lungs:   Clear to auscultation bilaterally; no rales, rhonchi or wheezing; respirations unlabored    Heart[de-identified]   Regular rate and rhythm; no murmur, rub, or gallop     Abdomen:   Soft, non-tender, non-distended; normal bowel sounds; no masses, no organomegaly    Genitalia:   Deferred    Rectal:   Deferred    Extremities:  No cyanosis, clubbing or edema    Pulses:  2+ and symmetric all extremities    Skin:  No jaundice, rashes, or lesions    Lymph nodes:  No palpable cervical lymphadenopathy        Lab Results:   Admission on 01/26/2022   Component Date Value    WBC 01/26/2022 5 27     RBC 01/26/2022 3 70*    Hemoglobin 01/26/2022 10 2*    Hematocrit 01/26/2022 31 7*    MCV 01/26/2022 86     MCH 01/26/2022 27 6     MCHC 01/26/2022 32 2     RDW 01/26/2022 16 0*    Platelets 92/34/0759 19*    nRBC 01/26/2022 0     Neutrophils Relative 01/26/2022 79*    Immat GRANS % 01/26/2022 1     Lymphocytes Relative 01/26/2022 12*    Monocytes Relative 01/26/2022 8     Eosinophils Relative 01/26/2022 0     Basophils Relative 01/26/2022 0     Neutrophils Absolute 01/26/2022 4 19     Immature Grans Absolute 01/26/2022 0 04     Lymphocytes Absolute 01/26/2022 0 62     Monocytes Absolute 01/26/2022 0 40     Eosinophils Absolute 01/26/2022 0 00     Basophils Absolute 01/26/2022 0 02     Sodium 01/26/2022 136     Potassium 01/26/2022 3 5     Chloride 01/26/2022 103     CO2 01/26/2022 25     ANION GAP 01/26/2022 8     BUN 01/26/2022 88*    Creatinine 01/26/2022 2 94*    Glucose 01/26/2022 21*    Calcium 01/26/2022 12 8*    Corrected Calcium 01/26/2022 13 9*    AST 01/26/2022 287*    ALT 01/26/2022 182*    Alkaline Phosphatase 01/26/2022 543*    Total Protein 01/26/2022 6 3*    Albumin 01/26/2022 2 6*    Total Bilirubin 01/26/2022 1 16*    eGFR 01/26/2022 13     Total CK 01/26/2022 30     ABO Grouping 01/26/2022 B     Rh Factor 01/26/2022 Negative     Antibody Screen 01/26/2022 Negative     Specimen Expiration Date 01/26/2022 [id number]     POC Glucose 01/26/2022 161*    TSH 3RD GENERATON 01/27/2022 0 500     hs TnI 0hr 01/27/2022 55*    Color, UA 01/27/2022 Yellow     Clarity, UA 01/27/2022 Clear     Specific Gravity, UA 01/27/2022 1 025     pH, UA 01/27/2022 5 0     Leukocytes, UA 01/27/2022 Negative     Nitrite, UA 01/27/2022 Negative     Protein, UA 01/27/2022 Negative     Glucose, UA 01/27/2022 Negative     Ketones, UA 01/27/2022 Negative     Urobilinogen, UA 01/27/2022 0 2     Bilirubin, UA 01/27/2022 Negative     Blood, UA 01/27/2022 Negative     Osmolality, Ur 01/27/2022 536     Sodium, Ur 01/27/2022 20     Creatinine, Ur 01/27/2022 109 0     Creatinine, Ur 01/27/2022 109 0     Protein Urine Random 01/27/2022 35     Prot/Creat Ratio, Ur 01/27/2022 0 32*    Unit Product Code 01/27/2022 K0816C98     Unit Number 01/27/2022 [id number]     Unit ABO 01/27/2022 B     Unit [facility] 01/27/2022 NEG     Unit Dispense Status 01/27/2022 Presumed Trans     Unit Product Volume 01/27/2022 244     POC Glucose 01/27/2022 102     hs TnI 2hr 01/27/2022 45     Delta 2hr hsTnI 01/27/2022 -10     hs TnI 4hr 01/27/2022 55*    Delta 4hr hsTnI 01/27/2022 0     Sodium 01/27/2022 139     Potassium 01/27/2022 3 4*    Chloride 01/27/2022 104     CO2 01/27/2022 25     ANION GAP 01/27/2022 10     BUN 01/27/2022 88*    Creatinine 01/27/2022 2 88*    Glucose 01/27/2022 26*    Calcium 01/27/2022 12 7*    eGFR 01/27/2022 13     Magnesium 01/27/2022 1 9     Phosphorus 01/27/2022 2 4     WBC 01/27/2022 3 32*    RBC 01/27/2022 3 22*    Hemoglobin 01/27/2022 8 8*    Hematocrit 01/27/2022 28 0*    MCV 01/27/2022 87     MCH 01/27/2022 27 3     MCHC 01/27/2022 31 4     RDW 01/27/2022 16 5*    Platelets 91/88/5952 18*    MPV 01/27/2022 11 3     Total Bilirubin 01/27/2022 0 99     Bilirubin, Direct 01/27/2022 0 66*    Alkaline Phosphatase 01/27/2022 467*    AST 01/27/2022 246*    ALT 01/27/2022 164*    Total Protein 01/27/2022 5 7*    Albumin 01/27/2022 2 5*    Protime 01/27/2022 15 3*    INR 01/27/2022 1 27*    PTH 01/27/2022 11 2*    POC Glucose 01/27/2022 164*    POC Glucose 01/27/2022 84     POC Glucose 01/27/2022 137     POC Glucose 01/27/2022 117     POC Glucose 01/27/2022 50*    LD 01/27/2022 833*    Uric Acid 01/27/2022 15 7*    POC Glucose 01/27/2022 256*    POC Glucose 01/27/2022 124        Imaging Studies: I have personally reviewed pertinent imaging studies

## 2022-01-27 NOTE — ASSESSMENT & PLAN NOTE
According to outpatient thoracic surgeon notes from June 2021 "Adenocarcinoma of lung, right [facility])  Ms Lucas Ocampo is 9 years out from resection with no evidence of a new lung cancer  We had a discussion regarding further follow up, since she was wondering if she needs to continue surveillance  According to the patient, at her age, she would not have any findings further investigated or treated  Therefore, no further surveillance is required  We discussed this at length  We will not schedule a follow up appointment at this time    She is in complete agreement with the plan"

## 2022-01-27 NOTE — CONSULTS
Consultation - Nephrology   Brent Fuentes 80 y o  female MRN: [id number]  Unit/Bed#: ED 19 Encounter: [id number]    ASSESSMENT/PLAN:   1  Acute kidney injury, POA:  Likely prerenal in the setting of hypercalcemia, hctz and poor po intake +/-obstructive uropathy with left hydronephrosis  · Creatinine 3 3 on admission and improving down to 2 8 today with IV fluid  · UA:  [place]  · CT:  Mild left hydronephrosis due to extensive retroperitoneal adenopathy  · Urine protein creatinine ratio 0 32 g  · Continue IV fluid-- Currently on D5 NS at 125cc/h , will decrease rate to 100cc/h   · Check a m  BMP  2  CKD stage 4:  Baseline creatinine around 1 7-2 2 recently  3  Hypercalcemia: Corrected calcium 14 1 on admission  She was on hctz and vit D at home  Rule out malignancy related   · PTH appropriately suppressed at 11 2  · TSH normal  · Pending: PTHrP  · Will also check vit D, SPEP and UPEP   · Continue IV fluid and consider bisphosphonate when renal function improves  4  Anemia and thrombocytopenia:  Hematology consulted  5  History of diffuse large B-cell lymphoma:  CT findings with significant retroperitoneal and left inguinal adenopathy  Awaiting Heme/onc consult   · Patient has L leg edema likely related to this inguinal adenopathy   6  Hypertension: BP acceptable for age  Hold hctz for now  Continue amlodipine 5mg daily but change hold parameters for SBP <130   7  Hypokalemia: K 3 4 today  Will give k-dur 40meq po x 1   8  Transaminitis: LUQ u/s pending     HISTORY OF PRESENT ILLNESS:  Requesting Physician: Mehdi Singh MD  Reason for Consult:  Acute kidney injury and hypercalcemia    Brent Fuentes is a 80y o  year old female who was admitted to [facility] with abnormal labs  Patient had outpatient blood work done which revealed hypercalcemia, transaminitis, hypoglycemia with blood sugar 21 and acute kidney injury with creatinine 3 3 so she was asked to come to the emergency room    She recently has been having a lot of weakness  Also with poor appetite and decreased urine output for the past 2 days  CT in the ER revealed extensive retroperitoneal and left inguinal adenopathy as well as left hydronephrosis  She does have a history of B-cell lymphoma in remission  Patient denies any recent chest pain, shortness of breath, nausea, abdominal pain or diarrhea  She has left leg swelling which has been progressively worsening but no right edema  She also reports vomiting x1 yesterday which she thinks was related to drink too much orange juice  She denies taking any NSAIDs at home        PAST MEDICAL HISTORY:  Past Medical History:   Diagnosis Date    Anemia     [facility]     Cancer [facility]     Diabetes mellitus ([facility] )     GERD (gastroesophageal reflux disease)     GERD without esophagitis     Hyperlipidemia     last assessed 4/12/13    Hypertension     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[facility] )     last assessed  7/12/13    Liver cancer ([facility] )     Lung cancer ([facility] )     Squamous Cell Lung CA -Right     Lymphoma ([facility] )     Muscle strain of left lower leg 4/14/2021    Non Hodgkin's lymphoma ([facility] )     last assessed 1/20/14    Osteoporosis     last assessed  4/12/13    Right cataract     last assessed  3/2/15    Shingles        PAST SURGICAL HISTORY:  Past Surgical History:   Procedure Laterality Date    CATARACT EXTRACTION      EYE SURGERY      LIVER LOBECTOMY      LUNG REMOVAL, PARTIAL Right     lobectomy - onset approx 3/12/12 - right lower lobe       ALLERGIES:  Allergies   Allergen Reactions    Eye Drops [Tetrahydrozoline] Eye Swelling     Patient states when she went to the eye doctor and received the eye dilation solution it irritated her eyes       SOCIAL HISTORY:  Social History     Substance and Sexual Activity   Alcohol Use No     Social History     Substance and Sexual Activity   Drug Use No     Social History     Tobacco Use   Smoking Status Former Smoker    Packs/day: 2 00  Years: 40 00    Pack years: 80 00    Start date: 1951   Ty Trejo Quit date: 12    Years since quittin 0   Smokeless Tobacco Never Used   Tobacco Comment    60 pack year hx, Quit 15 yrs ago  as per [employer]       FAMILY HISTORY:  Family History   Problem Relation Age of Onset    Brain cancer Mother     Other Mother         adenocarcinoma of the accessor sinuses    Cancer Mother     Diabetes Father     Leukemia Father     Cirrhosis Sister         Alcoholic's cirrhosis - Laennec's       MEDICATIONS:  Scheduled Meds:  Current Facility-Administered Medications   Medication Dose Route Frequency Provider Last Rate    acetaminophen  650 mg Oral Q6H PRN Bernard Schmidt MD      amLODIPine  5 mg Oral Daily Bernard Schmidt MD      brimonidine tartrate  1 drop Right Eye TID Bernard Schmidt MD      vitamin B-12  500 mcg Oral Daily Bernard Schmidt MD      dextrose 5 % and sodium chloride 0 9 %  125 mL/hr Intravenous Continuous Bernard Schmidt  mL/hr (22 0530)    docusate sodium  100 mg Oral BID Bernard Schmidt MD      insulin lispro  1-5 Units Subcutaneous TID AC Bernard Schmidt MD      insulin lispro  1-5 Units Subcutaneous HS Bernard Schmidt MD      ondansetron  4 mg Intravenous Q6H PRN Bernard Schmidt MD      pantoprazole  20 mg Oral Early Morning Bernard Schmidt MD         PRN Meds:   acetaminophen    ondansetron    Continuous Infusions:dextrose 5 % and sodium chloride 0 9 %, 125 mL/hr, Last Rate: 125 mL/hr (22 0530)        REVIEW OF SYSTEMS:  A complete review of systems was done  Pertinent positives and negatives noted in the HPI but otherwise the review of systems is negative      PHYSICAL EXAM:  Current Weight:    First Weight:    Vitals:    22 0600   BP: 119/58   Pulse: 84   Resp: 18   Temp:    SpO2: 98%       Intake/Output Summary (Last 24 hours) at 2022 1052  Last data filed at 2022 0157  Gross per 24 hour   Intake 344 ml   Output -- Net 344 ml     General:  appears comfortable and in no acute distress   Skin:  No rash, warm, good skin turgor   Eyes:  Sclerae anicteric, no periorbital edema   ENT:  Dry oral mucosa  Neck:  Trachea midline, symmetric   Chest:  Decreased breath sounds bilaterally  CVS:  Regular rate and rhythm, chronic left leg edema in the thigh, right leg no edema  Abdomen:  Soft, nontender, nondistended  Neuro:  Awake and alert  Psych:  Appropriate affect    Lab Results:   Results from last 7 days   Lab Units 01/27/22  0439 01/26/22 1944 01/26/22 1943 01/26/22  0706   WBC Thousand/uL 3 32*  --  5 27 4 59   HEMOGLOBIN g/dL 8 8*  --  10 2* 11 0*   HEMATOCRIT % 28 0*  --  31 7* 35 3   PLATELETS Thousands/uL 18*  --  19* 22*   SODIUM mmol/L 139 136  --  135*   POTASSIUM mmol/L 3 4* 3 5  --  4 0   CHLORIDE mmol/L 104 103  --  100   CO2 mmol/L 25 25  --  23   BUN mg/dL 88* 88*  --  91*   CREATININE mg/dL 2 88* 2 94*  --  3 33*   CALCIUM mg/dL 12 7* 12 8*  --  13 1*   MAGNESIUM mg/dL 1 9  --   --   --    PHOSPHORUS mg/dL 2 4  --   --   --        Radiology Results:   CT head without contrast   Final Result by Trino Suero MD (01/26 2044)      No acute intracranial abnormality  No interval change  Workstation performed: [id number]         CT spine cervical without contrast   Final Result by Trino Suero MD (01/26 2051)      No cervical spine fracture or traumatic malalignment  Workstation performed: [id number]         CT chest abdomen pelvis wo contrast   Final Result by Trino Suero MD (01/26 2105)      Status post right lower lobectomy with minimal dependent atelectasis at the right lung base and trace right pleural effusion  Interval development of splenomegaly and extensive retroperitoneal/left inguinal adenopathy as described  Findings suspicious for lymphoma recurrence  Left hydronephrosis due to the extensive retroperitoneal adenopathy        Thickening of the left bladder wall adjacent to the conglomerate adenopathy    Direct invasion of the bladder cannot be excluded on this unenhanced exam                         Workstation performed: [id number]         US right upper quadrant    (Results Pending)

## 2022-01-27 NOTE — ASSESSMENT & PLAN NOTE
Lab Results   Component Value Date    EGFR 13 01/27/2022    EGFR 13 01/26/2022    EGFR 11 01/26/2022    CREATININE 2 88 (H) 01/27/2022    CREATININE 2 94 (H) 01/26/2022    CREATININE 3 33 (H) 01/26/2022     · Most likely prerenal in setting of decreased oral intake, diuretic use and hypercalcemia  · Creatinine on admission: 3 33  · Baseline creatinine:  1 7-2 2  · Nephrology following appreciate their input  · Continue IV fluids  · Avoid hypotension nephrotoxic agents

## 2022-01-27 NOTE — ASSESSMENT & PLAN NOTE
Continue to monitor a hepatic panel  Statin on hold  Continue IV fluids  Ultrasound of right upper quadrant ordered  GI consult

## 2022-01-27 NOTE — CONSULTS
Consultation - Palliative and Supportive Care   Zuhair Cortés 80 y o  female [id number]    Patient Active Problem List   Diagnosis    Essential hypertension    History of lung cancer    History of adenocarcinoma of lung    Anemia due to chronic kidney disease    Chronic GERD    [facility]album[facility]    Diffuse large B cell lymphoma [facility]    Glaucoma    Hyperlipidemia    Age-related osteoporosis without current pathological fracture    CKD (chronic kidney disease), stage IV (HCC)    Acute pain of right knee    Hypercalcemia    Hyperparathyroidism ([facility]    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 (HCC)    Left leg weakness    Fall    Generalized weakness    Acute renal failure superimposed on stage 3b chronic kidney disease (HCC)    Transaminitis    Thrombocytopenia (HCC)    Hypoglycemia associated with type 2 diabetes mellitus ([facility] )     Active issues specifically addressed today include:   CKD stage IV  Lung adenocarcinoma  GERD    Plan:  1  Symptom management  -  Per primary team, no acute symptoms currently    2  Goals  -  Limits set at DNAR/DNI  -  Ongoing goals of care conversations, optimally to include the patient if her mental status improves  Code Status: DNAR/DNI  - Level 3     Decisional apparatus:  Patient is not competent on my exam today  If competence is lost, patient's substitute decision maker would default to adult sons by [place]  Advance Directive / Living Will / POLST:  None on file    I have reviewed the patient's controlled substance dispensing history in the Prescription Drug Monitoring Program in compliance with the [facility] regulations before prescribing any controlled substances  We appreciate the invitation to be involved in this patient's care  We will continue to follow     Please do not hesitate to reach our on call provider through our clinic answering service at  should you have acute symptom control concerns  BURKE Molina  Palliative and Supportive Care  Clinic/Answering Service: [phone]  You can find me on [employer]! IDENTIFICATION:  Inpatient consult to Palliative Care  Consult performed by: BURKE Mcgarry  Consult ordered by: Garrick Valle        Physician Requesting Consult: Karen Barajas MD  Reason for Consult / Principal Problem: goals of care  Hx and PE limited by: patient unable to participate         HISTORY OF PRESENT ILLNESS:       David Ross is a 80 y o  female past medical history of CKD stage 3 right lung adenocarcinoma, diabetes, bladder cancer, B-cell lymphoma who presents with abnormal labs that were found as an outpatient by her PCP  The patient really oriented generalized weakness fatigue and cough  Labs revealed transaminitis as well as low platelets, a KI, hyper calcemia and hypoglycemia  CT findings in the ER revealed splenomegaly as well as extensive retroperitoneal and inguinal adenopathy which is suspicious for lymphoma recurrence  Patient was seen and evaluated by Hematology-Oncology and prednisone 50 mg daily was started to hopefully help control lymphoma and improve her pancytopenia  The patient lives with her son and daughter in law  She ambulated with a walker at baseline and was cognitively intact  About a week a go, she started having difficulty walking, feeling generally weak  Over the weekend, she fell two different occasions without injury  Spoke to the patient's son Nolan Mensah and daughter in law  Introduced palliative care  Discussed Code status with son Nolan Mensah who communicates that he would not want to pursue CPR or intubation  Code status changed to level 3  It is likely that the patient would want to pursue hospice however sons are hopeful that the patient will be able to participate in some goals of care conversations          Review of Systems   Unable to perform ROS: mental status change Past Medical History:   Diagnosis Date    Anemia     Bladder cancer ([facility] )     Cancer ([facility] )     Diabetes mellitus ([facility] )     GERD (gastroesophageal reflux disease)     GERD without esophagitis     Hyperlipidemia     last assessed 13    Hypertension     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[facility] )     last assessed  13    Liver cancer ([facility] )     Lung cancer ([facility] )     Squamous Cell Lung CA -Right     Lymphoma ([facility] )     Muscle strain of left lower leg 2021    Non Hodgkin's lymphoma ([facility] )     last assessed 14    Osteoporosis     last assessed  13    Right cataract     last assessed  3/2/15    Shingles      Past Surgical History:   Procedure Laterality Date    CATARACT EXTRACTION      EYE SURGERY      LIVER LOBECTOMY      LUNG REMOVAL, PARTIAL Right     lobectomy - onset approx 3/12/12 - right lower lobe     Social History     Socioeconomic History    Marital status:       Spouse name: Not on file    Number of children: Not on file    Years of education: Not on file    Highest education level: Not on file   Occupational History    Occupation: retired     Comment:    Tobacco Use    Smoking status: Former Smoker     Packs/day: 2 00     Years: 40 00     Pack years: 80 00     Start date: 1951     Quit date:      Years since quittin 0    Smokeless tobacco: Never Used    Tobacco comment: 60 pack year hx, Quit 15 yrs ago  as per [employer]   Substance and Sexual Activity    Alcohol use: No    Drug use: No    Sexual activity: Not Currently     Partners: Female   Other Topics Concern    Not on file   Social History Narrative    Not on file     Social Determinants of Health     Financial Resource Strain: Not on file   Food Insecurity: Not on file   Transportation Needs: Not on file   Physical Activity: Not on file   Stress: Not on file   Social Connections: Not on file   Intimate Partner Violence: Not on file   Housing Stability: Not on file Family History   Problem Relation Age of Onset    Brain cancer Mother     Other Mother         adenocarcinoma of the accessor sinuses    Cancer Mother     Diabetes Father     Leukemia Father     Cirrhosis Sister         Alcoholic's cirrhosis - Laennec's       MEDICATIONS / ALLERGIES:    current meds:   Current Facility-Administered Medications   Medication Dose Route Frequency    acetaminophen (TYLENOL) tablet 650 mg  650 mg Oral Q6H PRN    allopurinol (ZYLOPRIM) tablet 100 mg  100 mg Oral Daily    [START ON 1/28/2022] amLODIPine (NORVASC) tablet 5 mg  5 mg Oral Daily    brimonidine tartrate 0 2 % ophthalmic solution 1 drop  1 drop Right Eye TID    cyanocobalamin (VITAMIN B-12) tablet 500 mcg  500 mcg Oral Daily    docusate sodium (COLACE) capsule 100 mg  100 mg Oral BID    insulin lispro (HumaLOG) 100 units/mL subcutaneous injection 1-5 Units  1-5 Units Subcutaneous TID AC    insulin lispro (HumaLOG) 100 units/mL subcutaneous injection 1-5 Units  1-5 Units Subcutaneous HS    ondansetron (ZOFRAN) injection 4 mg  4 mg Intravenous Q6H PRN    pamidronate (AREDIA) 60 mg in sodium chloride 0 9 % 500 mL IVPB  60 mg Intravenous Once    pantoprazole (PROTONIX) EC tablet 20 mg  20 mg Oral Early Morning    potassium chloride (K-DUR,KLOR-CON) CR tablet 40 mEq  40 mEq Oral Once    [START ON 1/28/2022] predniSONE tablet 50 mg  50 mg Oral Daily       Allergies   Allergen Reactions    Eye Drops [Tetrahydrozoline] Eye Swelling     Patient states when she went to the eye doctor and received the eye dilation solution it irritated her eyes       OBJECTIVE:    Physical Exam  Physical Exam  Vitals and nursing note reviewed  Constitutional:       Appearance: She is ill-appearing  Interventions: Nasal cannula in place  Comments: Evidence of muscle wasting    HENT:      Head: Normocephalic and atraumatic  Mouth/Throat:      Mouth: Mucous membranes are dry  Pharynx: Oropharynx is clear     Eyes: General: Lids are normal       Extraocular Movements: Extraocular movements intact  Cardiovascular:      Rate and Rhythm: Regular rhythm  Tachycardia present  Pulses: Decreased pulses  Pulmonary:      Effort: Pulmonary effort is normal  Tachypnea present  No respiratory distress or retractions  Abdominal:      General: Bowel sounds are decreased  Palpations: Abdomen is soft  Tenderness: There is no abdominal tenderness  Musculoskeletal:      Cervical back: Normal range of motion and neck supple  Comments: Generally weak    Skin:     General: Skin is warm  Coloration: Skin is pale  Neurological:      Mental Status: She is lethargic and disoriented  GCS: GCS eye subscore is 3  GCS verbal subscore is 4  GCS motor subscore is 6  Psychiatric:         Attention and Perception: She is inattentive  Speech: Speech is delayed  Behavior: Behavior is withdrawn  Cognition and Memory: Cognition is impaired  Memory is impaired  Lab Results:   CBC:   Lab Results   Component Value Date    WBC 3 32 (L) 01/27/2022    HGB 8 8 (L) 01/27/2022    HCT 28 0 (L) 01/27/2022    MCV 87 01/27/2022    PLT 18 (LL) 01/27/2022    MCH 27 3 01/27/2022    MCHC 31 4 01/27/2022    RDW 16 5 (H) 01/27/2022    MPV 11 3 01/27/2022    NRBC 0 01/26/2022   , CMP:   Lab Results   Component Value Date    SODIUM 139 01/27/2022    K 3 4 (L) 01/27/2022     01/27/2022    CO2 25 01/27/2022    BUN 88 (H) 01/27/2022    CREATININE 2 88 (H) 01/27/2022    CALCIUM 12 7 (HH) 01/27/2022     (H) 01/27/2022     (H) 01/27/2022    ALKPHOS 467 (H) 01/27/2022    EGFR 13 01/27/2022   , PT/PTT:No results found for: PT, PTT      Counseling / Coordination of Care    Total floor / unit time spent today 45+  minutes  Greater than 50% of total time was spent with the patient and / or family counseling and / or coordination of care   A description of the counseling / coordination of care: chart review, collaboration with oncology, supportive listening, introduction of goals of care,

## 2022-01-27 NOTE — ASSESSMENT & PLAN NOTE
Multifactorial secondary to thrombocytopenia, dehydration acute on chronic renal failure hypercalcemia, hyperglycemia  Admit to inpatient  Continue D5 containing fluids Accu-Cheks insulin sliding scale  Hold long-acting insulin  Hemoglobin A1c 6 8  LAURA workup ordered  Nephrology consult  Follow-up hypercalcemia workup, patient has a history of primary hyperparathyroidism  Continue IV fluids, nephrology consult  Obtain rapid upper quadrant ultrasound for abnormal liver tests have patient also has evidence of getting worse lymphoma and splenomegaly      Hematology consult  GI consult  Hold aspirin  Will transfuse platelets  No medical or mechanical DVT prophylaxis

## 2022-01-27 NOTE — ASSESSMENT & PLAN NOTE
· Patient with a history of diffuse large B-cell lymphoma, originally diagnosed in 2007  S/p 8 cycles of R-CHOP at that time  · CT C/A/P:  Status post right lower lobectomy with minimal dependent atelectasis at the right lung base and trace right pleural effusion  Interval development of splenomegaly and extensive retroperitoneal/left inguinal adenopathy suspicious for lymphoma recurrence  Left hydronephrosis due to extensive retroperitoneal adenopathy  Thickening of the bladder wall adjacent to the conglomerate adenopathy  · Oncology consulted and appreciate their input  · Started prednisone 50 mg daily x5 days  Patient is not a candidate for further treatment of lymphoma given her advanced age  · Palliative care consulted for GOC/hospice discussion

## 2022-01-27 NOTE — SPEECH THERAPY NOTE
Speech Language/Pathology    Speech-Language Pathology Bedside Swallow Evaluation      Patient Name: Zuhair Cortés    [id number] Date: 1/27/2022     Problem List  Principal Problem:    Generalized weakness  Active Problems:    Essential hypertension    History of lung cancer    Diffuse large B cell lymphoma (HCC)    Hyperlipidemia    Hypercalcemia    Hyperparathyroidism ([facility] )    Acute renal failure superimposed on stage 3b chronic kidney disease (HCC)    Transaminitis    Thrombocytopenia (HCC)    Hypoglycemia associated with type 2 diabetes mellitus (HCC)    Hypokalemia      Past Medical History  Past Medical History:   Diagnosis Date    Anemia     Bladder cancer ([facility] )     Cancer ([facility] )     Diabetes mellitus ([facility] )     GERD (gastroesophageal reflux disease)     GERD without esophagitis     Hyperlipidemia     last assessed 4/12/13    Hypertension     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[facility] )     last assessed  7/12/13    Liver cancer ([facility] )     Lung cancer ([facility] )     Squamous Cell Lung CA -Right     Lymphoma ([facility] )     Muscle strain of left lower leg 4/14/2021    Non Hodgkin's lymphoma ([facility] )     last assessed 1/20/14    Osteoporosis     last assessed  4/12/13    Right cataract     last assessed  3/2/15    Shingles        Past Surgical History  Past Surgical History:   Procedure Laterality Date    CATARACT EXTRACTION      EYE SURGERY      LIVER LOBECTOMY      LUNG REMOVAL, PARTIAL Right     lobectomy - onset approx 3/12/12 - right lower lobe       Summary   Pt presented with s/s suggestive of moderate oral and suspected mild pharyngeal dysphagia  Symptoms or concerns included effortful and reduced mastication and bolus formation, oral residue upon transfer,  and s/s aspiration w/ thin liquids  Pt does not have dentures present impacting ability to efficiently manipulate solids  Oral manipulation of transfer of puree is functional  Swallows suspected fairly timely   Pt w/ throat clear and cough w/ thin liquids via cup and straw today, suspect reduced airway protection w/ this consistency  Risk/s for Aspiration: Mild      Recommended Diet: puree/level 1 diet and nectar thick liquids   Recommended Form of Meds: whole with liquid   Aspiration precautions and swallowing strategies: upright posture, only feed when fully alert, slow rate of feeding and small bites/sips  Other Recommendations: Continue frequent oral care        Current Medical Status  Pt is a 80 y o  female who presented to [facility] with generalized weakness, fatigue, cough, and decreased appetite for several weeks  She denies any fevers, chills, night sweats or abdominal pain  She did have labs checked and abnormalities were noted, and she was told to go to the ER  She did have a CT of chest and pelvis that shows new significant splenomegaly as well as retroperitoneal and inguinal adenopathy, concerning for recurrence of her diffuse large B-cell lymphoma  Additionally she has left hydronephrosis and LAURA, as well as bilateral lower extremity edema, which is likely more pronounced due to her adenopathy  She also has hypercalcemia  She has been weak for quite some time, and is unable to walk currently  She usually lives at home with her son  Current Precautions: Allergies:  No known food allergies    Past medical history:  Please see H&P for details    Special Studies:  XR spine lumbar 1/24: No acute osseous abnormality        Degenerative changes as described  Chronic compression fracture at T11  CT head 1/24: No acute intracranial abnormality      Punctate metallic density in the left anterior globe, suspicious for a retained foreign body, age indeterminate     1 0 cm soft tissue nodule the scalp vertex  Correlate with direct visual inspection      CT head 1/26: No acute intracranial abnormality  No interval change      CT spine cervical 1/26: No cervical spine fracture or traumatic malalignment      CT chest abdomen pelvis 1/26: Status post right lower lobectomy with minimal dependent atelectasis at the right lung base and trace right pleural effusion      Interval development of splenomegaly and extensive retroperitoneal/left inguinal adenopathy as described  Findings suspicious for lymphoma recurrence      Left hydronephrosis due to the extensive retroperitoneal adenopathy      Thickening of the left bladder wall adjacent to the conglomerate adenopathy  Direct invasion of the bladder cannot be excluded on this unenhanced exam     Social/Education/Vocational Hx:  Pt lives with family    Swallow Information   Current Risks for Dysphagia & Aspiration: AMS  Current Symptoms/Concerns: coughing with po  Current Diet: NPO   Baseline Diet: regular diet and thin liquids      Baseline Assessment   Behavior/Cognition: alert and lethargic  Speech/Language Status: able to participate in conversation and able to follow commands inconsistently  Patient Positioning: upright in bed  Pain Status/Interventions/Response to Interventions:  No report of or nonverbal indications of pain  Swallow Mechanism Exam  Facial: symmetrical  Labial: WFL  Lingual: WFL  Velum: unable to visualize  Mandible: adequate ROM  Dentition: edentulous and has dentures at home  Vocal quality:clear/adequate   Volitional Cough: strong/productive   Respiratory Status: on 2L O2      Consistencies Assessed and Performance   Consistencies Administered: thin liquids, nectar thick, honey thick, puree, soft solids and pills whole w/ nectar thick     Oral Stage: moderate  Mastication was adequate with the materials administered today  Bolus formation and transfer were functional with no significant oral residue noted  No overt s/s reduced oral control  Pharyngeal Stage: suspected mild  Swallow Mechanics:  Swallowing initiation appeared prompt  Laryngeal rise was palpated and judged to be within functional limits    No coughing, throat clearing, change in vocal quality or respiratory status noted today  Esophageal Concerns: none reported    Strategies and Efficacy: -     Summary and Recommendations (see above)    Results Reviewed with: patient, RN and PA     Treatment Recommended: Yes    Frequency of treatment: As able     Patient Stated Goal: none stated     Dysphagia LTG  -Patient will demonstrate safe and effective oral intake (without overt s/s significant oral/pharyngeal dysphagia including s/s penetration or aspiration) for the highest appropriate diet level       Short Term Goals:  -Pt will tolerate Dysphagia 1/pureed diet and  nectar thick liquid with no significant s/s oral or pharyngeal dysphagia across 1-3 diagnostic session/s    -Patient will tolerate trials of upgraded food and/or liquid texture with no significant s/s of oral or pharyngeal dysphagia including aspiration across 1-3 diagnostic sessions     -Patient will comply with a Video/Modified Barium Swallow study for more complete assessment of swallowing anatomy/physiology/aspiration risk and to assess efficacy of treatment techniques so as to best guide treatment plan    Speech Therapy Prognosis   Prognosis: good    Prognosis Considerations: age, medical status and prior medical history

## 2022-01-27 NOTE — PROGRESS NOTES
[facility]  Progress Note - Sebastian Hilario [id number], 80 y o  female MRN: [id number]  Unit/Bed#: ED 19 Encounter: [id number]  Primary Care Provider: Afsaneh Segundo DO   Date and time admitted to hospital: 1/26/2022  5:42 PM    * Generalized weakness  Assessment & Plan  · Multifactorial secondary to thrombocytopenia, dehydration causing acute on chronic renal failure, hypercalcemia, hyperglycemia  · Continue D5 containing fluids  Accu-Cheks insulin sliding scale  Hold long-acting insulin  Hemoglobin A1c 6 8  · Nephrology consulted for LAURA (see plan under LAURA) and hypercalcemia  · Continue IV fluids  · PT/OT evaluation for discharge recommendations  Patient current resides with her son and daughter-in-law  Hypokalemia  Assessment & Plan  · Potassium: 3 4  · Replete and monitor  Hypoglycemia associated with type 2 diabetes mellitus [facility])  Assessment & Plan  Lab Results   Component Value Date    HGBA1C 6 4 (H) 01/26/2022     Recent Labs     01/27/22  0903 01/27/22  1052 01/27/22  1219 01/27/22  1348   POCGLU 50* 256* 124 100     Blood Sugar Average: Last 72 hrs:  (P) 129 5   · Decreased oral intake causing hypoglycemia  · Hold glargine at this time  Thrombocytopenia ([address] )  Assessment & Plan  · Suspect secondary to lymphoma recurrence  · Discontinue aspirin  · Oncology consulted and appreciate their recommendations  Avoid transfusion given blood shortage and no evidence of bleeding      Acute renal failure superimposed on stage 3b chronic kidney disease [facility])  Assessment & Plan  Lab Results   Component Value Date    EGFR 13 01/27/2022    EGFR 13 01/26/2022    EGFR 11 01/26/2022    CREATININE 2 88 (H) 01/27/2022    CREATININE 2 94 (H) 01/26/2022    CREATININE 3 33 (H) 01/26/2022     · Most likely prerenal in setting of decreased oral intake, diuretic use and hypercalcemia  · Creatinine on admission: 3 33  · Baseline creatinine:  1 7-2 2  · Nephrology following appreciate their input  · Continue IV fluids  · Avoid hypotension nephrotoxic agents  Hyperparathyroidism ([address] )  Assessment & Plan  · TSH normal @ 0 500  · PTH low @ 11 2  · Will hold vitamin D due to hypercalcemia    Hypercalcemia  Assessment & Plan  · Calcium on admission: 12 8 (corrects to 13 9)  · PTH elevated @ 11 2  · S/p pamidronate 60 mg IV x1 dose  · Renal following and appreciate their recommendations  Hyperlipidemia  Assessment & Plan  · Hold statin due to elevated LFTs  · Seen by GI  No further work up at this time for elevated LFTs  Diffuse large B cell lymphoma ([facility] )  Assessment & Plan  · Patient with a history of diffuse large B-cell lymphoma, originally diagnosed in 2007  S/p 8 cycles of R-CHOP at that time  · CT C/A/P:  Status post right lower lobectomy with minimal dependent atelectasis at the right lung base and trace right pleural effusion  Interval development of splenomegaly and extensive retroperitoneal/left inguinal adenopathy suspicious for lymphoma recurrence  Left hydronephrosis due to extensive retroperitoneal adenopathy  Thickening of the bladder wall adjacent to the conglomerate adenopathy  · Oncology consulted and appreciate their input  · Started prednisone 50 mg daily x5 days  Patient is not a candidate for further treatment of lymphoma given her advanced age  · Palliative care consulted for GOC/hospice discussion  History of lung cancer  Assessment & Plan  · According to outpatient thoracic surgeon notes from June 2021 "Adenocarcinoma of lung, right ([address] )  · No indication of recurrence  Essential hypertension  Assessment & Plan  · Will continue amlodipine  · Hold hydrochlorothiazide secondary to LAURA on CKD      VTE Pharmacologic Prophylaxis: VTE Score: 6 High Risk (Score >/= 5) - Pharmacological DVT Prophylaxis Contraindicated  Sequential Compression Devices Ordered      Patient Centered Rounds: I performed bedside rounds with nursing staff today   Discussions with Specialists or Other Care Team Provider: Oncology    Education and Discussions with Family / Patient: Attempted to update  (son) via phone  Unable to contact  Time Spent for Care: 30 minutes  More than 50% of total time spent on counseling and coordination of care as described above  Current Length of Stay: 1 day(s)  Current Patient Status: Inpatient   Certification Statement: The patient will continue to require additional inpatient hospital stay due to pending decision regarding hospice evaluation  Discharge Plan: Anticipate discharge in 24-48 hrs to discharge location pending clinical course  Code Status: Level 3 - DNAR and DNI    Subjective:   Patient is seen in the ED where she appears very sleepy  She feels short of breath but does not appear in any respiratory distress  She denies any pain currently  Objective:     Vitals:   Temp (24hrs), Av 3 °F (37 4 °C), Min:98 1 °F (36 7 °C), Max:99 8 °F (37 7 °C)    Temp:  [98 1 °F (36 7 °C)-99 8 °F (37 7 °C)] 99 3 °F (37 4 °C)  HR:  [] 84  Resp:  [16-20] 18  BP: (119-159)/(57-71) 119/58  SpO2:  [95 %-100 %] 98 %  There is no height or weight on file to calculate BMI  Input and Output Summary (last 24 hours): Intake/Output Summary (Last 24 hours) at 2022 1522  Last data filed at 2022 0157  Gross per 24 hour   Intake 344 ml   Output --   Net 344 ml       Physical Exam:   Physical Exam  Vitals and nursing note reviewed  Constitutional:       General: She is not in acute distress  Appearance: She is well-developed  HENT:      Head: Normocephalic and atraumatic  Eyes:      Conjunctiva/sclera: Conjunctivae normal    Cardiovascular:      Rate and Rhythm: Normal rate and regular rhythm  Heart sounds: No murmur heard  Pulmonary:      Effort: Pulmonary effort is normal  No respiratory distress  Breath sounds: Normal breath sounds     Abdominal:      Palpations: Abdomen is soft       Tenderness: There is no abdominal tenderness  Musculoskeletal:      Cervical back: Neck supple  Skin:     General: Skin is warm and dry  Neurological:      Mental Status: She is lethargic  Additional Data:     Labs:  Results from last 7 days   Lab Units 01/27/22  0439 01/26/22  1943 01/26/22  1943   WBC Thousand/uL 3 32*   < > 5 27   HEMOGLOBIN g/dL 8 8*   < > 10 2*   HEMATOCRIT % 28 0*   < > 31 7*   PLATELETS Thousands/uL 18*   < > 19*   NEUTROS PCT %  --   --  79*   LYMPHS PCT %  --   --  12*   MONOS PCT %  --   --  8   EOS PCT %  --   --  0    < > = values in this interval not displayed       Results from last 7 days   Lab Units 01/27/22  0439   SODIUM mmol/L 139   POTASSIUM mmol/L 3 4*   CHLORIDE mmol/L 104   CO2 mmol/L 25   BUN mg/dL 88*   CREATININE mg/dL 2 88*   ANION GAP mmol/L 10   CALCIUM mg/dL 12 7*   ALBUMIN g/dL 2 5*   TOTAL BILIRUBIN mg/dL 0 99   ALK PHOS U/L 467*   ALT U/L 164*   AST U/L 246*   GLUCOSE RANDOM mg/dL 26*     Results from last 7 days   Lab Units 01/27/22  0439   INR  1 27*     Results from last 7 days   Lab Units 01/27/22  1348 01/27/22  1219 01/27/22  1052 01/27/22  0903 01/27/22  0712 01/27/22  0641 01/27/22  0610 01/27/22  0542 01/27/22  0023 01/26/22  2118   POC GLUCOSE mg/dl 100 124 256* 50* 117 137 84 164* 102 161*     Results from last 7 days   Lab Units 01/26/22  0706   HEMOGLOBIN A1C % 6 4*           Lines/Drains:  Invasive Devices  Report    Peripheral Intravenous Line            Peripheral IV 01/26/22 Left Antecubital <1 day    Peripheral IV 01/26/22 Right Forearm <1 day                Telemetry:  Telemetry Orders (From admission, onward)             24 Hour Telemetry Monitoring  Continuous x 24 Hours (Telem)        References:    Telemetry Guidelines   Question:  Reason for 24 Hour Telemetry  Answer:  Metabolic/Electrolyte Disturbance with High Probability of Dysrhythmia (K level <3 or >6, or KCL infusion >=10mEq/hr)                 Telemetry Reviewed: Normal Sinus Rhythm  Indication for Continued Telemetry Use: Metabolic/electrolyte disturbance with high probability of dysrhythmia             Imaging: Reviewed radiology reports from this admission including: chest CT scan and abdominal/pelvic CT    Recent Cultures (last 7 days):         Last 24 Hours Medication List:   Current Facility-Administered Medications   Medication Dose Route Frequency Provider Last Rate    acetaminophen  650 mg Oral Q6H PRN Lesa Bartholomew MD      allopurinol  100 mg Oral Daily BURKE Aguiar      [START ON 1/28/2022] amLODIPine  5 mg Oral Daily Carlos Wilkerson PA-C      brimonidine tartrate  1 drop Right Eye TID Lesa Bartholomew MD      vitamin B-12  500 mcg Oral Daily Lesa Bartholomew MD      dextrose 5 % and sodium chloride 0 9 %  100 mL/hr Intravenous Continuous Alecia K Leandro,  mL/hr (01/27/22 1357)    docusate sodium  100 mg Oral BID Lesa Bartholomew MD      insulin lispro  1-5 Units Subcutaneous TID AC Lesa Bartholomew MD      insulin lispro  1-5 Units Subcutaneous HS Lesa Bartholomew MD      ondansetron  4 mg Intravenous Q6H PRN Lesa Bartholomew MD      pamidronate (AREDIA) IVPB  60 mg Intravenous Once BURKE Aguiar      pantoprazole  20 mg Oral Early Morning Lesa Bartholomew MD      potassium chloride  40 mEq Oral Once Carlos Wilkerson PA-C      [START ON 1/28/2022] predniSONE  50 mg Oral Daily BURKE Sousa      rasburicase (ELITEK) in 50 mL IVPB  3 mg Intravenous Once BURKE Aguiar          Today, Patient Was Seen By: Meliton Homans, PA-C    **Please Note: This note may have been constructed using a voice recognition system  **

## 2022-01-27 NOTE — H&P
[facility]  H&P- Derrick Duran [birth year], 80 y o  female MRN: [id number]  Unit/Bed#: ED 19 Encounter: [id number]  Primary Care Provider: Lola Mejia DO   Date and time admitted to hospital: 1/26/2022  5:42 PM    * Generalized weakness  Assessment & Plan  Multifactorial secondary to thrombocytopenia, dehydration acute on chronic renal failure hypercalcemia, hyperglycemia  Admit to inpatient  Continue D5 containing fluids Accu-Cheks insulin sliding scale  Hold long-acting insulin  Hemoglobin A1c 6 8  LAURA workup ordered  Nephrology consult  Follow-up hypercalcemia workup, patient has a history of primary hyperparathyroidism  Continue IV fluids, nephrology consult  Obtain rapid upper quadrant ultrasound for abnormal liver tests have patient also has evidence of getting worse lymphoma and splenomegaly      Hematology consult  GI consult  Hold aspirin  Will transfuse platelets  No medical or mechanical DVT prophylaxis        Thrombocytopenia (HCC)  Assessment & Plan  Unclear etiology probably secondary to history of lymphoma  Hold aspirin  Hematology consult    Hypercalcemia  Assessment & Plan  Management as above    Acute renal failure superimposed on stage 3b chronic kidney disease [facility])  Assessment & Plan  Lab Results   Component Value Date    EGFR 13 01/26/2022    EGFR 11 01/26/2022    EGFR 23 11/01/2021    CREATININE 2 94 (H) 01/26/2022    CREATININE 3 33 (H) 01/26/2022    CREATININE 1 89 (H) 11/01/2021   Most likely secondary to decreased oral intake creatinine went up to 3 33 baseline 1 9  BUN 91 will continue IV hydration avoid nephrotoxic drugs and hypotension  LAURA workup ordered  Nephrology consult    Transaminitis  Assessment & Plan  Continue to monitor a hepatic panel  Statin on hold  Continue IV fluids  Ultrasound of right upper quadrant ordered  GI consult    Hypoglycemia associated with type 2 diabetes mellitus [facility])  Assessment & Plan  Lab Results   Component Value Date    HGBA1C 6 4 (H) 01/26/2022       Recent Labs     01/26/22 2118   POCGLU 161*       Blood Sugar Average: Last 72 hrs:  (P) 161   Decreased oral intake ,hold long-acting insulin   continue D5 containing fluids   Accu-Cheks insulin sliding scale    Hyperparathyroidism (HCC)  Assessment & Plan  Check TSH parathyroid hormone  Continue fluid  Will defer endocrinology consult to the primary team  Will hold vitamin D due to hypercalcemia    Hyperlipidemia  Assessment & Plan  Hold statin due to elevated LFTs    Diffuse large B cell lymphoma ([address] )  Assessment & Plan  Significant lab abnormalities  CT of abdomen reported 'Status post right lower lobectomy with minimal dependent atelectasis at the right lung base and trace right pleural effusion    Interval development of splenomegaly and extensive retroperitoneal/left inguinal adenopathy as described  Findings suspicious for lymphoma recurrence    Left hydronephrosis due to the extensive retroperitoneal adenopathy  Thickening of the left bladder wall adjacent to the conglomerate adenopathy  Direct invasion of the bladder cannot be excluded on this unenhanced exam '  Hematology consult  Will defer need of Urology consult to the primary team    History of lung cancer  Assessment & Plan  According to outpatient thoracic surgeon notes from June 2021 "Adenocarcinoma of lung, right [facility])  Ms Salma Parry is 9 years out from resection with no evidence of a new lung cancer  We had a discussion regarding further follow up, since she was wondering if she needs to continue surveillance  According to the patient, at her age, she would not have any findings further investigated or treated  Therefore, no further surveillance is required  We discussed this at length  We will not schedule a follow up appointment at this time    She is in complete agreement with the plan"    Essential hypertension  Assessment & Plan  Will continue amlodipine  Hold hydrochlorothiazide secondary to LAURA on CKD      VTE Pharmacologic Prophylaxis: VTE Score: 6 High Risk (Score >/= 5) - Pharmacological DVT Prophylaxis Contraindicated  Sequential Compression Devices Ordered  Code Status: Level 1 - Full Code       Anticipated Length of Stay: Patient will be admitted on an inpatient basis with an anticipated length of stay of greater than 2 midnights secondary to Multiple specialist consult, hypoglycemia  treatment with IV fluids  Total Time for Visit, including Counseling / Coordination of Care: 45 minutes Greater than 50% of this total time spent on direct patient counseling and coordination of care  Chief Complaint:  Fatigue    History of Present Illness:  Demetris Foster is a 80 y o  female with a PMH of CKD stage 3 ,right lung adeno carcinoma status post lobectomy , diabetes, bladder cancer, B-cell lymphoma who was called by PCP to come to the emergency room for further evaluation of lab abnormality  Patient reports generalized weakness fatigue cough  Denies fever chills chest or abdominal pain  Hello observe will call someone 13 1   platelets 22 BUN 91 creatinine 3 33 baseline 1 92 hemoglobin 10 2, blood glucose 21  CT chest abdomen and pelvis reported "Status post right lower lobectomy with minimal dependent atelectasis at the right lung base and trace right pleural effusion  Interval development of splenomegaly and extensive retroperitoneal/left inguinal adenopathy as described  Findings suspicious for lymphoma recurrence  Left hydronephrosis due to the extensive retroperitoneal adenopathy    Thickening of the left bladder wall adjacent to the conglomerate adenopathy   Direct invasion of the bladder cannot be excluded on this unenhanced exam "  Patient was given D50 and started on D5 containing fluids  She admitted to the hospitalist service on inpatient basis for multiple specialist consult, hypoglycemia LAURA correction and platelets transfusion    Review of Systems:  Review of Systems Constitutional: Positive for activity change  Past Medical and Surgical History:   Past Medical History:   Diagnosis Date    Anemia     Bladder cancer ([facility] )     Cancer ([facility] )     Diabetes mellitus ([facility] )     GERD (gastroesophageal reflux disease)     GERD without esophagitis     Hyperlipidemia     last assessed 4/12/13    Hypertension     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[facility] )     last assessed  7/12/13    Liver cancer ([facility] )     Lung cancer ([place] [address] )     Squamous Cell Lung CA -Right     Lymphoma ([facility] )     Muscle strain of left lower leg 4/14/2021    Non Hodgkin's lymphoma ([facility] )     last assessed 1/20/14    Osteoporosis     last assessed  4/12/13    Right cataract     last assessed  3/2/15    Shingles        Past Surgical History:   Procedure Laterality Date    CATARACT EXTRACTION      EYE SURGERY      LIVER LOBECTOMY      LUNG REMOVAL, PARTIAL Right     lobectomy - onset approx 3/12/12 - right lower lobe       Meds/Allergies:  Prior to Admission medications    Medication Sig Start Date End Date Taking? Authorizing Provider   acetaminophen (TYLENOL) 325 mg tablet Take 2 tablets (650 mg total) by mouth every 6 (six) hours as needed for mild pain, headaches or fever  7/22/16   Neha Pena,    amLODIPine (NORVASC) 5 mg tablet TAKE 1 TABLET DAILY 1/19/22   Almaz Colorado, DO   aspirin (ECOTRIN LOW STRENGTH) 81 mg EC tablet Take 1 tablet (81 mg total) by mouth daily 12/10/21   Nehemiah Walden MD   atorvastatin (LIPITOR) 20 mg tablet Take 1 tablet (20 mg total) by mouth daily 12/10/21   Nehemiah Walden MD   Azopt 1 % ophthalmic suspension 3 (three) times a day  10/27/20   Historical Provider, MD   brimonidine (Alphagan P) 0 1 % Alphagan P 0 1 % eye drops   ADMINISTER 1 DROP OPHTHALMICALLY IN THE RIGHT EYE THREE TIMES A DAY   [address] Provider, MD   cholecalciferol (VITAMIN D3) 1,000 units tablet Take 2,000 Units by mouth daily    Historical Provider, MD   Cyanocobalamin (VITAMIN B-12 PO) Take 1 tablet by mouth  Historical Provider, MD   hydrochlorothiazide (HYDRODIURIL) 12 5 mg tablet TAKE 1 TABLET DAILY 22   Brie Vang DO   insulin glargine (Basaglar KwikPen) 100 units/mL injection pen Inject 34 Units under the skin daily 10/15/21   Brie Vang DO   Insulin Pen Needle (BD ULTRA-FINE PEN NEEDLES) 29G X 12 7MM MISC by Does not apply route 2 (two) times a day 19   Brie Vang DO   Lumigan 0 01 % ophthalmic drops  21   Historical Provider, MD   Multiple Vitamin (MULTIVITAMIN) tablet Take 1 tablet by mouth daily  Historical Provider, MD   omeprazole (PriLOSEC) 20 mg delayed release capsule Take 20 mg by mouth daily  Historical Provider, MD     I have reviewed home medications with a medical source (PCP, Pharmacy, other)  Allergies: Allergies   Allergen Reactions    Eye Drops [Tetrahydrozoline] Eye Swelling     Patient states when she went to the eye doctor and received the eye dilation solution it irritated her eyes       Social History:  Marital Status:     Occupation: none  Patient Pre-hospital Living Situation: Home  Patient Pre-hospital Level of Mobility: walks  Patient Pre-hospital Diet Restrictions: reg  Substance Use History:   Social History     Substance and Sexual Activity   Alcohol Use No     Social History     Tobacco Use   Smoking Status Former Smoker    Packs/day: 2 00    Years: 40 00    Pack years: 80 00    Start date: 1951    Quit date: 12    Years since quittin 0   Smokeless Tobacco Never Used   Tobacco Comment    60 pack year hx, Quit 15 yrs ago  as per [employer]     Social History     Substance and Sexual Activity   Drug Use No       Family History:  Family History   Problem Relation Age of Onset    Brain cancer Mother     Other Mother         adenocarcinoma of the accessor sinuses    Cancer Mother     Diabetes Father     Leukemia Father     Cirrhosis Sister         Alcoholic's cirrhosis - Laennec's       Physical Exam: Vitals:   Blood Pressure: 124/57 (01/27/22 0300)  Pulse: 100 (01/27/22 0300)  Temperature: 99 3 °F (37 4 °C) (01/27/22 0157)  Temp Source: Oral (01/27/22 0157)  Respirations: 18 (01/27/22 0300)  SpO2: 99 % (01/27/22 0300)    Physical Exam  Constitutional:       Appearance: She is ill-appearing  She is not diaphoretic  HENT:      Head: Normocephalic  Mouth/Throat:      Mouth: Mucous membranes are dry  Cardiovascular:      Rate and Rhythm: Tachycardia present  Pulmonary:      Effort: Pulmonary effort is normal    Abdominal:      General: Abdomen is flat  Musculoskeletal:         General: No swelling  Skin:     Comments: Ecchymosis   Neurological:      General: No focal deficit present  Psychiatric:         Mood and Affect: Mood normal           Additional Data:     Lab Results:  Results from last 7 days   Lab Units 01/26/22  1943   WBC Thousand/uL 5 27   HEMOGLOBIN g/dL 10 2*   HEMATOCRIT % 31 7*   PLATELETS Thousands/uL 19*   NEUTROS PCT % 79*   LYMPHS PCT % 12*   MONOS PCT % 8   EOS PCT % 0     Results from last 7 days   Lab Units 01/26/22  1944   SODIUM mmol/L 136   POTASSIUM mmol/L 3 5   CHLORIDE mmol/L 103   CO2 mmol/L 25   BUN mg/dL 88*   CREATININE mg/dL 2 94*   ANION GAP mmol/L 8   CALCIUM mg/dL 12 8*   ALBUMIN g/dL 2 6*   TOTAL BILIRUBIN mg/dL 1 16*   ALK PHOS U/L 543*   ALT U/L 182*   AST U/L 287*   GLUCOSE RANDOM mg/dL 21*         Results from last 7 days   Lab Units 01/27/22  0023 01/26/22  2118   POC GLUCOSE mg/dl 102 161*     Results from last 7 days   Lab Units 01/26/22  0706   HEMOGLOBIN A1C % 6 4*           Imaging: Reviewed radiology reports from this admission including: abdominal/pelvic CT  CT head without contrast   Final Result by Erick Alvarez MD (01/26 2044)      No acute intracranial abnormality  No interval change                    Workstation performed: [id number]         CT spine cervical without contrast   Final Result by Erick Alvarez MD (01/26 2051)      No cervical spine fracture or traumatic malalignment  Workstation performed: [id number]         CT chest abdomen pelvis wo contrast   Final Result by Garland Morris MD (01/26 2105)      Status post right lower lobectomy with minimal dependent atelectasis at the right lung base and trace right pleural effusion  Interval development of splenomegaly and extensive retroperitoneal/left inguinal adenopathy as described  Findings suspicious for lymphoma recurrence  Left hydronephrosis due to the extensive retroperitoneal adenopathy  Thickening of the left bladder wall adjacent to the conglomerate adenopathy  Direct invasion of the bladder cannot be excluded on this unenhanced exam                         Workstation performed: [id number]         US right upper quadrant    (Results Pending)         ** Please Note: This note has been constructed using a voice recognition system   **

## 2022-01-27 NOTE — ED NOTES
5% dextrose and NS started at 150ml per hour per verbal order by Dr Josafat Lucio, RN  01/26/22 2051

## 2022-01-27 NOTE — ASSESSMENT & PLAN NOTE
Lab Results   Component Value Date    EGFR 13 01/26/2022    EGFR 11 01/26/2022    EGFR 23 11/01/2021    CREATININE 2 94 (H) 01/26/2022    CREATININE 3 33 (H) 01/26/2022    CREATININE 1 89 (H) 11/01/2021   Most likely secondary to decreased oral intake creatinine went up to 3 33 baseline 1 9  BUN 91 will continue IV hydration avoid nephrotoxic drugs and hypotension  LAURA workup ordered  Nephrology consult

## 2022-01-27 NOTE — ASSESSMENT & PLAN NOTE
Lab Results   Component Value Date    HGBA1C 6 4 (H) 01/26/2022       Recent Labs     01/26/22 2118   POCGLU 161*       Blood Sugar Average: Last 72 hrs:  (P) 161   Decreased oral intake ,hold long-acting insulin   continue D5 containing fluids   Accu-Cheks insulin sliding scale

## 2022-01-28 PROBLEM — E87.2 METABOLIC ACIDOSIS: Status: ACTIVE | Noted: 2022-01-01

## 2022-01-28 NOTE — ASSESSMENT & PLAN NOTE
· Suspect secondary to lymphoma recurrence  · Discontinue aspirin  · Oncology consulted and appreciate their recommendations    Platelet was 5 today and received 1 unit of platelet transfusion a early today  · No further blood work over transfusion

## 2022-01-28 NOTE — ASSESSMENT & PLAN NOTE
· Multifactorial secondary to thrombocytopenia, dehydration causing acute on chronic renal failure, hypercalcemia, hyperglycemia  · Continue D5 containing fluids  Accu-Cheks insulin sliding scale  Hold long-acting insulin  Hemoglobin A1c 6 8  · Nephrology consulted for LAURA (see plan under LAURA) and hypercalcemia    · Likely secondary to extensive lymphoma

## 2022-01-28 NOTE — ASSESSMENT & PLAN NOTE
· Patient with a history of diffuse large B-cell lymphoma, originally diagnosed in 2007  S/p 8 cycles of R-CHOP at that time  · CT C/A/P:  Status post right lower lobectomy with minimal dependent atelectasis at the right lung base and trace right pleural effusion  Interval development of splenomegaly and extensive retroperitoneal/left inguinal adenopathy suspicious for lymphoma recurrence  Left hydronephrosis due to extensive retroperitoneal adenopathy  Thickening of the bladder wall adjacent to the conglomerate adenopathy  · Oncology consulted and appreciate their input  · Started prednisone 50 mg daily x5 days  Patient is not a candidate for further treatment of lymphoma given her advanced age    · Palliative care consulted for GOC/hospice discussion-family made the decision to transition the patient to level 4 comfort care  · Continue with the allopurinol and prednisone as tolerated

## 2022-01-28 NOTE — ASSESSMENT & PLAN NOTE
· Patient noted to have metabolic acidosis  Discussed with nephrology started the patient on bicarb drip    Bicarb discontinued since the patient is noted to be transition to comfort care

## 2022-01-28 NOTE — ASSESSMENT & PLAN NOTE
Lab Results   Component Value Date    EGFR 14 01/28/2022    EGFR 13 01/27/2022    EGFR 13 01/26/2022    CREATININE 2 79 (H) 01/28/2022    CREATININE 2 88 (H) 01/27/2022    CREATININE 2 94 (H) 01/26/2022     · Most likely prerenal in setting of decreased oral intake, diuretic use and hypercalcemia  · Creatinine on admission: 3 33  · Baseline creatinine:  1 7-2 2  · Creatinine 2 7 today

## 2022-01-28 NOTE — PROGRESS NOTES
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 [facility]    Diffuse large B cell lymphoma ([facility]    Glaucoma    Hyperlipidemia    Age-related osteoporosis without current pathological fracture    CKD (chronic kidney disease), stage IV (HCC)    Acute pain of right knee    Hypercalcemia    Hyperparathyroidism ([facility]    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 (HCC)    Left leg weakness    Fall    Generalized weakness    Acute renal failure superimposed on stage 3b chronic kidney disease (HCC)    Transaminitis    Thrombocytopenia (HCC)    Hypoglycemia associated with type 2 diabetes mellitus ([facility] )    Hypokalemia     Active issues specifically addressed today include:     Plan:  1  Symptom management -    -     2  Goals -    -     Code Status:  - Level    Decisional apparatus:  Patient is not competent on my exam today  If competence is lost, patient's substitute decision maker would default  by [place]  Advance Directive / Living Will / POLST:      Interval history:           MEDICATIONS / ALLERGIES:     na    Allergies   Allergen Reactions    Eye Drops [Tetrahydrozoline] Eye Swelling     Patient states when she went to the eye doctor and received the eye dilation solution it irritated her eyes       OBJECTIVE:    Physical Exam  Physical Exam    Lab Results: I have personally reviewed pertinent labs  Imaging Studies:   EKG, Pathology, and Other Studies:     Counseling / Coordination of Care    Total floor / unit time spent today  minutes  Greater than 50% of total time was spent with the patient and / or family counseling and / or coordination of care   A description of the counseling / coordination of care:

## 2022-01-28 NOTE — ASSESSMENT & PLAN NOTE
· Patient noted to have low-grade fever and elevated procalcitonin both of which is secondary to lymphoma  · No further blood draws

## 2022-01-28 NOTE — PHYSICAL THERAPY NOTE
Physical Therapy Evaluation     Patient's Name: Mineral Abler    Admitting Diagnosis  Hypercalcemia [E83 52]  Splenomegaly [R16 1]  Weakness [R53 1]  Hypoglycemia [E16 2]  Thrombocytopenia (HCC) [D69 6]  Elevated LFTs [R79 89]  LAURA (acute kidney injury) ([address] ) [N17 9]  Generalized weakness [R53 1]  Diffuse large B-cell lymphoma, unspecified body region [facility]) [C83 30]    Problem List  Patient Active Problem List   Diagnosis    Essential hypertension    History of lung cancer    History of adenocarcinoma of lung    Anemia due to chronic kidney disease    Chronic GERD    Diabetes mellitus with microalbuminuria ([address] )    Diffuse large B cell lymphoma ([facility]    Glaucoma    Hyperlipidemia    Age-related osteoporosis without current pathological fracture    CKD (chronic kidney disease), stage IV (HCC)    Acute pain of right knee    Hypercalcemia    Hyperparathyroidism ([address] )    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 ([address] )    Left leg weakness    Fall    Generalized weakness    Acute renal failure superimposed on stage 3b chronic kidney disease (HCC)    Transaminitis    Thrombocytopenia (HCC)    Hypoglycemia associated with type 2 diabetes mellitus ([address] )    Hypokalemia       Past Medical History  Past Medical History:   Diagnosis Date    Anemia     Bladder cancer ([facility] )     Cancer ([address] )     Diabetes mellitus ([address] )     GERD (gastroesophageal reflux disease)     GERD without esophagitis     Hyperlipidemia     last assessed 4/12/13    Hypertension     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[address] )     last assessed  7/12/13    Liver cancer ([facility] )     Lung cancer ([facility] )     Squamous Cell Lung CA -Right     Lymphoma ([facility] )     Muscle strain of left lower leg 4/14/2021    Non Hodgkin's lymphoma ([facility] )     last assessed 1/20/14    Osteoporosis     last assessed  4/12/13    Right cataract     last assessed  3/2/15    Shingles        Past Surgical History  Past Surgical History:   Procedure Laterality Date    CATARACT EXTRACTION      EYE SURGERY      LIVER LOBECTOMY      LUNG REMOVAL, PARTIAL Right     lobectomy - onset approx 3/12/12 - right lower lobe          01/28/22 0943   PT Last Visit   PT Visit Date 01/28/22   Note Type   Note type Evaluation   Pain Assessment   Pain Assessment Tool 0-10   Pain Score No Pain   Restrictions/Precautions   Weight Bearing Precautions Per Order No   Other Precautions Chair Alarm; Bed Alarm; Fall Risk   Home Living   Type of [address] Two level;1/2 bath on main level  (3 [address]RIST[address] )   Bathroom Shower/Tub Walk-in shower   Bathroom Toilet Standard   Bathroom Equipment Grab bars in shower; Shower chair   Home Equipment Walker;Stair glide   Additional Comments pt ? historian, will need to confirm with CM   Prior Function   Level of [place] Independent with ADLs and functional mobility   Lives With Son  (+ DIL )   Receives Help From Family   ADL Assistance Independent   IADLs Needs assistance   Falls in the last 6 months 1 to 4  (3 per pt report )   Vocational Retired   Comments pt ? historian, will need to confirm with CM   Cognition   Attention Attends with cues to redirect   Orientation Level Oriented to person;Oriented to place;Oriented to situation  (month only )   Memory Decreased recall of precautions   Following Commands Follows one step commands with increased time or repetition   Comments pt pleasant and cooperative to participate in therapy session    RLE Assessment   RLE Assessment   (functionally 3/5 )   LLE Assessment   LLE Assessment   (functionally 3/5)   Bed Mobility   Supine to Sit 2  Maximal assistance   Additional items Assist x 2;HOB elevated; Bedrails; Increased time required;Verbal cues;LE management   Sit to Supine 2  Maximal assistance   Additional items Assist x 2; Increased time required;Verbal cues;LE management   Additional Comments Pt supine in bed upon arrival  Pt sitting EOB with min A to correct R lateral lean   Pt sitting OOB in bedside chair with chair alarm on  (updated RN to slide back to bed )   Transfers   Sit to Stand 2  Maximal assistance   Additional items Assist x 2; Increased time required;Verbal cues   Stand to Sit 2  Maximal assistance   Additional items Assist x 2; Increased time required;Verbal cues   Stand pivot 2  Maximal assistance   Additional items Assist x 2; Increased time required;Verbal cues   Additional Comments transfers with HHA  b/l knees buckling with max Ax2 to complete SPT to chair    Ambulation/Elevation   Gait pattern Not appropriate  (2* poor standing tolerance )   Balance   Static Sitting Fair -   Dynamic Sitting Poor +   Static Standing Poor   Dynamic Standing Poor   Ambulatory Poor -   Activity Tolerance   Activity Tolerance Patient limited by fatigue   Medical Staff Made Aware OT; co-eval performed this date 2* increased medical complexity and multiple co-morbidities    Nurse Made Aware RN cleared pt to participate in therapy session   (updated post therapy session )   Assessment   Prognosis Fair   Problem List Decreased strength;Decreased endurance; Impaired balance;Decreased mobility; Impaired judgement;Decreased safety awareness;Decreased skin integrity   Assessment Pt seen for high complexity PT evaluation  Pt with active PT eval/treat and up with A orders  Pt is a 80 y o  female who was admitted to [facility] on 1/26 with generalized weakness  Pt's current dx/ problem list include: HTN, hx of lung cancer, lymphoma, hypercalcemia, CKD, hypoglycemia, hypokalemia   Comorbidities affecting pt's physical performance at time of assessment are as follows:  has a past medical history of Anemia, Bladder cancer ([address] ), Cancer ([address] ), Diabetes mellitus ([address] ), GERD (gastroesophageal reflux disease), GERD without esophagitis, Hyperlipidemia, Hypertension, 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[address] ), Liver cancer ([address] ), Lung cancer ([address] ), Lymphoma ([address] ), Muscle strain of left lower leg (4/14/2021), Non Hodgkin's lymphoma ([facility] ), Osteoporosis, Right cataract, and Shingles  Personal factors affecting pt at time of initial examination include: steps to enter environment, limited home support, advanced age, past experience, inability to perform IADLs, inability to perform ADLs, inability to ambulate household distances, limited insight into impairments and recent fall(s)  Due to acute medical issues, ongoing medical workup for primary dx; pain, fall risk, increased reliance on more restrictive AD compared to baseline;  decreased activity tolerance compared to baseline, increased assistance needed from caregiver at current time, continuous telemetry monitoring, multiple lines, decline in overall functional mobility status; health management issues; note unstable clinical picture (high complexity)  At baseline, pt resides with son and DIL in 2 SH with 3 [place] and was independent using RW prior to hospital admission  Currently, upon initial examination, pt  is requiring max Ax2 for bed mobility skills; max Ax2 for functional transfers; PT to continue to follow and assess for ambulation  Currently, pt presents functioning below baseline and with overall mobility deficits 2* to: decreased LE strength/AROM; limited flexibility;  generalized weakness/ deconditioning; decreased endurance; decreased activity tolerance; decreased coordination; impaired balance; gait deviations; decreased safety awareness; SOB/FALCON; fatigue; impaired safety and judgement; limited insight into current deficits; bed/ chair alarms; multiple lines; as well as: weakness, decreased ROM, impaired balance, decreased endurance, gait deviations, decreased activity tolerance, decreased functional mobility tolerance and fall risk  Pt currently at increased risk for falls  At the end of evaluation, pt was left sitting bedside chair with alarm on with all needs in reach   Pt would benefit from continued skilled PT services while in hospital to address remaining limitations and maximize functional potential  PT to continue to follow pt and recommends rehab upon DC  The patient's AM-PAC Basic Mobility Inpatient Short Form Raw Score is 6  A Raw score of less than or equal to 16 suggests the patient may benefit from discharge to post-acute rehabilitation services  Please also refer to the recommendation of the Physical Therapist for safe discharge planning  Barriers to Discharge Inaccessible home environment   Goals   Patient Goals none stated    STG Expiration Date 02/11/22   Short Term Goal #1 STG 1  Pt will be able to perform bed mobility tasks with min A in order to improve overall functional mobility and assist in safe d/c  STG 2  Pt will be able to perform functional transfer with min A in order to improve overall functional mobility and assist in safe d/c  STG 3  Pt will improve sitting/standing static/dynamic balance 1/2 grade in order to improve functional mobility and assist in safe d/c  STG 4  Pt will improve LE strength by 1/2 grade in order to improve functional mobility and assist in safe d/c  PT to continue to follow and assess ambulation as appropriate and able   PT Treatment Day 0   Plan   Treatment/Interventions ADL retraining;Functional transfer training;LE strengthening/ROM; Endurance training;Patient/family training;Bed mobility;Spoke to nursing;Spoke to case management;OT   PT Frequency 2-3x/wk   Recommendation   PT Discharge Recommendation Post acute rehabilitation services   AM-PAC Basic Mobility Inpatient   Turning in Bed Without Bedrails 1   Lying on Back to Sitting on Edge of Flat Bed 1   Moving Bed to Chair 1   Standing Up From Chair 1   Walk in Room 1   Climb 3-5 Stairs 1   Basic Mobility Inpatient Raw Score 6   Turning Head Towards Sound 3   Follow Simple Instructions 3   Low Function Basic Mobility Raw Score 12   Low Function Basic Mobility Standardized Score 18 33   Highest Level Of Mobility   -[facility] Goal 2: Bed activities/Dependent transfer   -[facility] Highest Level of Mobility 3: Sit at edge of bed   -[facility] Goal Achieved Yes         Carrie Gonzalez, PT

## 2022-01-28 NOTE — CASE MANAGEMENT
Case Management Assessment    Patient name Christal Chauhan  Location [facility] 925/[facility] 925-01 MRN [id number]  : 1931 Date 2022       Current Admission Date: 2022  Current Admission Diagnosis:Generalized weakness   Patient Active Problem List    Diagnosis Date Noted    Lactic acidosis 2022    Hypokalemia 2022    Generalized weakness 2022    Acute renal failure superimposed on stage 3b chronic kidney disease ([facility] ) 2022    Transaminitis 2022    Thrombocytopenia ([facility] ) 2022    Hypoglycemia associated with type 2 diabetes mellitus [facility] ) 2022    Left leg weakness 2022    Fall 2022    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 ([facility] ) 12/10/2021    Hyperparathyroidism ([facility] ) 2021    Hypercalcemia 2021    Acute pain of right knee 2019    Age-related osteoporosis without current pathological fracture 2019    Elevated procalcitonin 2019    CKD (chronic kidney disease), stage IV ([facility] ) 2019    History of lung cancer 2018    Chronic GERD 2017    History of adenocarcinoma of lung 2017    Anemia due to chronic kidney disease 2016    Essential hypertension 2016    Diabetes mellitus with microalbuminuria ([facility] ) 2016    Diffuse large B cell lymphoma ([facility] ) 2015    Glaucoma 2014    Hyperlipidemia 2013      LOS (days): 2  Geometric Mean LOS (GMLOS) (days): 3 10  Days to GMLOS:1 3     OBJECTIVE:    Risk of Unplanned Readmission Score: 27         Current admission status: Inpatient       Preferred Pharmacy:   [facility] [place] [address]  Phone: [phone] Fax: [address] [address]  Phone: [phone] Fax: [phone]    Primary Care Provider: Jackie Wagner DO    Primary Insurance: MEDICARE  Secondary Insurance: AARP    ASSESSMENT:  Active Health Care Agents    There are no active Health Care Agents on file  Patient Information  Admitted from[de-identified] Home  Mental Status: Alert  During Assessment patient was accompanied by:  Son  Support Systems: Family members  [place] of Residence: [address] [place] [address] do you live in?: [facility]    Patient Information Continued  Does patient have a history of substance abuse?: No  Does patient have a history of Mental Health Diagnosis?: No    Means of Transportation  Means of Transport to Appts[de-identified] Family transport

## 2022-01-28 NOTE — QUICK NOTE
Epic alert this AM  Meetings SIRS criteria and platelets 5K  Low grade temperatures overnight, tachycardia, mild tachypnea  Pt endorses fatigue/weakness  No new complaints this am  Lungs CTA  HR tachycardic  Abdomen soft  Oriented x 3  LE edema noted  O2 stable on 2L, likely can wean  1 unit platelets this am  Continued leukopenia without neutropenia this am  Will test for COVID and check blood cultures given immunocompromised state and continued low grade temps  UA with intermittent confusion

## 2022-01-28 NOTE — PLAN OF CARE
Problem: PHYSICAL THERAPY ADULT  Goal: Performs mobility at highest level of function for planned discharge setting  See evaluation for individualized goals  Description: Treatment/Interventions: ADL retraining,Functional transfer training,LE strengthening/ROM,Endurance training,Patient/family training,Bed mobility,Spoke to nursing,Spoke to case management,OT          See flowsheet documentation for full assessment, interventions and recommendations  1/28/2022 1604 by Steven Sheppard, PT  Note: Prognosis: Fair  Problem List: Decreased strength,Decreased endurance,Impaired balance,Decreased mobility,Impaired judgement,Decreased safety awareness,Decreased skin integrity  Assessment: Pt seen for high complexity PT evaluation  Pt with active PT eval/treat and up with A orders  Pt is a 80 y o  female who was admitted to [facility] on 1/26 with generalized weakness  Pt's current dx/ problem list include: HTN, hx of lung cancer, lymphoma, hypercalcemia, CKD, hypoglycemia, hypokalemia  Comorbidities affecting pt's physical performance at time of assessment are as follows:  has a past medical history of Anemia, Bladder cancer ([address] ), Cancer ([address] ), Diabetes mellitus ([address] ), GERD (gastroesophageal reflux disease), GERD without esophagitis, Hyperlipidemia, Hypertension, 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[address] ), Liver cancer ([address] ), Lung cancer ([address] ), Lymphoma ([address] ), Muscle strain of left lower leg (4/14/2021), Non Hodgkin's lymphoma ([address] ), Osteoporosis, Right cataract, and Shingles  Personal factors affecting pt at time of initial examination include: steps to enter environment, limited home support, advanced age, past experience, inability to perform IADLs, inability to perform ADLs, inability to ambulate household distances, limited insight into impairments and recent fall(s)    Due to acute medical issues, ongoing medical workup for primary dx; pain, fall risk, increased reliance on more restrictive AD compared to baseline; decreased activity tolerance compared to baseline, increased assistance needed from caregiver at current time, continuous telemetry monitoring, multiple lines, decline in overall functional mobility status; health management issues; note unstable clinical picture (high complexity)  At baseline, pt resides with son and DIL in 2 SH with 3 [place] and was independent using RW prior to hospital admission  Currently, upon initial examination, pt  is requiring max Ax2 for bed mobility skills; max Ax2 for functional transfers; PT to continue to follow and assess for ambulation  Currently, pt presents functioning below baseline and with overall mobility deficits 2* to: decreased LE strength/AROM; limited flexibility;  generalized weakness/ deconditioning; decreased endurance; decreased activity tolerance; decreased coordination; impaired balance; gait deviations; decreased safety awareness; SOB/FALCON; fatigue; impaired safety and judgement; limited insight into current deficits; bed/ chair alarms; multiple lines; as well as: weakness, decreased ROM, impaired balance, decreased endurance, gait deviations, decreased activity tolerance, decreased functional mobility tolerance and fall risk  Pt currently at increased risk for falls  At the end of evaluation, pt was left sitting bedside chair with alarm on with all needs in reach  Pt would benefit from continued skilled PT services while in hospital to address remaining limitations and maximize functional potential  PT to continue to follow pt and recommends rehab upon DC  The patient's AM-PAC Basic Mobility Inpatient Short Form Raw Score is 6  A Raw score of less than or equal to 16 suggests the patient may benefit from discharge to post-acute rehabilitation services  Please also refer to the recommendation of the Physical Therapist for safe discharge planning    Barriers to Discharge: Inaccessible home environment        PT Discharge Recommendation: Post acute rehabilitation services          See flowsheet documentation for full assessment

## 2022-01-28 NOTE — PROGRESS NOTES
NEPHROLOGY PROGRESS NOTE   Kush Mims 80 y o  female MRN: [id number]  Unit/Bed#: [facility] 925-01 Encounter: [id number]  Reason for Consult: LAURA, electrolyte disorders      SUMMARY:    59-year-old female with history B-cell lymphoma presents to [facility] for abnormal outpatient blood work showing hypercalcemia, hypoglycemia, acute kidney injury and transaminitis  She has had decreased oral intake and decreased urine output  Nephrology consult for acute kidney injury and electrolyte disorders      ASSESSMENT and PLAN:    Acute kidney injury  --present on admission, admission creatinine 3 3 mg/dL  --presumed be secondary to prerenal azotemia/volume depletion in the setting of hypercalcemia causing renovasocontriction, combined with HCTZ use and poor oral intake along with obstructive uropathy with evidence of a left hydronephrosis due to extensive retroperitoneal adenopathy  --CT scan without contrast showed development of a left hydronephrosis due to extensive retroperitoneal adenopathy, along with 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  --urinalysis is bland with no evidence of microscopic hematuria or proteinuria  --urine protein had a ratio 0 3 but not in steady state  --uric acid was also elevated and trending down after receiving rasburicase  --currently getting resuscitation with D5 with normal saline  --will continue intravenous fluids, but will change to D5 half-normal saline with 75 mEq of sodium bicarbonate    Hypercalcemia  --setting of volume depletion and underlying malignancy  --trend daily ionized calcium level  --total serum calcium level is trending down  --received 60 mg of pamidronate on January 27    Hyperuricemia  --received rasburicase on January 27  --trend daily uric acid level    Hypertension  --controlled  --amlodipine 5 mg daily    Low bicarbonate level  --no pH  --elevated lactic acid of 6 6  --renal function slowly improving  --currently on normal saline  --will add sodium bicarbonate to the fluids  --check a venous blood gas tomorrow morning  --trend lactic acid  --positive anion gap when corrected for the albumin    Hypokalemia--resolved  --replace as needed    Diffuse large B-cell lymphoma  --diagnosed in 2007  --status post 8 cycles of R-CHOP  --currently started on prednisone 50 mg x 5 days  --agree with palliative care evaluation      SUBJECTIVE / INTERVAL HISTORY:    No chest pain or shortness of breath    OBJECTIVE:  Current Weight:    Vitals:    01/28/22 0653 01/28/22 0838 01/28/22 0910 01/28/22 0956   BP: (!) 109/45 123/99 132/50 125/50   Pulse:  91 98 96   Resp:  17 18 18   Temp: 100 5 °F (38 1 °C) 98 7 °F (37 1 °C) 98 4 °F (36 9 °C) 98 1 °F (36 7 °C)   TempSrc: Rectal      SpO2:  96%  97%       Intake/Output Summary (Last 24 hours) at 1/28/2022 1102  Last data filed at 1/28/2022 1003  Gross per 24 hour   Intake 2356 67 ml   Output 550 ml   Net 1806 67 ml       Review of Systems:    12 point ROS has been reviewed  Physical Exam  Vitals and nursing note reviewed  Constitutional:       General: She is not in acute distress  Appearance: She is well-developed  HENT:      Head: Normocephalic and atraumatic  Mouth/Throat:      Mouth: Mucous membranes are dry  Eyes:      General: No scleral icterus  Conjunctiva/sclera: Conjunctivae normal       Pupils: Pupils are equal, round, and reactive to light  Cardiovascular:      Rate and Rhythm: Normal rate and regular rhythm  Heart sounds: S1 normal and S2 normal  No murmur heard  No friction rub  No gallop  Pulmonary:      Effort: Pulmonary effort is normal  No respiratory distress  Breath sounds: Normal breath sounds  No wheezing or rales  Abdominal:      General: Bowel sounds are normal       Palpations: Abdomen is soft  Tenderness: There is no abdominal tenderness  There is no rebound  Musculoskeletal:         General: Normal range of motion        Cervical back: Normal range of motion and neck supple  Skin:     General: Skin is dry  Findings: No rash  Neurological:      Mental Status: She is alert and oriented to person, place, and time     Psychiatric:         Behavior: Behavior normal          Medications:    Current Facility-Administered Medications:     acetaminophen (TYLENOL) tablet 650 mg, 650 mg, Oral, Q6H PRN, Urszula Aguirre MD, 650 mg at 01/28/22 0531    allopurinol (ZYLOPRIM) tablet 100 mg, 100 mg, Oral, Daily, BURKE Sousa, 100 mg at 01/28/22 0916    amLODIPine (NORVASC) tablet 5 mg, 5 mg, Oral, Daily, WADE Pacheco-C, 5 mg at 01/28/22 9965    brimonidine tartrate 0 2 % ophthalmic solution 1 drop, 1 drop, Right Eye, TID, Urszula Aguirre MD, 1 drop at 01/28/22 1062    cyanocobalamin (VITAMIN B-12) tablet 500 mcg, 500 mcg, Oral, Daily, Urszula Aguirre MD, 500 mcg at 01/28/22 0915    docusate sodium (COLACE) capsule 100 mg, 100 mg, Oral, BID, Urszula Aguirre MD, 100 mg at 01/28/22 0915    insulin lispro (HumaLOG) 100 units/mL subcutaneous injection 1-5 Units, 1-5 Units, Subcutaneous, TID AC **AND** Fingerstick Glucose (POCT), , , TID AC, Urszula Aguirre MD    insulin lispro (HumaLOG) 100 units/mL subcutaneous injection 1-5 Units, 1-5 Units, Subcutaneous, HS, Urszula Aguirre MD    ondansetron [facility]) injection 4 mg, 4 mg, Intravenous, Q6H PRN, Urszula Aguirre MD    pantoprazole (PROTONIX) EC tablet 20 mg, 20 mg, Oral, Early Morning, Urszula Aguirre MD, 20 mg at 01/28/22 0531    potassium chloride (K-DUR,KLOR-CON) CR tablet 40 mEq, 40 mEq, Oral, Once, Yanet Mullen PA-C    predniSONE tablet 50 mg, 50 mg, Oral, Daily, BURKE Sousa, 50 mg at 01/28/22 0915    sodium bicarbonate 75 mEq in sodium chloride 0 45 % infusion, , Intravenous, Continuous, Thuan Madrigal MD    Laboratory Results:  Results from last 7 days   Lab Units 01/28/22  0508 01/27/22  0439 01/26/22 1944 01/26/22 1943 01/26/22  0706   WBC Thousand/uL 3 63* 3 32*  --  5 27 4 59 HEMOGLOBIN g/dL 8 6* 8 8*  --  10 2* 11 0*   HEMATOCRIT % 28 5* 28 0*  --  31 7* 35 3   PLATELETS Thousands/uL 5* 18*  --  19* 22*   POTASSIUM mmol/L 3 5 3 4* 3 5  --  4 0   CHLORIDE mmol/L 109* 104 103  --  100   CO2 mmol/L 18* 25 25  --  23   BUN mg/dL 82* 88* 88*  --  91*   CREATININE mg/dL 2 79* 2 88* 2 94*  --  3 33*   CALCIUM mg/dL 12 1* 12 7* 12 8*  --  13 1*   MAGNESIUM mg/dL 1 8 1 9  --   --   --    PHOSPHORUS mg/dL 3 3 2 4  --   --   --

## 2022-01-28 NOTE — PROGRESS NOTES
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 in the retroperitoneal and inguinal region  She also has multiple she organ dysfunction including significant renal failure, transaminitis, hypercalcemia, hyperuricemia, etc     After lengthy discussion the decision was made to pursue comfort care on hospice only since the patient has very poor performance status and is not in any shape to tolerate any type of procedures or active treatment  The son and daughter-in-law were in agreement  The palliative care team also seems to be in agreement as well  Subjective: It is not entirely clear if the patient is fully oriented  She was able to answer couple of the questions  Review of Systems   Constitutional: Positive for activity change, appetite change, fatigue and unexpected weight change  Negative for chills and fever  HENT: Negative for ear pain and sore throat  Eyes: Negative for pain and visual disturbance  Respiratory: Positive for shortness of breath  Negative for cough  Cardiovascular: Negative for chest pain and palpitations  Gastrointestinal: Negative for abdominal pain and vomiting  Genitourinary: Negative for dysuria and hematuria  Musculoskeletal: Negative for arthralgias and back pain  Skin: Negative for color change and rash  Neurological: Negative for seizures and syncope  Psychiatric/Behavioral: Positive for confusion and decreased concentration  All other systems reviewed and are negative        Objective:     Medication Administration - last 24 hours from 01/27/2022 1237 to 01/28/2022 1237       Date/Time Order Dose Route Action Action by     01/28/2022 0916 brimonidine tartrate 0 2 % ophthalmic solution 1 drop 1 drop Right Eye Given Gema Patel RN     01/27/2022 2240 brimonidine tartrate 0 2 % ophthalmic solution 1 drop 1 drop Right Eye Given Jonny Alcantara RN     01/27/2022 1636 brimonidine tartrate 0 2 % ophthalmic solution 1 drop 1 drop Right Eye Given Cj Stovall RN     01/28/2022 0915 cyanocobalamin (VITAMIN B-12) tablet 500 mcg 500 mcg Oral Given Gema Patel RN     01/27/2022 1426 cyanocobalamin (VITAMIN B-12) tablet 500 mcg 500 mcg Oral Given Cj Stovall RN     01/28/2022 0531 pantoprazole (PROTONIX) EC tablet 20 mg 20 mg Oral Given Jonny Alcantara RN     01/27/2022 1350 dextrose 5 % and sodium chloride 0 9 % infusion 0 mL/hr Intravenous Stopped Cj Stovall RN     01/28/2022 0531 acetaminophen (TYLENOL) tablet 650 mg 650 mg Oral Given Jonny Alcantara, JAIRO     01/28/2022 0915 docusate sodium (COLACE) capsule 100 mg 100 mg Oral Given Gema Patel RN     01/27/2022 1915 docusate sodium (COLACE) capsule 100 mg 100 mg Oral Not Given Jonny Alcantara RN     01/27/2022 1427 docusate sodium (COLACE) capsule 100 mg 100 mg Oral Given Cj Stovall RN     01/28/2022 1034 insulin lispro (HumaLOG) 100 units/mL subcutaneous injection 1-5 Units 1 Units Subcutaneous Not Given Gema Patel RN     01/28/2022 3902 insulin lispro (HumaLOG) 100 units/mL subcutaneous injection 1-5 Units 1 Units Subcutaneous Not Given Gema Patel RN     01/27/2022 1536 insulin lispro (HumaLOG) 100 units/mL subcutaneous injection 1-5 Units 0 Units Subcutaneous Hold Cj Stovall RN     01/27/2022 2133 insulin lispro (HumaLOG) 100 units/mL subcutaneous injection 1-5 Units 1 Units Subcutaneous Not Given Jonny Alcantara, RN     01/28/2022 5855 amLODIPine (NORVASC) tablet 5 mg 5 mg Oral Given Gema Patel, RN 01/27/2022 1655 pamidronate (AREDIA) 60 mg in sodium chloride 0 9 % 500 mL IVPB 60 mg Intravenous [address] Cassandra Bains RN     01/28/2022 1067 allopurinol (ZYLOPRIM) tablet 100 mg 100 mg Oral Given Joe St RN     01/28/2022 0915 predniSONE tablet 50 mg 50 mg Oral Given Joe St RN     01/27/2022 2240 dextrose 5 % and sodium chloride 0 9 % infusion 100 mL/hr Intravenous [facility]     01/27/2022 1357 dextrose 5 % and sodium chloride 0 9 % infusion 100 mL/hr Intravenous New Bag Cassandra Bains RN     01/27/2022 1657 rasburicase (ELITEK) 3 mg in sodium chloride 0 9 % 52 mL IVPB 0 mg Intravenous Stopped Cassandra Bains RN     01/27/2022 1627 rasburicase (ELITEK) 3 mg in sodium chloride 0 9 % 52 mL IVPB 3 mg Intravenous New Bag Cassandra Bains RN     01/28/2022 1117 sodium bicarbonate 75 mEq in sodium chloride 0 45 % infusion   Intravenous Canceled Entry Joe St RN          /50   Pulse 96   Temp 98 1 °F (36 7 °C)   Resp 18   SpO2 97%       Physical Exam  Constitutional:       General: She is in acute distress  Appearance: She is ill-appearing  HENT:      Head: Normocephalic and atraumatic  Nose: Nose normal    Cardiovascular:      Rate and Rhythm: Normal rate  Pulmonary:      Comments: Decreased breath sounds on the bases otherwise clear  Abdominal:      General: Bowel sounds are normal  There is distension  Comments: Obese abdomen   Musculoskeletal:         General: Swelling ( left leg) present  Cervical back: Normal range of motion  Left lower leg: Edema (Left lower extremity edema from the knee down) present     Psychiatric:         Mood and Affect: Mood normal       Comments: Questionable judgment         Recent Results (from the past 48 hour(s))   ECG 12 lead    Collection Time: 01/26/22  7:27 PM   Result Value Ref Range    Ventricular Rate 111 BPM    Atrial Rate 111 BPM    WV Interval 160 ms    QRSD Interval 116 ms    QT Interval 332 ms    QTC Interval 451 ms    P Axis 70 degrees    QRS Axis -39 degrees    T Wave Axis 118 degrees   CBC and differential    Collection Time: 01/26/22  7:43 PM   Result Value Ref Range    WBC 5 27 4 31 - 10 16 Thousand/uL    RBC 3 70 (L) 3 81 - 5 12 Million/uL    Hemoglobin 10 2 (L) 11 5 - 15 4 g/dL    Hematocrit 31 7 (L) 34 8 - 46 1 %    MCV 86 82 - 98 fL    MCH 27 6 26 8 - 34 3 pg    MCHC 32 2 31 4 - 37 4 g/dL    RDW 16 0 (H) 11 6 - 15 1 %    Platelets 19 (LL) 915 - 390 Thousands/uL    nRBC 0 /100 WBCs    Neutrophils Relative 79 (H) 43 - 75 %    Immat GRANS % 1 0 - 2 %    Lymphocytes Relative 12 (L) 14 - 44 %    Monocytes Relative 8 4 - 12 %    Eosinophils Relative 0 0 - 6 %    Basophils Relative 0 0 - 1 %    Neutrophils Absolute 4 19 1 85 - 7 62 Thousands/µL    Immature Grans Absolute 0 04 0 00 - 0 20 Thousand/uL    Lymphocytes Absolute 0 62 0 60 - 4 47 Thousands/µL    Monocytes Absolute 0 40 0 17 - 1 22 Thousand/µL    Eosinophils Absolute 0 00 0 00 - 0 61 Thousand/µL    Basophils Absolute 0 02 0 00 - 0 10 Thousands/µL   Type and screen    Collection Time: 01/26/22  7:43 PM   Result Value Ref Range    ABO Grouping B     Rh Factor Negative     Antibody Screen Negative     Specimen Expiration Date [id number]    Comprehensive metabolic panel    Collection Time: 01/26/22  7:44 PM   Result Value Ref Range    Sodium 136 136 - 145 mmol/L    Potassium 3 5 3 5 - 5 3 mmol/L    Chloride 103 100 - 108 mmol/L    CO2 25 21 - 32 mmol/L    ANION GAP 8 4 - 13 mmol/L    BUN 88 (H) 5 - 25 mg/dL    Creatinine 2 94 (H) 0 60 - 1 30 mg/dL    Glucose 21 (LL) 65 - 140 mg/dL    Calcium 12 8 (HH) 8 3 - 10 1 mg/dL    Corrected Calcium 13 9 (HH) 8 3 - 10 1 mg/dL     (H) 5 - 45 U/L     (H) 12 - 78 U/L    Alkaline Phosphatase 543 (H) 46 - 116 U/L    Total Protein 6 3 (L) 6 4 - 8 2 g/dL    Albumin 2 6 (L) 3 5 - 5 0 g/dL    Total Bilirubin 1 16 (H) 0 20 - 1 00 mg/dL    eGFR 13 ml/min/1 73sq m   CK (with reflex to MB) Collection Time: 01/26/22  7:44 PM   Result Value Ref Range    Total CK 30 26 - 192 U/L   Fingerstick Glucose (POCT)    Collection Time: 01/26/22  9:18 PM   Result Value Ref Range    POC Glucose 161 (H) 65 - 140 mg/dl   Fingerstick Glucose (POCT)    Collection Time: 01/27/22 12:23 AM   Result Value Ref Range    POC Glucose 102 65 - 140 mg/dl   TSH, 3rd generation    Collection Time: 01/27/22 12:26 AM   Result Value Ref Range    TSH 3RD GENERATON 0 500 0 358 - 3 740 uIU/mL   HS Troponin 0hr (reflex protocol)    Collection Time: 01/27/22 12:26 AM   Result Value Ref Range    hs TnI 0hr 55 (H) "Refer to ACS Flowchart"- see link ng/L   HS Troponin I 2hr    Collection Time: 01/27/22  2:34 AM   Result Value Ref Range    hs TnI 2hr 45 "Refer to ACS Flowchart"- see link ng/L    Delta 2hr hsTnI -10 <20 ng/L   Osmolality, urine    Collection Time: 01/27/22  4:39 AM   Result Value Ref Range    Osmolality, Ur 536 250 - 900 mmol/KG   Sodium, urine, random    Collection Time: 01/27/22  4:39 AM   Result Value Ref Range    Sodium, Ur 20    Creatinine, urine, random    Collection Time: 01/27/22  4:39 AM   Result Value Ref Range    Creatinine, Ur 109 0 mg/dL   Protein / creatinine ratio, urine    Collection Time: 01/27/22  4:39 AM   Result Value Ref Range    Creatinine, Ur 109 0 mg/dL    Protein Urine Random 35 mg/dL    Prot/Creat Ratio, Ur 0 32 (H) 0 00 - 0 10   HS Troponin I 4hr    Collection Time: 01/27/22  4:39 AM   Result Value Ref Range    hs TnI 4hr 55 (H) "Refer to ACS Flowchart"- see link ng/L    Delta 4hr hsTnI 0 <20 ng/L   Basic metabolic panel    Collection Time: 01/27/22  4:39 AM   Result Value Ref Range    Sodium 139 136 - 145 mmol/L    Potassium 3 4 (L) 3 5 - 5 3 mmol/L    Chloride 104 100 - 108 mmol/L    CO2 25 21 - 32 mmol/L    ANION GAP 10 4 - 13 mmol/L    BUN 88 (H) 5 - 25 mg/dL    Creatinine 2 88 (H) 0 60 - 1 30 mg/dL    Glucose 26 (LL) 65 - 140 mg/dL    Calcium 12 7 (HH) 8 3 - 10 1 mg/dL    eGFR 13 ml/min/1 73sq m Magnesium    Collection Time: 01/27/22  4:39 AM   Result Value Ref Range    Magnesium 1 9 1 6 - 2 6 mg/dL   Phosphorus    Collection Time: 01/27/22  4:39 AM   Result Value Ref Range    Phosphorus 2 4 2 3 - 4 1 mg/dL   CBC (With Platelets)    Collection Time: 01/27/22  4:39 AM   Result Value Ref Range    WBC 3 32 (L) 4 31 - 10 16 Thousand/uL    RBC 3 22 (L) 3 81 - 5 12 Million/uL    Hemoglobin 8 8 (L) 11 5 - 15 4 g/dL    Hematocrit 28 0 (L) 34 8 - 46 1 %    MCV 87 82 - 98 fL    MCH 27 3 26 8 - 34 3 pg    MCHC 31 4 31 4 - 37 4 g/dL    RDW 16 5 (H) 11 6 - 15 1 %    Platelets 18 (LL) 742 - 390 Thousands/uL    MPV 11 3 8 9 - 12 7 fL   Hepatic function panel    Collection Time: 01/27/22  4:39 AM   Result Value Ref Range    Total Bilirubin 0 99 0 20 - 1 00 mg/dL    Bilirubin, Direct 0 66 (H) 0 00 - 0 20 mg/dL    Alkaline Phosphatase 467 (H) 46 - 116 U/L     (H) 5 - 45 U/L     (H) 12 - 78 U/L    Total Protein 5 7 (L) 6 4 - 8 2 g/dL    Albumin 2 5 (L) 3 5 - 5 0 g/dL   Protime-INR    Collection Time: 01/27/22  4:39 AM   Result Value Ref Range    Protime 15 3 (H) 11 6 - 14 5 seconds    INR 1 27 (H) 0 84 - 1 19   PTH, intact    Collection Time: 01/27/22  4:39 AM   Result Value Ref Range    PTH 11 2 (L) 18 4 - 80 1 pg/mL   LD,Blood    Collection Time: 01/27/22  4:39 AM   Result Value Ref Range     (H) 81 - 234 U/L   Uric acid    Collection Time: 01/27/22  4:39 AM   Result Value Ref Range    Uric Acid 15 7 (H) 2 0 - 6 8 mg/dL   UA (URINE) with reflex to Scope    Collection Time: 01/27/22  4:40 AM   Result Value Ref Range    Color, UA Yellow     Clarity, UA Clear     Specific Gravity, UA 1 025 1 003 - 1 030    pH, UA 5 0 4 5, 5 0, 5 5, 6 0, 6 5, 7 0, 7 5, 8 0    Leukocytes, UA Negative Negative    Nitrite, UA Negative Negative    Protein, UA Negative Negative mg/dl    Glucose, UA Negative Negative mg/dl    Ketones, UA Negative Negative mg/dl    Urobilinogen, UA 0 2 0 2, 1 0 E U /dl E U /dl    Bilirubin, UA Negative Negative    Blood, UA Negative    Fingerstick Glucose (POCT)    Collection Time: 01/27/22  5:42 AM   Result Value Ref Range    POC Glucose 164 (H) 65 - 140 mg/dl   Prepare Leukoreduced Platelet Pheresis (1 pheresis product = 6-8 pooled units): 1 Product    Collection Time: 01/27/22  5:55 AM   Result Value Ref Range    Unit Product Code P6091K98     Unit Number [id number]     Unit ABO B     Unit RH NEG     Unit Dispense Status Presumed Trans     Unit Product Volume 244 mL   Fingerstick Glucose (POCT)    Collection Time: 01/27/22  6:10 AM   Result Value Ref Range    POC Glucose 84 65 - 140 mg/dl   Fingerstick Glucose (POCT)    Collection Time: 01/27/22  6:41 AM   Result Value Ref Range    POC Glucose 137 65 - 140 mg/dl   Fingerstick Glucose (POCT)    Collection Time: 01/27/22  7:12 AM   Result Value Ref Range    POC Glucose 117 65 - 140 mg/dl   Fingerstick Glucose (POCT)    Collection Time: 01/27/22  9:03 AM   Result Value Ref Range    POC Glucose 50 (L) 65 - 140 mg/dl   Fingerstick Glucose (POCT)    Collection Time: 01/27/22 10:52 AM   Result Value Ref Range    POC Glucose 256 (H) 65 - 140 mg/dl   Fingerstick Glucose (POCT)    Collection Time: 01/27/22 12:19 PM   Result Value Ref Range    POC Glucose 124 65 - 140 mg/dl   Fingerstick Glucose (POCT)    Collection Time: 01/27/22  1:48 PM   Result Value Ref Range    POC Glucose 100 65 - 140 mg/dl   Fingerstick Glucose (POCT)    Collection Time: 01/27/22  3:43 PM   Result Value Ref Range    POC Glucose 285 (H) 65 - 140 mg/dl   Fingerstick Glucose (POCT)    Collection Time: 01/27/22  5:26 PM   Result Value Ref Range    POC Glucose 93 65 - 140 mg/dl   Fingerstick Glucose (POCT)    Collection Time: 01/27/22  8:45 PM   Result Value Ref Range    POC Glucose 88 65 - 140 mg/dl   Comprehensive metabolic panel    Collection Time: 01/28/22  5:08 AM   Result Value Ref Range    Sodium 142 136 - 145 mmol/L    Potassium 3 5 3 5 - 5 3 mmol/L    Chloride 109 (H) 100 - 108 mmol/L    CO2 18 (L) 21 - 32 mmol/L    ANION GAP 15 (H) 4 - 13 mmol/L    BUN 82 (H) 5 - 25 mg/dL    Creatinine 2 79 (H) 0 60 - 1 30 mg/dL    Glucose 57 (L) 65 - 140 mg/dL    Calcium 12 1 (HH) 8 3 - 10 1 mg/dL    Corrected Calcium 13 7 (HH) 8 3 - 10 1 mg/dL     (H) 5 - 45 U/L     (H) 12 - 78 U/L    Alkaline Phosphatase 518 (H) 46 - 116 U/L    Total Protein 5 0 (L) 6 4 - 8 2 g/dL    Albumin 2 0 (L) 3 5 - 5 0 g/dL    Total Bilirubin 1 11 (H) 0 20 - 1 00 mg/dL    eGFR 14 ml/min/1 73sq m   CBC and differential    Collection Time: 01/28/22  5:08 AM   Result Value Ref Range    WBC 3 63 (L) 4 31 - 10 16 Thousand/uL    RBC 3 16 (L) 3 81 - 5 12 Million/uL    Hemoglobin 8 6 (L) 11 5 - 15 4 g/dL    Hematocrit 28 5 (L) 34 8 - 46 1 %    MCV 90 82 - 98 fL    MCH 27 2 26 8 - 34 3 pg    MCHC 30 2 (L) 31 4 - 37 4 g/dL    RDW 17 0 (H) 11 6 - 15 1 %    Platelets 5 (LL) 541 - 390 Thousands/uL    nRBC 0 /100 WBCs    Neutrophils Relative 71 43 - 75 %    Immat GRANS % 1 0 - 2 %    Lymphocytes Relative 15 14 - 44 %    Monocytes Relative 13 (H) 4 - 12 %    Eosinophils Relative 0 0 - 6 %    Basophils Relative 0 0 - 1 %    Neutrophils Absolute 2 56 1 85 - 7 62 Thousands/µL    Immature Grans Absolute 0 03 0 00 - 0 20 Thousand/uL    Lymphocytes Absolute 0 55 (L) 0 60 - 4 47 Thousands/µL    Monocytes Absolute 0 47 0 17 - 1 22 Thousand/µL    Eosinophils Absolute 0 01 0 00 - 0 61 Thousand/µL    Basophils Absolute 0 01 0 00 - 0 10 Thousands/µL   Uric acid    Collection Time: 01/28/22  5:08 AM   Result Value Ref Range    Uric Acid 10 5 (H) 2 0 - 6 8 mg/dL   Magnesium    Collection Time: 01/28/22  5:08 AM   Result Value Ref Range    Magnesium 1 8 1 6 - 2 6 mg/dL   Phosphorus    Collection Time: 01/28/22  5:08 AM   Result Value Ref Range    Phosphorus 3 3 2 3 - 4 1 mg/dL   COVID/FLU/RSV    Collection Time: 01/28/22  6:58 AM    Specimen: Nose; Nares   Result Value Ref Range    SARS-CoV-2 Negative Negative    INFLUENZA A PCR Negative Negative    INFLUENZA B PCR Negative Negative    RSV PCR Negative Negative   Lactic acid, plasma    Collection Time: 01/28/22  9:00 AM   Result Value Ref Range    LACTIC ACID 6 6 (HH) 0 5 - 2 0 mmol/L   Procalcitonin    Collection Time: 01/28/22  9:00 AM   Result Value Ref Range    Procalcitonin 2 51 (H) <=0 25 ng/ml   Prepare Leukoreduced Platelet Pheresis (1 pheresis product = 6-8 pooled units): 1 Product, Leukoreduced    Collection Time: 01/28/22  9:01 AM   Result Value Ref Range    Unit Product Code L4695R93     Unit Number [id number]     Unit ABO O     Unit [facility] POS     Unit Dispense Status Issued     Unit Product Volume 300 mL   Fingerstick Glucose (POCT)    Collection Time: 01/28/22  9:03 AM   Result Value Ref Range    POC Glucose 65 65 - 140 mg/dl   Fingerstick Glucose (POCT)    Collection Time: 01/28/22 10:33 AM   Result Value Ref Range    POC Glucose 70 65 - 140 mg/dl       CT chest abdomen pelvis wo contrast    Result Date: 1/26/2022  Narrative: CT CHEST, ABDOMEN AND PELVIS WITHOUT IV CONTRAST INDICATION:   falls, weakness  History of lung cancer and lymphoma  COMPARISON:  CT chest 5/24/2021 and CT chest/abdomen/pelvis 9/9/2011  TECHNIQUE: CT examination of the chest, abdomen and pelvis was performed without intravenous contrast   Axial, sagittal, and coronal 2D reformatted images were created from the source data and submitted for interpretation  Radiation dose length product (DLP) for this visit:  765 73 mGy-cm   This examination, like all CT scans performed in the [facility], was performed utilizing techniques to minimize radiation dose exposure, including the use of iterative  reconstruction and automated exposure control  Enteric contrast was not administered  FINDINGS: CHEST LUNGS:  Status post right lower lobectomy  No focal nodule  Minor atelectasis dependently at the right base  PLEURA:  Trace right pleural effusion   HEART/GREAT VESSELS: Heart is unremarkable for patient's age   No thoracic aortic aneurysm  MEDIASTINUM AND CASSIE:  Unremarkable  CHEST WALL AND LOWER NECK:   Unremarkable  ABDOMEN LIVER/BILIARY TREE:  Unremarkable  GALLBLADDER:  There are gallstone(s) within the gallbladder, without pericholecystic inflammatory changes  SPLEEN:  Interval development of splenomegaly measuring 14 cm  PANCREAS:  Atrophied  ADRENAL GLANDS:  Unremarkable  KIDNEYS/URETERS:  Simple right renal cyst   Interval development of mild left hydronephrosis due to extensive retroperitoneal adenopathy  STOMACH AND BOWEL:  There is colonic diverticulosis without evidence of acute diverticulitis  APPENDIX:  No findings to suggest appendicitis  ABDOMINOPELVIC CAVITY:  No ascites  No pneumoperitoneum  Interval development of extensive pathological conglomerate matted adenopathy in the retroperitoneum for example surrounding the left common iliac artery there is a lymphatic mass measuring 8 0 x  5 3 cm  Adenopathy extends into the left iliac chain and into the left inguinal region where there is conglomerate clementine mass measuring 5 7 x 3 9 cm  VESSELS:  Atherosclerotic changes are present  No evidence of aneurysm  PELVIS REPRODUCTIVE ORGANS:  Unremarkable for patient's age  URINARY BLADDER:  Significant bladder wall thickening towards the left adjacent to the extensive lymphadenopathy  ABDOMINAL WALL/INGUINAL REGIONS:  Anasarca  OSSEOUS STRUCTURES:  No acute fracture or destructive osseous lesion  Chronic T11 compression deformity  Impression: Status post right lower lobectomy with minimal dependent atelectasis at the right lung base and trace right pleural effusion  Interval development of splenomegaly and extensive retroperitoneal/left inguinal adenopathy as described  Findings suspicious for lymphoma recurrence  Left hydronephrosis due to the extensive retroperitoneal adenopathy  Thickening of the left bladder wall adjacent to the conglomerate adenopathy    Direct invasion of the bladder cannot be excluded on this unenhanced exam  Workstation performed: [id number]     XR spine lumbar minimum 4 views non injury    Result Date: 1/27/2022  Narrative: LUMBAR SPINE INDICATION:   R29 898: Other symptoms and signs involving the musculoskeletal system  Left leg weakness  COMPARISON:  Lumbar spine series 9/14/2010 and additional priors VIEWS:  XR SPINE LUMBAR MINIMUM 4 VIEWS NON INJURY FINDINGS: There are 5 non rib bearing lumbar vertebral bodies  There is no evidence of acute fracture or destructive osseous lesion  Moderate to severe compression fracture at T11 has been seen on prior CT examinations  Mild curvature of the lumbar spine to the left similar to the prior exam  Moderate-to-severe degenerative changes in the lumbar spine  This has progressed from 2010  The pedicles appear intact  There are atherosclerotic calcifications  Soft tissues are otherwise unremarkable  Impression: No acute osseous abnormality  Degenerative changes as described  Chronic compression fracture at T11  Workstation performed: [id number]     CT head without contrast    Result Date: 1/26/2022  Narrative: CT BRAIN - WITHOUT CONTRAST INDICATION:   fall, low platelets  COMPARISON:  CT scan 1/24/2022 TECHNIQUE:  CT examination of the brain was performed  In addition to axial images, sagittal and coronal 2D reformatted images were created and submitted for interpretation  Radiation dose length product (DLP) for this visit:  859 58 mGy-cm   This examination, like all CT scans performed in the [facility], was performed utilizing techniques to minimize radiation dose exposure, including the use of iterative  reconstruction and automated exposure control  IMAGE QUALITY:  Diagnostic   FINDINGS: PARENCHYMA: Decreased attenuation is noted in periventricular and subcortical white matter demonstrating an appearance that is statistically most likely to represent moderate microangiopathic change; this appearance is similar when compared to most recent prior examination  No CT signs of acute infarction  No intracranial mass, mass effect or midline shift  No acute parenchymal hemorrhage  VENTRICLES AND EXTRA-AXIAL SPACES:  Ventricles and extra-axial CSF spaces are prominent commensurate with the degree of volume loss  No hydrocephalus  No acute extra-axial hemorrhage  VISUALIZED ORBITS AND PARANASAL SINUSES:  Sinuses are clear  Stable 3 mm metallic foreign body anterior to the left globe  CALVARIUM AND EXTRACRANIAL SOFT TISSUES:  Stable soft tissue nodule at the vertex of the scalp measuring 1 8 cm  Impression: No acute intracranial abnormality  No interval change  Workstation performed: [id number]     CT head wo contrast    Result Date: 1/24/2022  Narrative: CT BRAIN - WITHOUT CONTRAST INDICATION:   R29 898: Other symptoms and signs involving the musculoskeletal system W19  XXXD: Unspecified fall, subsequent encounter  COMPARISON:  Multiple priors most recently 6/20/2004 TECHNIQUE:  CT examination of the brain was performed  In addition to axial images, sagittal and coronal 2D reformatted images were created and submitted for interpretation  Radiation dose length product (DLP) for this visit:  976 62 004 mGy-cm   This examination, like all CT scans performed in the [facility], was performed utilizing techniques to minimize radiation dose exposure, including the use of iterative reconstruction and automated exposure control  IMAGE QUALITY:  Diagnostic  FINDINGS: PARENCHYMA: Decreased attenuation is noted in periventricular and subcortical white matter demonstrating an appearance that is statistically most likely to represent moderate microangiopathic change  No CT signs of acute infarction  No intracranial mass, mass effect or midline shift  No acute parenchymal hemorrhage  VENTRICLES AND EXTRA-AXIAL SPACES:  Volume loss without hydrocephalus   VISUALIZED ORBITS AND PARANASAL SINUSES:  Bilateral globes are aphakic  Punctate metallic density in the left anterior globe, suspicious for a retained foreign body CALVARIUM AND EXTRACRANIAL SOFT TISSUES:  1 cm soft tissue nodule at the scalp vertex     Impression: No acute intracranial abnormality  Punctate metallic density in the left anterior globe, suspicious for a retained foreign body, age indeterminate 1 0 cm soft tissue nodule the scalp vertex  Correlate with direct visual inspection  The study was marked in [facility] for immediate notification  Workstation performed: [id number]     CT spine cervical without contrast    Result Date: 1/26/2022  Narrative: CT CERVICAL SPINE - WITHOUT CONTRAST INDICATION:   falls, weakness  COMPARISON:  CT cervical spine 8/12/2013 TECHNIQUE:  CT examination of the cervical spine was performed without intravenous contrast   Contiguous axial images were obtained  Sagittal and coronal reconstructions were performed  Radiation dose length product (DLP) for this visit:  411 74 mGy-cm   This examination, like all CT scans performed in the [facility], was performed utilizing techniques to minimize radiation dose exposure, including the use of iterative  reconstruction and automated exposure control  IMAGE QUALITY:  Diagnostic  FINDINGS: ALIGNMENT:  Normal alignment of the cervical spine  No subluxation  VERTEBRAL BODIES:  No fracture  DEGENERATIVE CHANGES:  Stable moderate multilevel cervical degenerative changes are noted, most severe at C4-C5 and C5-C6  No critical central canal stenosis  PREVERTEBRAL AND PARASPINAL SOFT TISSUES:  Unremarkable  THORACIC INLET:  Normal      Impression: No cervical spine fracture or traumatic malalignment  Workstation performed: [id number]     Echo complete w/ contrast if indicated    Result Date: 1/7/2022  Narrative: Giuseppe Sorto  Left Ventricle: Left ventricular cavity size is normal  The left ventricular ejection fraction is 55%   Systolic function is normal  Wall motion is normal  Diastolic function is mildly abnormal, consistent with grade I (abnormal) relaxation    Aortic Valve: There is mild regurgitation    Mitral Valve: There is mild annular calcification  I have personally reviewed labs, imaging studies, and pertinent reports  This note has been generated by voice recognition software system  Therefore, there may be spelling, grammar, and or syntax errors  Please contact if questions arise

## 2022-01-28 NOTE — PROGRESS NOTES
[address] [facility]  Progress Note - Sandra Salas [birth year], 80 y o  female MRN: [id number]  Unit/Bed#: [facility] 925-01 Encounter: [id number]  Primary Care Provider: Tammie Moya DO   Date and time admitted to hospital: 1/26/2022  5:42 PM    * Generalized weakness  Assessment & Plan  · Multifactorial secondary to thrombocytopenia, dehydration causing acute on chronic renal failure, hypercalcemia, hyperglycemia  · Continue D5 containing fluids  Accu-Cheks insulin sliding scale  Hold long-acting insulin  Hemoglobin A1c 6 8  · Nephrology consulted for LAURA (see plan under LAURA) and hypercalcemia  · Likely secondary to extensive lymphoma    Lactic acidosis  Assessment & Plan  · Patient noted to have metabolic acidosis  Discussed with nephrology started the patient on bicarb drip  Bicarb discontinued since the patient is noted to be transition to comfort care    Hypoglycemia associated with type 2 diabetes mellitus [facility])  Assessment & Plan  Lab Results   Component Value Date    HGBA1C 6 4 (H) 01/26/2022     Recent Labs     01/27/22  1726 01/27/22  2045 01/28/22  0903 01/28/22  1033   POCGLU 93 88 65 70     Blood Sugar Average: Last 72 hrs:  (P) 126 4   · Decreased oral intake causing hypoglycemia  · Hold glargine at this time  · Will discontinue Accu-Cheks since the patient is transition to level 4 comfort care    Thrombocytopenia ([address] )  Assessment & Plan  · Suspect secondary to lymphoma recurrence  · Discontinue aspirin  · Oncology consulted and appreciate their recommendations    Platelet was 5 today and received 1 unit of platelet transfusion a early today  · No further blood work over transfusion    Transaminitis  Assessment & Plan  Continue to monitor a hepatic panel  Secondary to lymphoma    Acute renal failure superimposed on stage 3b chronic kidney disease [facility])  Assessment & Plan  Lab Results   Component Value Date    EGFR 14 01/28/2022    EGFR 13 01/27/2022    EGFR 13 01/26/2022    CREATININE 2 79 (H) 01/28/2022    CREATININE 2 88 (H) 01/27/2022    CREATININE 2 94 (H) 01/26/2022     · Most likely prerenal in setting of decreased oral intake, diuretic use and hypercalcemia  · Creatinine on admission: 3 33  · Baseline creatinine:  1 7-2 2  · Creatinine 2 7 today    Hyperparathyroidism (HCC)  Assessment & Plan  · TSH normal @ 0 500  · PTH low @ 11 2  · PTH suppressed due to hypercalcemia    Hypercalcemia  Assessment & Plan  · Calcium on admission: 12 8 (corrects to 13 9)  · PTH elevated @ 11 2  · S/p pamidronate 60 mg IV x1 dose  · Today corrected calcium is 13 7-secondary to malignancy    Elevated procalcitonin  Assessment & Plan  · Patient noted to have low-grade fever and elevated procalcitonin both of which is secondary to lymphoma  · No further blood draws    Hyperlipidemia  Assessment & Plan  · Hold statin due to elevated LFTs      Diffuse large B cell lymphoma ([facility] [address] )  Assessment & Plan  · Patient with a history of diffuse large B-cell lymphoma, originally diagnosed in 2007  S/p 8 cycles of R-CHOP at that time  · CT C/A/P:  Status post right lower lobectomy with minimal dependent atelectasis at the right lung base and trace right pleural effusion  Interval development of splenomegaly and extensive retroperitoneal/left inguinal adenopathy suspicious for lymphoma recurrence  Left hydronephrosis due to extensive retroperitoneal adenopathy  Thickening of the bladder wall adjacent to the conglomerate adenopathy  · Oncology consulted and appreciate their input  · Started prednisone 50 mg daily x5 days  Patient is not a candidate for further treatment of lymphoma given her advanced age    · Palliative care consulted for GOC/hospice discussion-family made the decision to transition the patient to level 4 comfort care  · Continue with the allopurinol and prednisone as tolerated    History of lung cancer  Assessment & Plan  · According to outpatient thoracic surgeon notes from 2021 "Adenocarcinoma of lung, right [facility])  · No indication of recurrence  Essential hypertension  Assessment & Plan  · Discontinue Norvasc  · Hold hydrochlorothiazide secondary to LAURA on CKD      VTE Pharmacologic Prophylaxis:   Pharmacologic: Note indicated since the patient is comfort care  Mechanical VTE Prophylaxis in Place: No    Patient Centered Rounds: I have performed bedside rounds with nursing staff today  Discussions with Specialists or Other Care Team Provider:     Education and Discussions with Family / Patient:  Updated  family by Hematology and palliative care  Time Spent for Care: 45 minutes  More than 50% of total time spent on counseling and coordination of care as described above  Current Length of Stay: 2 day(s)    Current Patient Status: Inpatient   Certification Statement: The patient will continue to require additional inpatient hospital stay due to Hospice placement    Discharge Plan:     Code Status: Level 4 - Comfort Care      Subjective:   Patient seen and examined  Overnight events noted  His patient appears to be declining fast   Discussed with the Hematology and also nephrology  In college is assisted hospice and palliative care on board  Patient was made level  comfort care    Objective:     Vitals:   Temp (24hrs), Av 2 °F (37 3 °C), Min:98 1 °F (36 7 °C), Max:100 8 °F (38 2 °C)    Temp:  [98 1 °F (36 7 °C)-100 8 °F (38 2 °C)] 98 5 °F (36 9 °C)  HR:  [] 127  Resp:  [17-21] 18  BP: (109-178)/() 130/61  SpO2:  [92 %-97 %] 92 %  There is no height or weight on file to calculate BMI  Input and Output Summary (last 24 hours): Intake/Output Summary (Last 24 hours) at 2022 1637  Last data filed at 2022 1521  Gross per 24 hour   Intake 3358 34 ml   Output 550 ml   Net 2808 34 ml       Physical Exam:     Physical Exam  Constitutional:       General: She is not in acute distress  HENT:      Head: Normocephalic        Mouth/Throat: Mouth: Mucous membranes are moist    Eyes:      General: No scleral icterus  Cardiovascular:      Rate and Rhythm: Tachycardia present  Pulmonary:      Comments: Tachypnea noted  Decreased breath sounds bilateral  Abdominal:      General: Abdomen is flat  There is no distension  Musculoskeletal:         General: Normal range of motion  Cervical back: Normal range of motion  No rigidity  Neurological:      Mental Status: She is alert  Comments: Patient is alert awake  Appears confused         Additional Data:     Labs:    Results from last 7 days   Lab Units 01/28/22  0508   WBC Thousand/uL 3 63*   HEMOGLOBIN g/dL 8 6*   HEMATOCRIT % 28 5*   PLATELETS Thousands/uL 5*   NEUTROS PCT % 71   LYMPHS PCT % 15   MONOS PCT % 13*   EOS PCT % 0     Results from last 7 days   Lab Units 01/28/22  0508   POTASSIUM mmol/L 3 5   CHLORIDE mmol/L 109*   CO2 mmol/L 18*   BUN mg/dL 82*   CREATININE mg/dL 2 79*   CALCIUM mg/dL 12 1*   ALK PHOS U/L 518*   ALT U/L 187*   AST U/L 297*     Results from last 7 days   Lab Units 01/27/22  0439   INR  1 27*       * I Have Reviewed All Lab Data Listed Above  * Additional Pertinent Lab Tests Reviewed: [address]iral [address] Admission Reviewed    Imaging:    Imaging Reports Reviewed Today Include:   Imaging Personally Reviewed by Myself Includes:      Recent Cultures (last 7 days):     Results from last 7 days   Lab Units 01/28/22  0903   BLOOD CULTURE  Received in Microbiology Lab  Culture in Progress  Received in Microbiology Lab  Culture in Progress         Last 24 Hours Medication List:   Current Facility-Administered Medications   Medication Dose Route Frequency Provider Last Rate    acetaminophen  650 mg Oral Q6H PRN Lesa Bartholomew MD      allopurinol  100 mg Oral Daily BURKE Aguiar      brimonidine tartrate  1 drop Right Eye TID Lesa Bartholomew MD      haloperidol lactate  1 mg Intravenous Q1H PRN Jack Montero MD      HYDROmorphone  0 2 mg Intravenous Q1H PRN Raf Roberson MD      LORazepam  0 5 mg Intravenous Q1H PRN Raf Roebrson MD      ondansetron  8 mg Intravenous [facility] Raf Roberson MD 8 mg (01/28/22 1630)    predniSONE  50 mg Oral Daily BURKE Subramanian          Today, Patient Was Seen By: Hina Potter MD    ** Please Note: Dictation voice to text software may have been used in the creation of this document   **

## 2022-01-28 NOTE — WOUND OSTOMY CARE
Consult Note - Wound   Elidia Keller 80 y o  female MRN: [id number]  Unit/Bed#: [facility] 925-01 Encounter: [id number]      History and Present Illness:  Patient is seen for assessment while working with therapy  Patient is incontinent  Patient is a moderate assist to move  Assessment Findings:   1  Full thickness skin tear on left arm        2  POA stage 1 pressure injury on right buttock         Sacrum has a dried intact scab with blanchable surrounding skin likely from a healing pressure injury    Heels intact          See flowsheets for details    Skin care plans:  1-Hydraguard to bilateral sacrum, buttock and heels BID and PRN  2-Elevate heels to offload pressure  3-Ehob cushion in chair when out of bed  4-Moisturize skin daily with skin nourishing cream   5-Turn/reposition q2h or when medically stable for pressure re-distribution on skin  6-Cleanse skin tear on left arm with saline and gauze  Apply Dermagran, ABD, and wrap with kevin  Change every third day      Call or tigertext with any questions  Wound Care will continue to follow    Wound 01/27/22 Skin Tear Skin tear Arm Lower; Left (Active)   Wound Image   01/28/22 0923   Wound Description Clean;Bleeding;Fragile;Pink 01/28/22 0923   Senait-wound Assessment Clean;Dry; Intact 01/28/22 0923   Wound Length (cm) 3 5 cm 01/28/22 0923   Wound Width (cm) 3 5 cm 01/28/22 0923   Wound Depth (cm) 0 2 cm 01/28/22 0923   Wound Surface Area (cm^2) 12 25 cm^2 01/28/22 0923   Wound Volume (cm^3) 2 45 cm^3 01/28/22 0923   Calculated Wound Volume (cm^3) 2 45 cm^3 01/28/22 0923   Drainage Amount Small 01/28/22 0923   Drainage Description Serosanguineous 01/28/22 0923   Non-staged Wound Description Full thickness 01/28/22 0923   Treatments Cleansed 01/27/22 2100   Dressing Dermagran gauze;ABD 01/28/22 0923   Dressing Status Old drainage 01/28/22 0359       Wound 01/28/22 Pressure Injury Buttocks (Active)   Wound Image   01/28/22 0930   Wound Description Clean;Dry; Intact;Fragile;Pink 01/28/22 0930   Pressure Injury Stage 1 01/28/22 0930   Senait-wound Assessment Clean;Dry; Intact 01/28/22 0930   Wound Length (cm) 2 cm 01/28/22 0930   Wound Width (cm) 5 cm 01/28/22 0930   Wound Surface Area (cm^2) 10 cm^2 01/28/22 0930   Treatments Cleansed 01/28/22 0930   Dressing Moisture barrier 01/28/22 0930

## 2022-01-28 NOTE — NUTRITION
01/28/22 163   Recommendations/Interventions   Summary patient and family seen earlier today during which RD offered and scheduled magic chocolate with meals; family receptive to same; meanwhile nutrition supplement was discontinued by physician and RD no longer has protocol; patient now Code Level 4 comfort measures   Nutrition Recommendations Other (Specify)  (consider adding chcolate magic cup back @ TID)

## 2022-01-28 NOTE — ASSESSMENT & PLAN NOTE
Lab Results   Component Value Date    HGBA1C 6 4 (H) 01/26/2022     Recent Labs     01/27/22  1726 01/27/22  2045 01/28/22  0903 01/28/22  1033   POCGLU 93 88 65 70     Blood Sugar Average: Last 72 hrs:  (P) 126 4   · Decreased oral intake causing hypoglycemia  · Hold glargine at this time    · Will discontinue Accu-Cheks since the patient is transition to level 4 comfort care

## 2022-01-28 NOTE — HOSPICE NOTE
Hospice referral received after hours  Weekend liaison will follow up tomorrow for appropriate level of hospice care

## 2022-01-28 NOTE — PLAN OF CARE
Problem: Potential for Falls  Goal: Patient will remain free of falls  Description: INTERVENTIONS:  - Educate patient/family on patient safety including physical limitations  - Instruct patient to call for assistance with activity   - Consult OT/PT to assist with strengthening/mobility   - Keep Call bell within reach  - Keep bed low and locked with side rails adjusted as appropriate  - Keep care items and personal belongings within reach  - Initiate and maintain comfort rounds  - Make Fall Risk Sign visible to staff  - Offer Toileting every 3 Hours, in advance of need  - Initiate/Maintain 3alarm  - Obtain necessary fall risk management equipment: 3  - Apply yellow socks and bracelet for high fall risk patients  - Consider moving patient to room near nurses station  Outcome: Progressing     Problem: MOBILITY - ADULT  Goal: Maintain or return to baseline ADL function  Description: INTERVENTIONS:  -  Assess patient's ability to carry out ADLs; assess patient's baseline for ADL function and identify physical deficits which impact ability to perform ADLs (bathing, care of mouth/teeth, toileting, grooming, dressing, etc )  - Assess/evaluate cause of self-care deficits   - Assess range of motion  - Assess patient's mobility; develop plan if impaired  - Assess patient's need for assistive devices and provide as appropriate  - Encourage maximum independence but intervene and supervise when necessary  - Involve family in performance of ADLs  - Assess for home care needs following discharge   - Consider OT consult to assist with ADL evaluation and planning for discharge  - Provide patient education as appropriate  Outcome: Progressing  Goal: Maintains/Returns to pre admission functional level  Description: INTERVENTIONS:  - Perform BMAT or MOVE assessment daily    - Set and communicate daily mobility goal to care team and patient/family/caregiver     - Collaborate with rehabilitation services on mobility goals if consulted  - Perform Range of Motion 3 times a day  - Reposition patient every 3 hours    - Dangle patient 3 times a day  - Stand patient 3 times a day  - Ambulate patient 3 times a day  - Out of bed to chair 3 times a day   - Out of bed for meals 3 times a day  - Out of bed for toileting  - Record patient progress and toleration of activity level   Outcome: Progressing

## 2022-01-28 NOTE — DISCHARGE INSTR - OTHER ORDERS
Skin care plans:  1-Hydraguard to bilateral sacrum, buttock and heels BID and PRN  2-Elevate heels to offload pressure  3-Ehob cushion in chair when out of bed  4-Moisturize skin daily with skin nourishing cream   5-Turn/reposition q2h or when medically stable for pressure re-distribution on skin  6-Cleanse skin tear on left arm with saline and gauze  Apply Dermagran, ABD, and wrap with kevin   Change every third day

## 2022-01-28 NOTE — ASSESSMENT & PLAN NOTE
· Calcium on admission: 12 8 (corrects to 13 9)  · PTH elevated @ 11 2  · S/p pamidronate 60 mg IV x1 dose    · Today corrected calcium is 13 7-secondary to malignancy

## 2022-01-28 NOTE — OCCUPATIONAL THERAPY NOTE
Occupational Therapy Evaluation     Patient Name: Brent RAMOS Date: 1/28/2022  Problem List  Principal Problem:    Generalized weakness  Active Problems:    Essential hypertension    History of lung cancer    [facility] )    Hyperlipidemia    Hypercalcemia    Hyperparathyroidism ([facility] )    Acute renal failure superimposed on stage 3b chronic kidney disease (HCC)    Transaminitis    Thrombocytopenia (HCC)    Hypoglycemia associated with type 2 diabetes mellitus (HCC)    Hypokalemia    Past Medical History  Past Medical History:   Diagnosis Date    Anemia     Bladder cancer ([facility] )     Cancer ([facility]     Diabetes mellitus ([facility] )     GERD (gastroesophageal reflux disease)     GERD without esophagitis     Hyperlipidemia     last assessed 4/12/13    Hypertension     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 )     last assessed  7/12/13    Liver cancer ([facility] )     Lung cancer ([facility] )     Squamous Cell Lung CA -Right     Lymphoma ([facility] )     Muscle strain of left lower leg 4/14/2021    Non Hodgkin's lymphoma ([facility] )     last assessed 1/20/14    Osteoporosis     last assessed  4/12/13    Right cataract     last assessed  3/2/15    Shingles      Past Surgical History  Past Surgical History:   Procedure Laterality Date    CATARACT EXTRACTION      EYE SURGERY      LIVER LOBECTOMY      LUNG REMOVAL, PARTIAL Right     lobectomy - onset approx 3/12/12 - right lower lobe        01/28/22 0944   OT Last Visit   OT Visit Date 01/28/22   Note Type   Note type Evaluation   Restrictions/Precautions   Weight Bearing Precautions Per Order No   Other Precautions Chair Alarm; Bed Alarm; Fall Risk   Pain Assessment   Pain Assessment Tool 0-10   Pain Score No Pain   Home Living   Type of Home House   Home Layout Two level;1/2 bath on main level  (3STE)   Bathroom Shower/Tub Walk-in shower   Bathroom Toilet Standard   Bathroom Equipment Grab bars in shower; Shower chair   Home Equipment Walker;Stair glide  (rollator) Additional Comments Pt is a ? historian  Will confirm with CM    Prior Function   Level of [place] Independent with ADLs and functional mobility   Lives With Son  (+ DIL )   Receives Help From Family   ADL Assistance Independent   IADLs Needs assistance   Falls in the last 6 months 1 to 4  (3 per pt report )   Vocational Retired   Comments Pt is a ? historian  Will confirm with CM    Lifestyle   Autonomy PTA, pt reports being I with ADLs, A for ADLs, rollator for fnxl mobility, (-)    Reciprocal Relationships Family    Service to Others Retired -     Intrinsic Gratification Reading    Psychosocial   Psychosocial (WDL) WDL   ADL   Where Assessed Edge of bed   Eating Assistance 5  Supervision/Setup   Grooming Assistance 5  Supervision/Setup   UB Bathing Assistance 3  Moderate Assistance   LB [address] 2  Maximal [address] 3  Moderate Assistance   LB Dressing Assistance 2  Maximal [address]  2  Maximal Assistance   Bed Mobility   Supine to Sit 2  Maximal assistance   Additional items Assist x 2;HOB elevated; Bedrails; Increased time required   Sit to Supine 2  Maximal assistance   Additional items Assist x 2;HOB elevated; Increased time required   Additional Comments R sided lean when seated EOB    Transfers   Sit to Stand 2  Maximal assistance   Additional items Assist x 2; Increased time required   Stand to Sit 2  Maximal assistance   Additional items Assist x 2; Increased time required   Stand pivot 2  Maximal assistance   Additional items Assist x 2; Increased time required   Additional Comments Initially attempted SPT w/ RW - unsuccessful   SPT performed with HHA and 1 LOB with B/L knee buckling    Balance   Static Sitting Fair -   Dynamic Sitting Poor +   Static Standing Poor   Dynamic Standing Poor   Ambulatory Poor -   Activity Tolerance   Activity Tolerance Patient limited by fatigue   Medical Staff Made Aware PT Brian Bethea   Nurse Made Aware RN clearance for session    RUE Assessment   RUE Assessment   (~ 90*, 3/5)   LUE Assessment   LUE Assessment   (~ 90*, 3/5, decreased grasp strength )   Cognition   Arousal/Participation Responsive; Cooperative   Attention Attends with cues to redirect   Orientation Level Oriented to person;Oriented to place;Oriented to situation  (oriented to month)   Following Commands Follows one step commands with increased time or repetition   Comments Pt pleasant and cooperative t/o session   Assessment   Limitation Decreased ADL status; Decreased UE strength;Decreased Safe judgement during ADL;Decreased cognition;Decreased endurance;Decreased self-care trans;Decreased high-level ADLs   Prognosis Fair   Assessment Pt is a 80 y o  female admitted to [facility] on 1/26/2022 w/ Generalized weakness, ambulatory dysfunction, S/p fall   has a past medical history of Anemia, Bladder cancer, Diabetes mellitus, GERD, Hyperlipidemia, Hypertension, Large cell lymphoma, Liver cancer, Lung cancer, Muscle strain of left lower leg (4/14/2021), Non Hodgkin's lymphoma, Osteoporosis, Right cataract, and Shingles  Pt with active OT orders and up with assistance  orders  Pt seen as a co-evaluation with PT due to the patient's co-morbidities, clinically unstable presentation/clinical complexity, and present impairments  Pt is a questionable historian  As per pt report, pta, resides with her son and Dil in a 2STH, 3STE  Pt reports being I w/  ADLS and A for IADLS, (-) drove  Upon evaluation, pt currently requires MAX A x 2 with RW/HHA for transfers  Exhibits decreased strength to B/L UE's limiting performance in ADLs/transfers  Pt currently requires S eating, S grooming, MOD A UB ADLs, MAX A LB ADLs, and MAX A toileting  Pt is limited at this time 2*: endurance, activity tolerance, functional mobility, balance, functional standing tolerance, decreased I w/ ADLS/IADLS, strength and decreased safety awareness  The following Occupational Performance Areas to address include: eating, grooming, bathing/shower, toilet hygiene, dressing, health maintenance, functional mobility, community mobility and clothing management  Pt to benefit from immediate acute skilled OT to address above deficits, improve overall functional independence, maximize fnxl mobility and reduce caregiver burden  From OT standpoint, recommendation at time of d/c would be STR  Pt was left in chair with alarm on after session with all current needs met  The patient's raw score on the AM-PAC Daily Activity inpatient short form is 14, standardized score is 33 39, less than 39 4  Patients at this level are likely to benefit from discharge to post-acute rehabilitation services  Please refer to the recommendation of the Occupational Therapist for safe discharge planning  Goals   Patient Goals None stated    LTG Time Frame 10-14   Plan   Treatment Interventions ADL retraining;Functional transfer training;UE strengthening/ROM; Cognitive reorientation; Endurance training;Patient/family training; Compensatory technique education;Continued evaluation; Energy conservation; Activityengagement;Equipment evaluation/education   Goal Expiration Date 02/11/22   OT Frequency 2-3x/wk   Recommendation   OT Discharge Recommendation Post acute rehabilitation services   OT - OK to Discharge Yes  (to rehab when medically stable )   AM-PAC Daily Activity Inpatient   Lower Body Dressing 2   Bathing 2   Toileting 2   Upper Body Dressing 2   Grooming 3   Eating 3   Daily Activity Raw Score 14   Daily Activity Standardized Score (Calc for Raw Score >=11) 33 39   AM-PAC Applied Cognition Inpatient   Following a Speech/Presentation 3   Understanding Ordinary Conversation 4   Taking Medications 3   Remembering Where Things Are Placed or Put Away 3   Remembering List of 4-5 Errands 3   Taking Care of Complicated Tasks 2   Applied Cognition Raw Score 18   Applied Cognition Standardized Score 38 07         - Pt will complete UB dressing/self care/bathing w/ S using adaptive device and DME as needed    - Pt will complete LB dressing/self care/bathing w/ MIN A using adaptive device and DME as needed    - Pt will complete toileting w/ MIN A w/ G hygiene/thoroughness using DME as needed    - Pt will improve functional transfers to MIN A on/off all surfaces using DME as needed w/ G balance/safety     - Pt will improve functional mobility during ADL/IADL/leisure tasks to MIN A using DME as needed w/ G balance/safety     - Pt will demonstrate G carryover of pt/caregiver education and training as appropriate      - Pt will demonstrate 100% carryover of energy conservation techniques t/o functional I/ADL/leisure tasks w/o cues s/p skilled education    - Pt will engage in ongoing cognitive assessment w/ G participation to assist w/ safe d/c planning/recommendations    - Pt will increase static and dynamic standing/sitting balance to F- using AD/DME as needed to increase safety and I during fnxl transfers and ADLs    - Pt will maintain upright sitting position for at least 20 min during dynamic fnxl activity with F balance and endurance to improve ADL performance and independence, as well as reduce risk of falls       Pascale Torrez MS, OTR/L

## 2022-01-28 NOTE — PROGRESS NOTES
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 )    Diffuse large B cell lymphoma ([facility] )    Glaucoma    Hyperlipidemia    Age-related osteoporosis without current pathological fracture    CKD (chronic kidney disease), stage IV (HCC)    Acute pain of right knee    Hypercalcemia    Hyperparathyroidism (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 )    Hypokalemia     Active issues specifically addressed today include: delirium d/t medical condition; malignant hypercalcemia; LAURA on CKD; lymphoma; end-of-life care; consideration of hospice; lactic acidemia    Plan:  1  Symptom management - pt's hypercalcemia predominates her symptoms  This is unlikely to ever improve, and we may have to aggressively and actively manage challenges related to this complicatoin with haldol for confusion, opioids for pain, and ativan for anxiety    2  Goals - Comfort Measures Only (CMO)    - Pt not competent d/t medical illness   - Chemo is not offered  Dialysis not safe  Blood products not appropriate    - Pt's sons now accept comfort cares, but do not feel she is safe to go home with family d/t her fragile state    - Pt's potential to rapdily and precipitously decline makes her a less than ideal candidate for SNF    - Pt might qualify for IPU; will place referral for hospice cares       Code Status: DNR/DNI - Level 4   Decisional apparatus:  Patient is not competent on my exam today   If competence is lost, patient's substitute decision maker would default to sons by [place]  Advance Directive / Living Will / POLST:  None on file    Lana Lopez MD  Palliative and Supportive Care  Clinic/Answering Service: [phone]  You can find me on [employer]! Interval history: On my visit this AM, there are no family at bedside, and the pt is not appropriately responsive  Nursing reports and charts are reviewed  Bedside rounding performed with RN  Discussed case with out Onc colleagues, and pt is not appropriate for any cancer cares, and the underlying hypercalcemia will continue to propel her decline as the cancer goes untreated  Hospice was rec'd  Later, by phone with pt's son, the hospice recommendation from Onc was noted, and accepted by family  They wish to know where she will physically go from here, as she was unmanageable at home as she declined: pt's 78 y/o sons could not manage her care needs in home as she lost ability to walk and transfer        MEDICATIONS / ALLERGIES:     all current active meds have been reviewed and current meds:   Current Facility-Administered Medications   Medication Dose Route Frequency    acetaminophen (TYLENOL) tablet 650 mg  650 mg Oral Q6H PRN    allopurinol (ZYLOPRIM) tablet 100 mg  100 mg Oral Daily    amLODIPine (NORVASC) tablet 5 mg  5 mg Oral Daily    brimonidine tartrate 0 2 % ophthalmic solution 1 drop  1 drop Right Eye TID    haloperidol lactate (HALDOL) injection 1 mg  1 mg Intravenous Q1H PRN    HYDROmorphone HCl (DILAUDID) injection 0 2 mg  0 2 mg Intravenous Q1H PRN    insulin lispro (HumaLOG) 100 units/mL subcutaneous injection 1-5 Units  1-5 Units Subcutaneous HS    LORazepam (ATIVAN) injection 0 5 mg  0 5 mg Intravenous Q1H PRN    ondansetron (ZOFRAN) 8 mg in sodium chloride 0 9 % 50 mL IVPB  8 mg Intravenous Q8H [address]    pantoprazole (PROTONIX) EC tablet 20 mg  20 mg Oral Early Morning    predniSONE tablet 50 mg  50 mg Oral Daily       Allergies   Allergen Reactions    Eye Drops [Tetrahydrozoline] Eye Swelling     Patient states when she went to the eye doctor and received the eye dilation solution it irritated her eyes       OBJECTIVE:    Physical Exam  Physical Exam    Lab Results:   I have personally reviewed pertinent labs  , CBC:   Lab Results   Component Value Date    WBC 3 63 (L) 01/28/2022    HGB 8 6 (L) 01/28/2022    HCT 28 5 (L) 01/28/2022    MCV 90 01/28/2022    PLT 5 (LL) 01/28/2022    MCH 27 2 01/28/2022    MCHC 30 2 (L) 01/28/2022    RDW 17 0 (H) 01/28/2022    NRBC 0 01/28/2022   , CMP:   Lab Results   Component Value Date    SODIUM 142 01/28/2022    K 3 5 01/28/2022     (H) 01/28/2022    CO2 18 (L) 01/28/2022    BUN 82 (H) 01/28/2022    CREATININE 2 79 (H) 01/28/2022    CALCIUM 12 1 (HH) 01/28/2022     (H) 01/28/2022     (H) 01/28/2022    ALKPHOS 518 (H) 01/28/2022    EGFR 14 01/28/2022   , BMP:  Lab Results   Component Value Date    SODIUM 142 01/28/2022    K 3 5 01/28/2022     (H) 01/28/2022    CO2 18 (L) 01/28/2022    BUN 82 (H) 01/28/2022    CREATININE 2 79 (H) 01/28/2022    GLUC 57 (L) 01/28/2022    CALCIUM 12 1 (HH) 01/28/2022    AGAP 15 (H) 01/28/2022    EGFR 14 01/28/2022   , PT/PTT:No results found for: PT, PTT  Imaging Studies: none new; reviewed historical reports  EKG, Pathology, and Other Studies: none new    Counseling / Coordination of Care    Total floor / unit time spent today 40+ minutes  Greater than 50% of total time was spent with the patient and / or family counseling and / or coordination of care   A description of the counseling / coordination of care: adjustment of parenteral controlled substances for advanced pain and symptoms, and review of the patient's controlled substance dispensing history in the Prescription Drug Monitoring Program in compliance with the [facility] regulations before prescribing said controlled substances; review and assessment of decisional apparatus and capacity, applicable legal codes and extant documents; consideration of hospice; chart review; symptom pursuit; supportive listening; anticipatory guidance

## 2022-01-28 NOTE — CASE MANAGEMENT
Case Management Discharge Planning Note    Patient name Miladis   Location [facility] 925/[facility] 925-01 MRN [id number]  : 1931 Date 2022       Current Admission Date: 2022  Current Admission Diagnosis:Generalized weakness   Patient Active Problem List    Diagnosis Date Noted    Hypokalemia 2022    Generalized weakness 2022    Acute renal failure superimposed on stage 3b chronic kidney disease ([facility] ) 2022    Transaminitis 2022    Thrombocytopenia ([facility] ) 2022    Hypoglycemia associated with type 2 diabetes mellitus ([facility] ) 2022    Left leg weakness 2022    Fall 2022    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 ([facility] ) 12/10/2021    [facility] 2021    Hypercalcemia 2021    Acute pain of right knee 2019    Age-related osteoporosis without current pathological fracture 2019    CKD (chronic kidney disease), stage IV ([facility] ) 2019    History of lung cancer 2018    Chronic GERD 2017    History of adenocarcinoma of lung 2017    Anemia due to chronic kidney disease 2016    Essential hypertension 2016    Diabetes mellitus with microalbuminuria ([facility] ) 2016    Diffuse large B cell lymphoma ([facility] ) 2015    Glaucoma 2014    Hyperlipidemia 2013      LOS (days): 2  Geometric Mean LOS (GMLOS) (days): 3 10  Days to GMLOS:1 3     OBJECTIVE:  Risk of Unplanned Readmission Score: 27         Current admission status: Inpatient   [address]   [address] [address]  Phone: [phone] Fax: [address] [address]  Phone: [phone] Fax: [phone]    Primary Care Provider: Leandro Silva DO    Primary Insurance: MEDICARE  Secondary Insurance: AARP    DISCHARGE DETAILS:    Discharge planning discussed with[de-identified] Pt's sons and dtr in laws  Freedom of Choice: Yes  Comments - Freedom of Choice: Pt's family requested a referral to be placed to [facility] vs Hospice at [facility]  CM contacted family/caregiver?: Yes  Were Treatment Team discharge recommendations reviewed with patient/caregiver?: Yes  Did patient/caregiver verbalize understanding of patient care needs?: Yes  Were patient/caregiver advised of the risks associated with not following Treatment Team discharge recommendations?: Yes    Contacts  Patient Contacts: Nita Coe and Chucho Vaughn  Relationship to Patient[de-identified] Family  Contact Method: Painting With [employer] Twist [facility] Person  Phone Number: [phone]  Reason/Outcome: Continuity of [address]         Is the patient interested in [facility] at discharge?: No    DME Referral Provided  Referral made for DME?: No    Other Referral/Resources/Interventions Provided:  Interventions: Hospice  Referral Comments: referral to [facility] sent as requested    Treatment Team Recommendation: Hospice  Discharge Destination Plan[de-identified] Hospice  Transport at Discharge : BLS Ambulance     Transfer Mode: Stretcher  Accompanied by: EMS personnel     Additional Comments: CM met with pt's son Chucho Vaughn and his wife as well as son Nita Coe and his wife via phone  CM explained the differernt levels of hospice care and steps needed to pursue each level  Per pt's family they are requesting that CM send a referral to [facility] and to SNF facilities  CM provided list of nursing facilities as well and SNF application  CM will follow

## 2022-01-28 NOTE — PLAN OF CARE
Problem: OCCUPATIONAL THERAPY ADULT  Goal: Performs self-care activities at highest level of function for planned discharge setting  See evaluation for individualized goals  Description: Treatment Interventions: ADL retraining,Functional transfer training,UE strengthening/ROM,Cognitive reorientation,Endurance training,Patient/family training,Compensatory technique education,Continued evaluation,Energy conservation,Activityengagement,Equipment evaluation/education          See flowsheet documentation for full assessment, interventions and recommendations  Note: Limitation: Decreased ADL status,Decreased UE strength,Decreased Safe judgement during ADL,Decreased cognition,Decreased endurance,Decreased self-care trans,Decreased high-level ADLs  Prognosis: Fair  Assessment: Pt is a 80 y o  female admitted to [facility] on 1/26/2022 w/ Generalized weakness, ambulatory dysfunction, S/p fall   has a past medical history of Anemia, Bladder cancer, Diabetes mellitus, GERD, Hyperlipidemia, Hypertension, Large cell lymphoma, Liver cancer, Lung cancer, Muscle strain of left lower leg (4/14/2021), Non Hodgkin's lymphoma, Osteoporosis, Right cataract, and Shingles  Pt with active OT orders and up with assistance  orders  Pt seen as a co-evaluation with PT due to the patient's co-morbidities, clinically unstable presentation/clinical complexity, and present impairments  Pt is a questionable historian  As per pt report, pta, resides with her son and Dil in a 2STH, 3STE  Pt reports being I w/  ADLS and A for IADLS, (-) drove  Upon evaluation, pt currently requires MAX A x 2 with RW/HHA for transfers  Exhibits decreased strength to B/L UE's limiting performance in ADLs/transfers  Pt currently requires S eating, S grooming, MOD A UB ADLs, MAX A LB ADLs, and MAX A toileting   Pt is limited at this time 2*: endurance, activity tolerance, functional mobility, balance, functional standing tolerance, decreased I w/ ADLS/IADLS, strength and decreased safety awareness  The following Occupational Performance Areas to address include: eating, grooming, bathing/shower, toilet hygiene, dressing, health maintenance, functional mobility, community mobility and clothing management  Pt to benefit from immediate acute skilled OT to address above deficits, improve overall functional independence, maximize fnxl mobility and reduce caregiver burden  From OT standpoint, recommendation at time of d/c would be STR  Pt was left in chair with alarm on after session with all current needs met  The patient's raw score on the -PAC Daily Activity inpatient short form is 14, standardized score is 33 39, less than 39 4  Patients at this level are likely to benefit from discharge to post-acute rehabilitation services  Please refer to the recommendation of the Occupational Therapist for safe discharge planning       OT Discharge Recommendation: Post acute rehabilitation services  OT - OK to Discharge: Yes (to rehab when medically stable )

## 2022-01-29 NOTE — PROGRESS NOTES
[facility]  Progress Note - Christal Chauhan [birth year], 80 y o  female MRN: [id number]  Unit/Bed#: [facility] 925-01 Encounter: [id number]  Primary Care Provider: Jackie Wagner DO   Date and time admitted to hospital: 1/26/2022  5:42 PM    * Generalized weakness  Assessment & Plan  · Multifactorial secondary to thrombocytopenia, dehydration causing acute on chronic renal failure, hypercalcemia, hyperglycemia  · Continue D5 containing fluids  Accu-Cheks insulin sliding scale  Hold long-acting insulin  Hemoglobin A1c 6 8  · Nephrology consulted for LAURA (see plan under LAURA) and hypercalcemia  · Likely secondary to extensive lymphoma    Lactic acidosis  Assessment & Plan  · Patient noted to have metabolic acidosis  Discussed with nephrology started the patient on bicarb drip  Bicarb discontinued since the patient is noted to be transition to comfort care    Hypoglycemia associated with type 2 diabetes mellitus [facility])  Assessment & Plan  Lab Results   Component Value Date    HGBA1C 6 4 (H) 01/26/2022     Recent Labs     01/27/22  1726 01/27/22  2045 01/28/22  0903 01/28/22  1033   POCGLU 93 88 65 70     Blood Sugar Average: Last 72 hrs:  (P) 126 4   · Decreased oral intake causing hypoglycemia  · Hold glargine at this time  · Will discontinue Accu-Cheks since the patient is transition to level 4 comfort care    Thrombocytopenia ([address] )  Assessment & Plan  · Suspect secondary to lymphoma recurrence  · Discontinue aspirin  · Oncology consulted and appreciate their recommendations    Platelet was 5 today and received 1 unit of platelet transfusion a early today  · No further blood work over transfusion    Transaminitis  Assessment & Plan  Continue to monitor a hepatic panel  Secondary to lymphoma    Acute renal failure superimposed on stage 3b chronic kidney disease [facility])  Assessment & Plan  Lab Results   Component Value Date    EGFR 14 01/28/2022    EGFR 13 01/27/2022    EGFR 13 01/26/2022    CREATININE 2 79 (H) 01/28/2022    CREATININE 2 88 (H) 01/27/2022    CREATININE 2 94 (H) 01/26/2022     · Most likely prerenal in setting of decreased oral intake, diuretic use and hypercalcemia  · Creatinine on admission: 3 33  · Baseline creatinine:  1 7-2 2      Hyperparathyroidism (HCC)  Assessment & Plan  · TSH normal @ 0 500  · PTH low @ 11 2  · PTH suppressed due to hypercalcemia    Hypercalcemia  Assessment & Plan  · Calcium on admission: 12 8 (corrects to 13 9)  · PTH elevated @ 11 2  · S/p pamidronate 60 mg IV x1 dose  ·     Elevated procalcitonin  Assessment & Plan  · Patient noted to have low-grade fever and elevated procalcitonin both of which is secondary to lymphoma  · No further blood draws    Hyperlipidemia  Assessment & Plan  · Hold statin due to elevated LFTs      Diffuse large B cell lymphoma ([facility] [address] )  Assessment & Plan  · Patient with a history of diffuse large B-cell lymphoma, originally diagnosed in 2007  S/p 8 cycles of R-CHOP at that time  · CT C/A/P:  Status post right lower lobectomy with minimal dependent atelectasis at the right lung base and trace right pleural effusion  Interval development of splenomegaly and extensive retroperitoneal/left inguinal adenopathy suspicious for lymphoma recurrence  Left hydronephrosis due to extensive retroperitoneal adenopathy  Thickening of the bladder wall adjacent to the conglomerate adenopathy  · Oncology consulted and appreciate their input  · Started prednisone 50 mg daily x5 days  Patient is not a candidate for further treatment of lymphoma given her advanced age    · Palliative care consulted for GOC/hospice discussion-family made the decision to transition the patient to level 4 comfort care  · Continue with the allopurinol and prednisone as tolerated    History of lung cancer  Assessment & Plan  · According to outpatient thoracic surgeon notes from June 2021 "Adenocarcinoma of lung, right ([address] )  · No indication of recurrence  Essential hypertension  Assessment & Plan  · Discontinue Norvasc  · Hold hydrochlorothiazide secondary to LAURA on CKD        VTE Pharmacologic Prophylaxis:   Pharmacologic:  No pharmacological DVT prophylaxis secondary to comfort care  Mechanical VTE Prophylaxis in Place: Yes    Patient Centered Rounds: I have performed bedside rounds with nursing staff today  Discussions with Specialists or Other Care Team Provider:     Education and Discussions with Family / Patient:     Time Spent for Care: 30 minutes  More than 50% of total time spent on counseling and coordination of care as described above  Current Length of Stay: 3 day(s)    Current Patient Status: Inpatient   Certification Statement: The patient will continue to require additional inpatient hospital stay due to Hospice placement    Discharge Plan:     Code Status: Level 4 - Comfort Care      Subjective:   Patient seen and examined  Appears comfortable  Nursing reported that patient is not able to take any p o  meds    Objective:     Vitals:   Temp (24hrs), Av 6 °F (37 °C), Min:98 °F (36 7 °C), Max:99 1 °F (37 3 °C)    Temp:  [98 °F (36 7 °C)-99 1 °F (37 3 °C)] 99 1 °F (37 3 °C)  HR:  [] 118  Resp:  [14-18] 18  BP: (104-124)/(47-56) 104/47  SpO2:  [92 %-95 %] 92 %  There is no height or weight on file to calculate BMI  Input and Output Summary (last 24 hours): Intake/Output Summary (Last 24 hours) at 2022 1756  Last data filed at 2022 1300  Gross per 24 hour   Intake 120 ml   Output 450 ml   Net -330 ml       Physical Exam:     Physical Exam  Constitutional:       General: She is not in acute distress  Comments: Opens eyes to voice   HENT:      Head: Normocephalic  Nose: Nose normal    Eyes:      General: No scleral icterus  Cardiovascular:      Rate and Rhythm: Regular rhythm  Pulmonary:      Comments: Decreased breath sounds bilateral  Abdominal:      General: Abdomen is flat  Musculoskeletal:         General: Normal range of motion  Neurological:      Comments: Opened eyes to voice  Do not follow commands         Additional Data:     Labs:    Results from last 7 days   Lab Units 01/28/22  0508   WBC Thousand/uL 3 63*   HEMOGLOBIN g/dL 8 6*   HEMATOCRIT % 28 5*   PLATELETS Thousands/uL 5*   NEUTROS PCT % 71   LYMPHS PCT % 15   MONOS PCT % 13*   EOS PCT % 0     Results from last 7 days   Lab Units 01/28/22  0508   POTASSIUM mmol/L 3 5   CHLORIDE mmol/L 109*   CO2 mmol/L 18*   BUN mg/dL 82*   CREATININE mg/dL 2 79*   CALCIUM mg/dL 12 1*   ALK PHOS U/L 518*   ALT U/L 187*   AST U/L 297*     Results from last 7 days   Lab Units 01/27/22  0439   INR  1 27*       * I Have Reviewed All Lab Data Listed Above  * Additional Pertinent Lab Tests Reviewed: No New Labs Available For Today    Imaging:    Imaging Reports Reviewed Today Include:   Imaging Personally Reviewed by Myself Includes:  Recent Cultures (last 7 days):     Results from last 7 days   Lab Units 01/28/22  0903   BLOOD CULTURE  No Growth at 24 hrs  No Growth at 24 hrs  Last 24 Hours Medication List:   Current Facility-Administered Medications   Medication Dose Route Frequency Provider Last Rate    acetaminophen  650 mg Oral Q6H PRN Bernard Schmidt MD      haloperidol lactate  1 mg Intravenous Q1H PRN Cruz Valiente MD      HYDROmorphone  0 2 mg Intravenous Q1H PRN Cruz Valiente MD      LORazepam  0 5 mg Intravenous Q1H PRN Cruz Valiente MD      ondansetron  8 mg Intravenous Q8H PRN Austin Alvarez MD          Today, Patient Was Seen By: Austin Alvarez MD    ** Please Note: Dictation voice to text software may have been used in the creation of this document   **

## 2022-01-29 NOTE — HOSPICE NOTE
Reviewed with our Medical Director and patient was approved for Hospice at a facility  I called to review services with family and they were agreeable  I updated [facility] [place] and she stated they have already started to place referrals to facilities  Will continue to follow patient

## 2022-01-29 NOTE — ED ATTENDING ATTESTATION
1/26/2022  IDevi MD, saw and evaluated the patient  I have discussed the patient with the resident/non-physician practitioner and agree with the resident's/non-physician practitioner's findings, Plan of Care, and MDM as documented in the resident's/non-physician practitioner's note, except where noted  All available labs and Radiology studies were reviewed  I was present for key portions of any procedure(s) performed by the resident/non-physician practitioner and I was immediately available to provide assistance  At this point I agree with the current assessment done in the Emergency Department  I have conducted an independent evaluation of this patient a history and physical is as follows:    ED Course     Patient is 29-year-old female with history of B-cell lymphoma who presents with generalized weakness fatigue  Patient had outpatient labs done by primary care provider shows low platelet count hypercalcemia    On arrival patient is slow to respond will respond appropriately moderate fatigue cognitive slowing noted  But will follow commands appropriately no  Patient is accompanied by her son he says that she has been bruising very easily at home  Lower extremities 2/5 bilaterally 5/5 upper patient denies any chest abdomen back pain  Does admit to some minor falls recently  Denies head strike  Impression:  Weakness fatigue history of recent falls history of recent diagnosis of thrombocytopenia  Plan broad workup including metabolic causes, CMP CBC ECG troponin CK plan to scan head C-spine chest and pelvis for occult causes of trauma  Patient will be admitted to hospital for further evaluation management  While in emergency department patient became per family diaphoretic Accu-Chek performed sugar noted to be low teens  Patient given amp of D50 and started dextrose containing fluids to maintain her blood sugar above 70 mg/dL     Platelets noted to be 19  No active bleeding noted       Patient to be admitted to medicine       Critical Care Time  Procedures

## 2022-01-29 NOTE — PROGRESS NOTES
Progress note - Palliative and Supportive Care   Sandra Salas 80 y o  female [id number]    Patient Active Problem List   Diagnosis    Essential hypertension    History of lung cancer    History of adenocarcinoma of lung    Anemia due to chronic kidney disease    Chronic GERD    [facility] with [facility]albumin[facility]    Diffuse large B cell lymphoma ([facility] )    Glaucoma    Hyperlipidemia    Age-related osteoporosis without current pathological fracture    Elevated procalcitonin    CKD (chronic kidney disease), stage IV (HCC)    Acute pain of right knee    Hypercalcemia    Hyperparathyroidism ([facility] )    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 (HCC)    Left leg weakness    Fall    Generalized weakness    Acute renal failure superimposed on stage 3b chronic kidney disease (HCC)    Transaminitis    Thrombocytopenia (HCC)    Hypoglycemia associated with type 2 diabetes mellitus (HCC)    Hypokalemia    Lactic acidosis     Active issues specifically addressed today include: delirium d/t medical condition; malignant hypercalcemia; LAURA on CKD; lymphoma; end-of-life care; consideration of hospice; lactic acidemia    Plan:  1  Symptom management - continue current regimen, no changes    2  Goals - Comfort Measures Only (CMO)    - Hospice referral placed     Code Status: DNR/DNI - Level 4   Decisional apparatus:  Patient is not competent on my exam today  If competence is lost, patient's substitute decision maker would default to sons by [place]  Advance Directive / Living Will / POLST:  None on file      Interval history: On my visit this AM, there are no family at bedside, and the pt is not appropriately responsive  Patient appears comfortable at this time       MEDICATIONS / ALLERGIES:     all current active meds have been reviewed and current meds:   Current Facility-Administered Medications   Medication Dose Route Frequency    acetaminophen (TYLENOL) tablet 650 mg  650 mg Oral Q6H PRN    allopurinol (ZYLOPRIM) tablet 100 mg  100 mg Oral Daily    brimonidine tartrate 0 2 % ophthalmic solution 1 drop  1 drop Right Eye TID    haloperidol lactate (HALDOL) injection 1 mg  1 mg Intravenous Q1H PRN    HYDROmorphone HCl (DILAUDID) injection 0 2 mg  0 2 mg Intravenous Q1H PRN    LORazepam (ATIVAN) injection 0 5 mg  0 5 mg Intravenous Q1H PRN    ondansetron (ZOFRAN) 8 mg in sodium chloride 0 9 % 50 mL IVPB  8 mg Intravenous Q8H [address]    predniSONE tablet 50 mg  50 mg Oral Daily       Allergies   Allergen Reactions    Eye Drops [Tetrahydrozoline] Eye Swelling     Patient states when she went to the eye doctor and received the eye dilation solution it irritated her eyes       OBJECTIVE:    Physical Exam  Physical Exam  Vitals and nursing note reviewed  Constitutional:       General: She is not in acute distress  HENT:      Head: Normocephalic and atraumatic  Right Ear: External ear normal       Left Ear: External ear normal    Eyes:      General:         Right eye: No discharge  Left eye: No discharge  Cardiovascular:      Rate and Rhythm: Normal rate  Pulmonary:      Effort: Pulmonary effort is normal  No respiratory distress  Abdominal:      General: There is no distension  Palpations: Abdomen is soft  Musculoskeletal:      Right lower leg: No edema  Left lower leg: No edema  Skin:     Coloration: Skin is pale  Findings: No rash  Lab Results:   I have personally reviewed pertinent labs  , CBC:   No results found for: WBC, HGB, HCT, MCV, PLT, ADJUSTEDWBC, MCH, MCHC, RDW, MPV, NRBC, CMP:   No results found for: SODIUM, K, CL, CO2, ANIONGAP, BUN, CREATININE, GLUCOSE, CALCIUM, AST, ALT, ALKPHOS, PROT, BILITOT, EGFR, BMP:  No results found for: SODIUM, K, CL, CO2, BUN, CREATININE, GLUC, GLUF, CALCIUM, AGAP, EGFR, PT/PTT:No results found for: PT, PTT  Imaging Studies: none new; reviewed historical reports  EKG, Pathology, and Other Studies: none new    Counseling / Coordination of Care    Total floor / unit time spent today 15+ minutes  Greater than 50% of total time was spent with the patient and / or family counseling and / or coordination of care   A description of the counseling / coordination of care: comfort care, end of life care

## 2022-01-29 NOTE — ASSESSMENT & PLAN NOTE
· Calcium on admission: 12 8 (corrects to 13 9)  · PTH elevated @ 11 2  · S/p pamidronate 60 mg IV x1 dose    ·

## 2022-01-29 NOTE — ASSESSMENT & PLAN NOTE
Lab Results   Component Value Date    EGFR 14 01/28/2022    EGFR 13 01/27/2022    EGFR 13 01/26/2022    CREATININE 2 79 (H) 01/28/2022    CREATININE 2 88 (H) 01/27/2022    CREATININE 2 94 (H) 01/26/2022     · Most likely prerenal in setting of decreased oral intake, diuretic use and hypercalcemia  · Creatinine on admission: 3 33  · Baseline creatinine:  1 7-2 2

## 2022-01-29 NOTE — CASE MANAGEMENT
Case Management Discharge Planning Note    Patient name Demetris Foster  Location [facility]-01 MRN [id number]  : 1931 Date 2022       Current Admission Date: 2022  Current Admission Diagnosis:Generalized weakness   Patient Active Problem List    Diagnosis Date Noted    Lactic acidosis 2022    Hypokalemia 2022    Generalized weakness 2022    Acute renal failure superimposed on stage 3b chronic kidney disease ([facility] ) 2022    Transaminitis 2022    Thrombocytopenia ([facility] ) 2022    Hypoglycemia associated with type 2 diabetes mellitus ([address] ) 2022    Left leg weakness 2022    Fall 2022    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 ([facility] ) 12/10/2021    Hyperparathyroidism ([address] ) 2021    Hypercalcemia 2021    Acute pain of right knee 2019    Age-related osteoporosis without current pathological fracture 2019    Elevated procalcitonin 2019    CKD (chronic kidney disease), stage IV ([facility] ) 2019    History of lung cancer 2018    Chronic GERD 2017    History of adenocarcinoma of lung 2017    Anemia due to chronic kidney disease 2016    Essential hypertension 2016    Diabetes mellitus with microalbuminuria ([address] ) 2016    Diffuse large B cell lymphoma ([address] ) 2015    Glaucoma 2014    Hyperlipidemia 2013      LOS (days): 3  Geometric Mean LOS (GMLOS) (days): 3 10  Days to GMLOS:0 6     OBJECTIVE:  Risk of Unplanned Readmission Score: 27         Current admission status: Inpatient   Preferred Pharmacy:   [facility] [address] [place] [id number]  Phone: [phone] Fax: [address] [address]  Phone: [phone] Fax: [phone]    Primary Care Provider: Jessica Farmer Vanessa Hoffmann DO    Primary Insurance: MEDICARE  Secondary Insurance: AARP    DISCHARGE DETAILS:    Discharge planning discussed with[de-identified] Pt's son Chelsea Bains and DIL via phone  Freedom of Choice: Yes     Other Referral/Resources/Interventions Provided:  Interventions: Hospice  Referral Comments: Per hospice team, pt is unfortunately not appropriate for IPU at this time  CM continues to follow for accepting SNF placement for hospice services -- Per admissions representative Viviana, [facility] [place] is able to accept today, however [facility] would need to review for wound care needs  Unfortunately, there are no further accepting facility at this time  CM s/w mesfin Bains via phone (DIL heard in background) to review same -- Chelsea Bains confirmed understanding, and stated he would review with his family and call this CM back to discuss  CM received return call from Chelsea Bains stating he would like CM to follow-up on facilities closer ot the [facility] area  DIL states she read the reviews of [facility], and would also like CM to [place] back with alternate facilities  CM informed them of barriers to placement, including OOP cost of R&B -- Family aware of costs frequenting 400 dollars/day  Per family's request, CM emailed mesfin Bains the financial application for [facility] -- Application sent to [email or web address]  CM will continue to [place] back with reviewing facilities  Chelsea Bains states he will complete HFM application and return back to CM  Admissions representative @ [facility] reviewing for possible opening at [facility], 1/30      Treatment Team Recommendation: Hospice,SNF  Discharge Destination Plan[de-identified] Hospice,SNF

## 2022-01-30 NOTE — PLAN OF CARE
Problem: MOBILITY - ADULT  Goal: Maintain or return to baseline ADL function  Description: INTERVENTIONS:  -  Assess patient's ability to carry out ADLs; assess patient's baseline for ADL function and identify physical deficits which impact ability to perform ADLs (bathing, care of mouth/teeth, toileting, grooming, dressing, etc )  - Assess/evaluate cause of self-care deficits   - Assess range of motion  - Assess patient's mobility; develop plan if impaired  - Assess patient's need for assistive devices and provide as appropriate  - Encourage maximum independence but intervene and supervise when necessary  - Involve family in performance of ADLs  - Assess for home care needs following discharge   - Consider OT consult to assist with ADL evaluation and planning for discharge  - Provide patient education as appropriate  Outcome: Progressing  Goal: Maintains/Returns to pre admission functional level  Description: INTERVENTIONS:  - Perform BMAT or MOVE assessment daily    - Set and communicate daily mobility goal to care team and patient/family/caregiver  - Collaborate with rehabilitation services on mobility goals if consulted  - Perform Range of Motion  times a day  - Reposition patient every  hours    - Dangle patient times a day  - Stand patient  times a day  - Ambulate patient times a day  - Out of bed to chair  times a day   - Out of bed for meals  times a day  - Out of bed for toileting  - Record patient progress and toleration of activity level   Outcome: Progressing

## 2022-01-30 NOTE — DEATH NOTE
INPATIENT DEATH NOTE  Flor Tenorio 80 y o  female MRN: [id number]  Unit/Bed#: [facility] 925-01 Encounter: [id number]           [facility] Pronouncement of Death  Patient: Flor Tenorio 80 y o  female   MRN: [id number]  PCP: Arabella Gillis DO  [facility] Encounter: [id number]    Date of Admission:  1/26/2022  Date of Death: 01/30/22    Time of Death:  1 a m  Code Status at Time of Death: Level 4 - Comfort Care    Patient on Hospice?:  Yes    Family Notification?:  Yes  Son - Brandy Sánchez     Autopsy Desired?:  No     Suspected Cause of Death:  Diffuse large B-cell lymphoma    Hospital Problem List:     Principal Problem:    Generalized weakness  Active Problems:    Essential hypertension    History of lung cancer    Diffuse large B cell lymphoma (HCC)    Hyperlipidemia    Elevated procalcitonin    Hypercalcemia    Hyperparathyroidism (HCC)    Acute renal failure superimposed on stage 3b chronic kidney disease (HCC)    Transaminitis    Thrombocytopenia (HCC)    Hypoglycemia associated with type 2 diabetes mellitus (HCC)    Hypokalemia    Lactic acidosis      Pronouncement of Death: I was contacted by nursing staff to evaluate patient found without vital signs  Patient is identified visually and identification confirmed with identification bracelet  The patient is laying in bed with all lines and tubes intact  The patient is examined personally and no heart sounds heard nor pulse detected  No breath sounds after 2 minutes auscultation  Pupils are dilated and fixed   No corneal reflex present  The patient is pronounced dead on this date and time        Giorgio Vicente MD

## 2022-01-31 LAB — PTH RELATED PROT SERPL-SCNC: <2 PMOL/L

## 2022-01-31 NOTE — DISCHARGE SUMMARY
Discharge Summary - [facility] Internal Medicine    Patient Information: Mery Ni 80 y o  female MRN: [id number]  Unit/Bed#: [facility] 925-01 Encounter: [id number]    Discharging Physician / Practitioner: Hina Potter MD  PCP: Janie Dunaway DO  Admission Date: 2022  Discharge Date: 2022  Disposition:     Other: patient      Reason for Admission:  Generalized weakness    Discharge Diagnoses:     Principal Problem:    Generalized weakness  Active Problems:    Essential hypertension    History of lung cancer    Diffuse large B cell lymphoma ([facility] [address] )    Hyperlipidemia    Elevated procalcitonin    Hypercalcemia    Hyperparathyroidism ([facility] [address] )    Acute renal failure superimposed on stage 3b chronic kidney disease (HCC)    Transaminitis    Thrombocytopenia (HCC)    Hypoglycemia associated with type 2 diabetes mellitus (HCC)    Hypokalemia    Lactic acidosis  Resolved Problems:    * No resolved hospital problems  *      Consultations During Hospital Stay:  Oncology  Palliative care  Nephrology  Gastroenterology    Procedures Performed:         Significant Findings / Test Results:     CT chest abdomen and pelvis-interval development of splenomegaly and extensive a left adenopathy  Finding suspicious for of recurrence  Left hydronephrosis due to extensive retroperitoneal adenopathy  Thickening of the left bladder wall adjacent to the  Cong[place] adenopathy  Direct invasion of the bladder cannot be excluded  CT spine cervical without contrast-no cervical spine fracture or traumatic malalignment  CT head-no acute intracranial abnormality    Incidental Findings:           Hospital Course:     Mery Ni is a 80 y o  female patient who originally presented to the hospital on 2022 due to generalized weakness  Patient was found to be in acute kidney injury at the time of presentation  Patient's creatinine was 3 3 at the time of presentation  Patient with decreased p o   Intake and started on IV fluids  Patient also noted to have diffuse lymphadenopathy along with splenomegaly  Concern for hematoma recurrence  Patient with known history of diffuse B-cell lymphoma status post treatment in the past   Patient was evaluated by the Hematology and at that time it was felt that she is a candidate for any further treatment  Patient and family opted for comfort care    Patient was made comfort care and patient past while in the hospital   For details refer to the chart    Condition at Discharge: Patient      Discharge Day Visit / Exam:     Subjective:    Vitals: Blood Pressure: (!) 108/48 (22 0000)  Pulse: (!) 129 (22 0631)  Temperature: 97 6 °F (36 4 °C) (22 2348)  Temp Source: Rectal (22 2141)  Respirations: 20 (22 234)  SpO2: (!) 88 % (22 0631)  Exam:     Discussion with Family:  Son updated over the phone

## 2022-02-02 LAB
BACTERIA BLD CULT: NORMAL
BACTERIA BLD CULT: NORMAL

## 2022-09-26 NOTE — PROGRESS NOTES
Thoracic Follow-Up  Assessment/Plan:    Adenocarcinoma of lung, right [facility])  Ms Vijay Gentile is 9 years out from resection with no evidence of a new lung cancer  We had a discussion regarding further follow up, since she was wondering if she needs to continue surveillance  According to the patient, at her age, she would not have any findings further investigated or treated  Therefore, no further surveillance is required  We discussed this at length  We will not schedule a follow up appointment at this time  She is in complete agreement with the plan  Diagnoses and all orders for this visit:    Adenocarcinoma of lung, right ([address] )             Thoracic History       Diagnosis: Stage IA non-small cell lung carcinoma  Procedures: 1  Flexible bronchoscopy, right thoracoscopic lower lobe wedge resection, completion lobectomy, and mediastinal lymph node dissection performed on March 1, 2012   2  Flexible bronchoscopy with endobronchial washings performed on March 4, 2012  3  Flexible bronchoscopy, endobronchial washings performed on March 7, 2012  4  Flexible bronchoscopy, endobronchial washings, and right chest tube insertion performed on March 9, 2012  Pathology: Right lower lobe is consistent with moderately differentiated squamous cell carcinoma measuring 1 2 cm in greatest dimension  There was no evidence of visceral pleural invasion or lymphovascular invasion  All lymph nodes including levels right paratracheal, 7, 9, 10, and 12 were negative for metastatic carcinoma  Seventh Edition AJCC tumor stage IA (T1a, N0,M0)  Adjuvant Therapy: None indicated per NCCN guidelines       Patient ID: Derrick Duran is a 80 y o  female  HPI      Ms Vijay Gentile is an 79 yo female with a history of a stage IA squamous cell carcinoma who underwent a right thoracoscopic lower lobectomy on 3/1/12   She last had a telemedicine visit on 5/29/20, at which point her CT scan did not reveal any new pulmonary nodules or lymphadenopathy  She returns today, with a CT scan from 5/24/21  This did not reveal any new nodules or enlarged lymph nodes  On discussion, she feels pretty well  She denies fever, chills, cough, new bone pains, shortness of breath, weight loss, or chest pain  The following portions of the patient's history were reviewed and updated as appropriate: allergies, current medications, past family history, past medical history, past social history, past surgical history and problem list     Review of Systems      Objective:   Physical Exam  Vitals signs reviewed  Constitutional:       General: She is not in acute distress  Appearance: Normal appearance  HENT:      Head: Normocephalic and atraumatic  Mouth/Throat:      Comments: Masked   Eyes:      General: No scleral icterus  Extraocular Movements: Extraocular movements intact  Neck:      Musculoskeletal: Normal range of motion and neck supple  Cardiovascular:      Rate and Rhythm: Normal rate and regular rhythm  Heart sounds: Normal heart sounds  Pulmonary:      Effort: Pulmonary effort is normal  No respiratory distress  Breath sounds: Normal breath sounds  Musculoskeletal:      Comments: Uses a walker   Lymphadenopathy:      Cervical: No cervical adenopathy  Skin:     General: Skin is warm and dry  Neurological:      Mental Status: She is alert and oriented to person, place, and time  Motor: No weakness  Psychiatric:         Mood and Affect: Mood normal          Behavior: Behavior normal          Thought Content: Thought content normal      /78   Pulse 90   Temp 98 4 °F (36 9 °C) (Temporal)   Resp 16   Ht 5' 1" (1 549 m)   Wt 74 5 kg (164 lb 3 9 oz)   SpO2 98%   BMI 31 03 kg/m²     Ct Chest Wo Contrast    Result Date: 5/30/2021  Narrative CT CHEST WITHOUT IV CONTRAST INDICATION:   [id number] [id number]: Personal history of other malignant neoplasm of bronchus and lung    History of stage IA squamous cell carcinoma of the lung, status post right lower lobectomy  Surveillance  COMPARISON:  CT, dated 5/27/2020  TECHNIQUE: CT examination of the chest was performed without intravenous contrast   Axial, sagittal, and coronal 2D reformatted images were created from the source data and submitted for interpretation  Radiation dose length product (DLP) for this visit:  285 mGy-cm   This examination, like all CT scans performed in the [facility], was performed utilizing techniques to minimize radiation dose exposure, including the use of iterative reconstruction and automated exposure control  FINDINGS: LUNGS:  Redemonstrated are postsurgical findings related to right lower lobectomy  The surgical staple line is normal in appearance  There are no concerning pulmonary nodules  There is a trace amount of scarring and atelectasis  The trachea and endobronchial tree is normal  PLEURA:  Unremarkable  HEART/GREAT VESSELS:  Atherosclerotic changes are noted in thoracic aorta and coronary arteries  MEDIASTINUM AND CASSIE:  Unremarkable  CHEST WALL AND LOWER NECK:   Unremarkable  VISUALIZED STRUCTURES IN THE UPPER ABDOMEN:  Unremarkable  OSSEOUS STRUCTURES:  Redemonstrated chronic compression deformity of the T11 vertebral body  Impression Stable postsurgical changes related to right lower lobectomy  There are no new concerning pulmonary nodules   Workstation performed: [id number] no

## 2024-12-09 NOTE — ASSESSMENT & PLAN NOTE
Ms Alicja Andrade is stable from a thoracic surgery and lung cancer standpoint  Her most recent CT scan does not reveal any evidence of metastatic or recurrent disease  Therefore, we will continue routine surveillance and she will return in one year with a new CT scan 
Warm

## (undated) RX ORDER — BIMATOPROST 0.1 MG/ML
1 SOLUTION/ DROPS OPHTHALMIC EVERY EVENING
Start: 2021-09-27